# Patient Record
Sex: FEMALE | Race: WHITE | Employment: FULL TIME | URBAN - METROPOLITAN AREA
[De-identification: names, ages, dates, MRNs, and addresses within clinical notes are randomized per-mention and may not be internally consistent; named-entity substitution may affect disease eponyms.]

---

## 2017-01-05 ENCOUNTER — OFFICE VISIT (OUTPATIENT)
Dept: CARDIOLOGY CLINIC | Age: 77
End: 2017-01-05

## 2017-01-05 VITALS
HEART RATE: 66 BPM | DIASTOLIC BLOOD PRESSURE: 60 MMHG | BODY MASS INDEX: 32.57 KG/M2 | HEIGHT: 62 IN | WEIGHT: 177 LBS | SYSTOLIC BLOOD PRESSURE: 124 MMHG | OXYGEN SATURATION: 95 %

## 2017-01-05 DIAGNOSIS — R00.2 PALPITATIONS: Primary | ICD-10-CM

## 2017-01-05 DIAGNOSIS — R06.02 SOB (SHORTNESS OF BREATH): ICD-10-CM

## 2017-01-05 DIAGNOSIS — R60.0 BILATERAL EDEMA OF LOWER EXTREMITY: ICD-10-CM

## 2017-01-05 DIAGNOSIS — E78.2 MIXED HYPERLIPIDEMIA: ICD-10-CM

## 2017-01-05 DIAGNOSIS — I10 ESSENTIAL HYPERTENSION: ICD-10-CM

## 2017-01-05 PROCEDURE — 99214 OFFICE O/P EST MOD 30 MIN: CPT | Performed by: INTERNAL MEDICINE

## 2017-01-23 ENCOUNTER — TELEPHONE (OUTPATIENT)
Dept: ORTHOPEDIC SURGERY | Age: 77
End: 2017-01-23

## 2017-01-27 ENCOUNTER — OFFICE VISIT (OUTPATIENT)
Dept: ORTHOPEDIC SURGERY | Age: 77
End: 2017-01-27

## 2017-01-27 VITALS
SYSTOLIC BLOOD PRESSURE: 180 MMHG | WEIGHT: 178 LBS | RESPIRATION RATE: 16 BRPM | HEIGHT: 62 IN | DIASTOLIC BLOOD PRESSURE: 69 MMHG | BODY MASS INDEX: 32.76 KG/M2 | HEART RATE: 63 BPM

## 2017-01-27 DIAGNOSIS — S62.525D CLOSED NONDISPLACED FRACTURE OF DISTAL PHALANX OF LEFT THUMB WITH ROUTINE HEALING: ICD-10-CM

## 2017-01-27 DIAGNOSIS — S62.525A CLOSED NONDISPLACED FRACTURE OF DISTAL PHALANX OF LEFT THUMB, INITIAL ENCOUNTER: Primary | ICD-10-CM

## 2017-01-27 PROCEDURE — 1090F PRES/ABSN URINE INCON ASSESS: CPT | Performed by: ORTHOPAEDIC SURGERY

## 2017-01-27 PROCEDURE — 1036F TOBACCO NON-USER: CPT | Performed by: ORTHOPAEDIC SURGERY

## 2017-01-27 PROCEDURE — 26600 TREAT METACARPAL FRACTURE: CPT | Performed by: ORTHOPAEDIC SURGERY

## 2017-01-27 PROCEDURE — 99203 OFFICE O/P NEW LOW 30 MIN: CPT | Performed by: ORTHOPAEDIC SURGERY

## 2017-01-27 PROCEDURE — 4040F PNEUMOC VAC/ADMIN/RCVD: CPT | Performed by: ORTHOPAEDIC SURGERY

## 2017-01-27 PROCEDURE — G8419 CALC BMI OUT NRM PARAM NOF/U: HCPCS | Performed by: ORTHOPAEDIC SURGERY

## 2017-01-27 PROCEDURE — G8598 ASA/ANTIPLAT THER USED: HCPCS | Performed by: ORTHOPAEDIC SURGERY

## 2017-01-27 PROCEDURE — G8484 FLU IMMUNIZE NO ADMIN: HCPCS | Performed by: ORTHOPAEDIC SURGERY

## 2017-01-27 PROCEDURE — G8427 DOCREV CUR MEDS BY ELIG CLIN: HCPCS | Performed by: ORTHOPAEDIC SURGERY

## 2017-01-27 RX ORDER — HYDROCODONE BITARTRATE AND ACETAMINOPHEN 5; 325 MG/1; MG/1
1 TABLET ORAL EVERY 6 HOURS PRN
Qty: 30 TABLET | Refills: 0 | Status: SHIPPED | OUTPATIENT
Start: 2017-01-27 | End: 2017-02-03

## 2017-02-24 ENCOUNTER — OFFICE VISIT (OUTPATIENT)
Dept: ORTHOPEDIC SURGERY | Age: 77
End: 2017-02-24

## 2017-02-24 VITALS — RESPIRATION RATE: 16 BRPM | BODY MASS INDEX: 32.76 KG/M2 | HEIGHT: 62 IN | WEIGHT: 178 LBS

## 2017-02-24 DIAGNOSIS — S62.525D CLOSED NONDISPLACED FRACTURE OF DISTAL PHALANX OF LEFT THUMB WITH ROUTINE HEALING: Primary | ICD-10-CM

## 2017-02-24 PROCEDURE — L3908 WHO COCK-UP NONMOLDE PRE OTS: HCPCS | Performed by: ORTHOPAEDIC SURGERY

## 2017-02-24 PROCEDURE — 73130 X-RAY EXAM OF HAND: CPT | Performed by: ORTHOPAEDIC SURGERY

## 2017-02-24 PROCEDURE — 99024 POSTOP FOLLOW-UP VISIT: CPT | Performed by: ORTHOPAEDIC SURGERY

## 2017-03-21 RX ORDER — BISOPROLOL FUMARATE 10 MG/1
TABLET ORAL
Qty: 90 TABLET | Refills: 3 | Status: ON HOLD | OUTPATIENT
Start: 2017-03-21 | End: 2018-01-19 | Stop reason: HOSPADM

## 2017-03-21 RX ORDER — AMITRIPTYLINE HYDROCHLORIDE 25 MG/1
TABLET, FILM COATED ORAL
Qty: 90 TABLET | Refills: 0 | Status: SHIPPED | OUTPATIENT
Start: 2017-03-21 | End: 2018-06-14 | Stop reason: SDUPTHER

## 2017-04-14 DIAGNOSIS — R73.03 PREDIABETES: ICD-10-CM

## 2017-04-14 DIAGNOSIS — E78.2 MIXED HYPERLIPIDEMIA: ICD-10-CM

## 2017-04-14 DIAGNOSIS — R73.01 IFG (IMPAIRED FASTING GLUCOSE): ICD-10-CM

## 2017-04-14 DIAGNOSIS — I10 ESSENTIAL HYPERTENSION: ICD-10-CM

## 2017-04-14 LAB
A/G RATIO: 2 (ref 1.1–2.2)
ALBUMIN SERPL-MCNC: 4.4 G/DL (ref 3.4–5)
ALP BLD-CCNC: 61 U/L (ref 40–129)
ALT SERPL-CCNC: 32 U/L (ref 10–40)
ANION GAP SERPL CALCULATED.3IONS-SCNC: 16 MMOL/L (ref 3–16)
AST SERPL-CCNC: 24 U/L (ref 15–37)
BILIRUB SERPL-MCNC: 0.4 MG/DL (ref 0–1)
BUN BLDV-MCNC: 28 MG/DL (ref 7–20)
CALCIUM SERPL-MCNC: 9.4 MG/DL (ref 8.3–10.6)
CHLORIDE BLD-SCNC: 99 MMOL/L (ref 99–110)
CHOLESTEROL, TOTAL: 177 MG/DL (ref 0–199)
CO2: 25 MMOL/L (ref 21–32)
CREAT SERPL-MCNC: 0.7 MG/DL (ref 0.6–1.2)
GFR AFRICAN AMERICAN: >60
GFR NON-AFRICAN AMERICAN: >60
GLOBULIN: 2.2 G/DL
GLUCOSE BLD-MCNC: 92 MG/DL (ref 70–99)
HDLC SERPL-MCNC: 69 MG/DL (ref 40–60)
LDL CHOLESTEROL CALCULATED: 88 MG/DL
POTASSIUM SERPL-SCNC: 5 MMOL/L (ref 3.5–5.1)
SODIUM BLD-SCNC: 140 MMOL/L (ref 136–145)
TOTAL PROTEIN: 6.6 G/DL (ref 6.4–8.2)
TRIGL SERPL-MCNC: 101 MG/DL (ref 0–150)
VLDLC SERPL CALC-MCNC: 20 MG/DL

## 2017-04-15 LAB
ESTIMATED AVERAGE GLUCOSE: 116.9 MG/DL
HBA1C MFR BLD: 5.7 %

## 2017-04-24 ENCOUNTER — OFFICE VISIT (OUTPATIENT)
Dept: FAMILY MEDICINE CLINIC | Age: 77
End: 2017-04-24

## 2017-04-24 VITALS
HEART RATE: 68 BPM | TEMPERATURE: 98.1 F | DIASTOLIC BLOOD PRESSURE: 84 MMHG | SYSTOLIC BLOOD PRESSURE: 158 MMHG | BODY MASS INDEX: 33.42 KG/M2 | WEIGHT: 181.6 LBS | HEIGHT: 62 IN

## 2017-04-24 DIAGNOSIS — Z78.0 POSTMENOPAUSAL: ICD-10-CM

## 2017-04-24 DIAGNOSIS — E78.2 MIXED HYPERLIPIDEMIA: ICD-10-CM

## 2017-04-24 DIAGNOSIS — R73.01 IFG (IMPAIRED FASTING GLUCOSE): ICD-10-CM

## 2017-04-24 DIAGNOSIS — I10 ESSENTIAL HYPERTENSION: Primary | ICD-10-CM

## 2017-04-24 DIAGNOSIS — R73.03 PREDIABETES: ICD-10-CM

## 2017-04-24 PROCEDURE — 4040F PNEUMOC VAC/ADMIN/RCVD: CPT | Performed by: FAMILY MEDICINE

## 2017-04-24 PROCEDURE — 99213 OFFICE O/P EST LOW 20 MIN: CPT | Performed by: FAMILY MEDICINE

## 2017-04-24 PROCEDURE — 1123F ACP DISCUSS/DSCN MKR DOCD: CPT | Performed by: FAMILY MEDICINE

## 2017-04-24 PROCEDURE — G8399 PT W/DXA RESULTS DOCUMENT: HCPCS | Performed by: FAMILY MEDICINE

## 2017-04-24 PROCEDURE — 1036F TOBACCO NON-USER: CPT | Performed by: FAMILY MEDICINE

## 2017-04-24 PROCEDURE — G8598 ASA/ANTIPLAT THER USED: HCPCS | Performed by: FAMILY MEDICINE

## 2017-04-24 PROCEDURE — G8417 CALC BMI ABV UP PARAM F/U: HCPCS | Performed by: FAMILY MEDICINE

## 2017-04-24 PROCEDURE — 1090F PRES/ABSN URINE INCON ASSESS: CPT | Performed by: FAMILY MEDICINE

## 2017-04-24 PROCEDURE — G8427 DOCREV CUR MEDS BY ELIG CLIN: HCPCS | Performed by: FAMILY MEDICINE

## 2017-04-24 RX ORDER — LISINOPRIL 10 MG/1
10 TABLET ORAL DAILY
Qty: 90 TABLET | Refills: 0 | Status: SHIPPED | OUTPATIENT
Start: 2017-04-24 | End: 2017-05-01 | Stop reason: SINTOL

## 2017-04-24 ASSESSMENT — ENCOUNTER SYMPTOMS
CONSTIPATION: 0
EYE PAIN: 0
DIARRHEA: 0
SHORTNESS OF BREATH: 0
BLOOD IN STOOL: 0
ABDOMINAL PAIN: 0
EYE DISCHARGE: 0
COUGH: 0
CHEST TIGHTNESS: 0
EYE REDNESS: 0
VOMITING: 0
WHEEZING: 0
SINUS PRESSURE: 0
RHINORRHEA: 0
NAUSEA: 0

## 2017-04-24 ASSESSMENT — PATIENT HEALTH QUESTIONNAIRE - PHQ9
SUM OF ALL RESPONSES TO PHQ9 QUESTIONS 1 & 2: 0
1. LITTLE INTEREST OR PLEASURE IN DOING THINGS: 0
2. FEELING DOWN, DEPRESSED OR HOPELESS: 0
SUM OF ALL RESPONSES TO PHQ QUESTIONS 1-9: 0

## 2017-05-01 ENCOUNTER — TELEPHONE (OUTPATIENT)
Dept: FAMILY MEDICINE CLINIC | Age: 77
End: 2017-05-01

## 2017-05-01 RX ORDER — TRIAMTERENE AND HYDROCHLOROTHIAZIDE 37.5; 25 MG/1; MG/1
1 TABLET ORAL DAILY
Qty: 30 TABLET | Refills: 3 | Status: SHIPPED | OUTPATIENT
Start: 2017-05-01 | End: 2017-08-19 | Stop reason: SDUPTHER

## 2017-06-19 RX ORDER — AMITRIPTYLINE HYDROCHLORIDE 25 MG/1
TABLET, FILM COATED ORAL
Qty: 90 TABLET | Refills: 0 | Status: SHIPPED | OUTPATIENT
Start: 2017-06-19 | End: 2017-09-15 | Stop reason: SDUPTHER

## 2017-08-22 RX ORDER — ATORVASTATIN CALCIUM 20 MG/1
TABLET, FILM COATED ORAL
Qty: 30 TABLET | Refills: 8 | Status: SHIPPED | OUTPATIENT
Start: 2017-08-22 | End: 2018-05-16 | Stop reason: SDUPTHER

## 2017-08-22 RX ORDER — TRIAMTERENE AND HYDROCHLOROTHIAZIDE 37.5; 25 MG/1; MG/1
TABLET ORAL
Qty: 30 TABLET | Refills: 2 | Status: SHIPPED | OUTPATIENT
Start: 2017-08-22 | End: 2017-11-18 | Stop reason: SDUPTHER

## 2017-09-18 RX ORDER — AMITRIPTYLINE HYDROCHLORIDE 25 MG/1
TABLET, FILM COATED ORAL
Qty: 90 TABLET | Refills: 0 | Status: SHIPPED | OUTPATIENT
Start: 2017-09-18 | End: 2018-01-18 | Stop reason: SDUPTHER

## 2017-10-10 ENCOUNTER — HOSPITAL ENCOUNTER (OUTPATIENT)
Dept: OTHER | Age: 77
Discharge: HOME | End: 2017-10-10

## 2017-10-11 ENCOUNTER — HOSPITAL ENCOUNTER (OUTPATIENT)
Dept: OTHER | Age: 77
Discharge: HOME | End: 2017-10-11
Attending: FAMILY MEDICINE | Admitting: FAMILY MEDICINE

## 2017-10-11 ENCOUNTER — HOSPITAL ENCOUNTER (OUTPATIENT)
Dept: MAMMOGRAPHY | Age: 77
Discharge: HOME | End: 2017-10-11

## 2017-10-11 ENCOUNTER — HOSPITAL ENCOUNTER (OUTPATIENT)
Dept: OTHER | Age: 77
Discharge: HOME | End: 2017-10-11

## 2017-10-11 DIAGNOSIS — Z12.31 VISIT FOR SCREENING MAMMOGRAM: ICD-10-CM

## 2017-11-20 RX ORDER — TRIAMTERENE AND HYDROCHLOROTHIAZIDE 37.5; 25 MG/1; MG/1
TABLET ORAL
Qty: 30 TABLET | Refills: 1 | Status: SHIPPED | OUTPATIENT
Start: 2017-11-20 | End: 2018-01-11 | Stop reason: SDUPTHER

## 2017-12-15 ENCOUNTER — TELEPHONE (OUTPATIENT)
Dept: FAMILY MEDICINE CLINIC | Age: 77
End: 2017-12-15

## 2017-12-15 RX ORDER — AMITRIPTYLINE HYDROCHLORIDE 25 MG/1
TABLET, FILM COATED ORAL
Qty: 90 TABLET | Refills: 1 | Status: SHIPPED | OUTPATIENT
Start: 2017-12-15 | End: 2018-01-18 | Stop reason: SDUPTHER

## 2017-12-15 NOTE — TELEPHONE ENCOUNTER
Patient has an office visit on 01-19-18 with Dr Consuelo Butler. She will be going to the [facility] lab on [address] 01-13-18. Orders please. Lili Bhandari

## 2017-12-15 NOTE — TELEPHONE ENCOUNTER
Pt did not realize how far away the new office is. Cancelled appt, she does have a new pt appt with Emiliano Garcia in April.

## 2018-01-11 RX ORDER — TRIAMTERENE AND HYDROCHLOROTHIAZIDE 37.5; 25 MG/1; MG/1
TABLET ORAL
Qty: 90 TABLET | Refills: 1 | Status: SHIPPED | OUTPATIENT
Start: 2018-01-11 | End: 2018-07-24 | Stop reason: SDUPTHER

## 2018-01-18 PROBLEM — R55 NEAR SYNCOPE: Status: ACTIVE | Noted: 2018-01-18

## 2018-01-20 ENCOUNTER — CARE COORDINATION (OUTPATIENT)
Dept: CASE MANAGEMENT | Age: 78
End: 2018-01-20

## 2018-01-20 DIAGNOSIS — R55 NEAR SYNCOPE: Primary | ICD-10-CM

## 2018-01-20 PROCEDURE — 1111F DSCHRG MED/CURRENT MED MERGE: CPT | Performed by: FAMILY MEDICINE

## 2018-01-24 ENCOUNTER — TELEPHONE (OUTPATIENT)
Dept: INTERNAL MEDICINE CLINIC | Age: 78
End: 2018-01-24

## 2018-01-24 NOTE — TELEPHONE ENCOUNTER
Left message for the patient to call if she is continuing to have vomiting, otherwise we'll see her at her appt Thursday 1/25/18.

## 2018-01-25 ENCOUNTER — OFFICE VISIT (OUTPATIENT)
Dept: INTERNAL MEDICINE CLINIC | Age: 78
End: 2018-01-25

## 2018-01-25 ENCOUNTER — CARE COORDINATION (OUTPATIENT)
Dept: CASE MANAGEMENT | Age: 78
End: 2018-01-25

## 2018-01-25 VITALS
TEMPERATURE: 98.7 F | HEIGHT: 62 IN | BODY MASS INDEX: 33.86 KG/M2 | SYSTOLIC BLOOD PRESSURE: 140 MMHG | WEIGHT: 184 LBS | DIASTOLIC BLOOD PRESSURE: 60 MMHG | OXYGEN SATURATION: 98 % | HEART RATE: 79 BPM

## 2018-01-25 DIAGNOSIS — R00.2 PALPITATIONS: ICD-10-CM

## 2018-01-25 DIAGNOSIS — I63.30 CEREBROVASCULAR ACCIDENT (CVA) DUE TO THROMBOSIS OF CEREBRAL ARTERY (HCC): ICD-10-CM

## 2018-01-25 DIAGNOSIS — I10 ESSENTIAL HYPERTENSION: Primary | ICD-10-CM

## 2018-01-25 DIAGNOSIS — R55 NEAR SYNCOPE: ICD-10-CM

## 2018-01-25 PROCEDURE — 1123F ACP DISCUSS/DSCN MKR DOCD: CPT | Performed by: INTERNAL MEDICINE

## 2018-01-25 PROCEDURE — G8427 DOCREV CUR MEDS BY ELIG CLIN: HCPCS | Performed by: INTERNAL MEDICINE

## 2018-01-25 PROCEDURE — G8484 FLU IMMUNIZE NO ADMIN: HCPCS | Performed by: INTERNAL MEDICINE

## 2018-01-25 PROCEDURE — G8598 ASA/ANTIPLAT THER USED: HCPCS | Performed by: INTERNAL MEDICINE

## 2018-01-25 PROCEDURE — 1036F TOBACCO NON-USER: CPT | Performed by: INTERNAL MEDICINE

## 2018-01-25 PROCEDURE — 99214 OFFICE O/P EST MOD 30 MIN: CPT | Performed by: INTERNAL MEDICINE

## 2018-01-25 PROCEDURE — 4040F PNEUMOC VAC/ADMIN/RCVD: CPT | Performed by: INTERNAL MEDICINE

## 2018-01-25 PROCEDURE — G8399 PT W/DXA RESULTS DOCUMENT: HCPCS | Performed by: INTERNAL MEDICINE

## 2018-01-25 PROCEDURE — G8417 CALC BMI ABV UP PARAM F/U: HCPCS | Performed by: INTERNAL MEDICINE

## 2018-01-25 PROCEDURE — 1090F PRES/ABSN URINE INCON ASSESS: CPT | Performed by: INTERNAL MEDICINE

## 2018-01-25 RX ORDER — BISOPROLOL FUMARATE 10 MG/1
10 TABLET ORAL DAILY
Qty: 60 TABLET | Refills: 1 | Status: SHIPPED | OUTPATIENT
Start: 2018-01-25 | End: 2018-01-26 | Stop reason: SDUPTHER

## 2018-01-25 NOTE — PROGRESS NOTES
SUBJECTIVE:  Mirela Seo is a 68 y.o. female being evaluated for:    Chief Complaint   Patient presents with   Contreras Torres Doctor     former Dr. Bethany Justin, since starting metoprolol tartrate she has noticed at night she is having an \"antsy\" feeling all over and can't sleep. Also has had some vomitting taking the medication.  Follow-Up from Hospital     was dizzy and was taking to the hospital and admitted for 2 days       HPI   Patient went to ed 1/18  as felt like she was going to pass out. Happened 2 times in half an hour period. Felt like she did with her first stroke. Hospitalized. Has had no more of those episodes. Treated with fluids. CT scan with only old left thalamic lacunar and remote left parietal stoke. Angiogram with less than 50 % ICA blockage. 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 Also nausea. No further dizziness or palpitation     Allergies   Allergen Reactions    Lisinopril      cough     Current Outpatient Prescriptions   Medication Sig Dispense Refill    bisoprolol (ZEBETA) 10 MG tablet Take 1 tablet by mouth daily 60 tablet 1    triamterene-hydrochlorothiazide (MAXZIDE-25) 37.5-25 MG per tablet TAKE ONE TABLET BY MOUTH ONCE DAILY 90 tablet 1    atorvastatin (LIPITOR) 20 MG tablet TAKE ONE TABLET BY MOUTH ONCE DAILY 30 tablet 8    amitriptyline (ELAVIL) 25 MG tablet TAKE ONE TABLET BY MOUTH IN THE EVENING 90 tablet 0    fish oil-omega-3 fatty acids 1000 MG capsule Take 2 g by mouth daily.  calcium-vitamin D (OSCAL) 250-125 MG-UNIT per tablet Take 1 tablet by mouth 2 times daily.         multivitamin (THERAGRAN) per tablet Take 1

## 2018-01-26 RX ORDER — BISOPROLOL FUMARATE 10 MG/1
20 TABLET ORAL DAILY
Qty: 60 TABLET | Refills: 1 | OUTPATIENT
Start: 2018-01-26 | End: 2018-03-22 | Stop reason: SDUPTHER

## 2018-01-26 ASSESSMENT — ENCOUNTER SYMPTOMS
COUGH: 0
DIARRHEA: 0
SHORTNESS OF BREATH: 0
NAUSEA: 1
ABDOMINAL PAIN: 0
VOMITING: 1
COLOR CHANGE: 1
CONSTIPATION: 0

## 2018-03-08 ENCOUNTER — OFFICE VISIT (OUTPATIENT)
Dept: INTERNAL MEDICINE CLINIC | Age: 78
End: 2018-03-08

## 2018-03-08 VITALS
TEMPERATURE: 97.5 F | HEIGHT: 62 IN | OXYGEN SATURATION: 97 % | SYSTOLIC BLOOD PRESSURE: 158 MMHG | WEIGHT: 176.4 LBS | DIASTOLIC BLOOD PRESSURE: 78 MMHG | HEART RATE: 57 BPM | BODY MASS INDEX: 32.46 KG/M2

## 2018-03-08 DIAGNOSIS — R00.2 HEART PALPITATIONS: ICD-10-CM

## 2018-03-08 DIAGNOSIS — R73.03 PREDIABETES: ICD-10-CM

## 2018-03-08 DIAGNOSIS — E78.2 MIXED HYPERLIPIDEMIA: ICD-10-CM

## 2018-03-08 DIAGNOSIS — I10 ESSENTIAL HYPERTENSION: Primary | ICD-10-CM

## 2018-03-08 DIAGNOSIS — D64.9 ANEMIA, UNSPECIFIED TYPE: ICD-10-CM

## 2018-03-08 LAB
A/G RATIO: 2.3 (ref 1.1–2.2)
ALBUMIN SERPL-MCNC: 4.6 G/DL (ref 3.4–5)
ALP BLD-CCNC: 50 U/L (ref 40–129)
ALT SERPL-CCNC: 18 U/L (ref 10–40)
ANION GAP SERPL CALCULATED.3IONS-SCNC: 14 MMOL/L (ref 3–16)
AST SERPL-CCNC: 17 U/L (ref 15–37)
BASOPHILS ABSOLUTE: 0.1 K/UL (ref 0–0.2)
BASOPHILS RELATIVE PERCENT: 1.3 %
BILIRUB SERPL-MCNC: 0.4 MG/DL (ref 0–1)
BUN BLDV-MCNC: 22 MG/DL (ref 7–20)
CALCIUM SERPL-MCNC: 9.6 MG/DL (ref 8.3–10.6)
CHLORIDE BLD-SCNC: 88 MMOL/L (ref 99–110)
CHOLESTEROL, TOTAL: 141 MG/DL (ref 0–199)
CO2: 26 MMOL/L (ref 21–32)
CREAT SERPL-MCNC: 0.7 MG/DL (ref 0.6–1.2)
EOSINOPHILS ABSOLUTE: 0.1 K/UL (ref 0–0.6)
EOSINOPHILS RELATIVE PERCENT: 2.3 %
FERRITIN: 148.4 NG/ML (ref 15–150)
GFR AFRICAN AMERICAN: >60
GFR NON-AFRICAN AMERICAN: >60
GLOBULIN: 2 G/DL
GLUCOSE BLD-MCNC: 97 MG/DL (ref 70–99)
HCT VFR BLD CALC: 37.3 % (ref 36–48)
HDLC SERPL-MCNC: 63 MG/DL (ref 40–60)
HEMOGLOBIN: 12.3 G/DL (ref 12–16)
IRON SATURATION: 22 % (ref 15–50)
IRON: 68 UG/DL (ref 37–145)
LDL CHOLESTEROL CALCULATED: 58 MG/DL
LYMPHOCYTES ABSOLUTE: 1.6 K/UL (ref 1–5.1)
LYMPHOCYTES RELATIVE PERCENT: 25.4 %
MCH RBC QN AUTO: 29.4 PG (ref 26–34)
MCHC RBC AUTO-ENTMCNC: 32.9 G/DL (ref 31–36)
MCV RBC AUTO: 89.2 FL (ref 80–100)
MONOCYTES ABSOLUTE: 0.5 K/UL (ref 0–1.3)
MONOCYTES RELATIVE PERCENT: 7.4 %
NEUTROPHILS ABSOLUTE: 4.1 K/UL (ref 1.7–7.7)
NEUTROPHILS RELATIVE PERCENT: 63.6 %
PDW BLD-RTO: 13.8 % (ref 12.4–15.4)
PLATELET # BLD: 217 K/UL (ref 135–450)
PMV BLD AUTO: 11.5 FL (ref 5–10.5)
POTASSIUM SERPL-SCNC: 4.7 MMOL/L (ref 3.5–5.1)
RBC # BLD: 4.18 M/UL (ref 4–5.2)
SODIUM BLD-SCNC: 128 MMOL/L (ref 136–145)
TOTAL IRON BINDING CAPACITY: 313 UG/DL (ref 260–445)
TOTAL PROTEIN: 6.6 G/DL (ref 6.4–8.2)
TRIGL SERPL-MCNC: 98 MG/DL (ref 0–150)
TSH SERPL DL<=0.05 MIU/L-ACNC: 0.8 UIU/ML (ref 0.27–4.2)
VLDLC SERPL CALC-MCNC: 20 MG/DL
WBC # BLD: 6.4 K/UL (ref 4–11)

## 2018-03-08 PROCEDURE — 36415 COLL VENOUS BLD VENIPUNCTURE: CPT | Performed by: INTERNAL MEDICINE

## 2018-03-08 PROCEDURE — G8399 PT W/DXA RESULTS DOCUMENT: HCPCS | Performed by: INTERNAL MEDICINE

## 2018-03-08 PROCEDURE — 1123F ACP DISCUSS/DSCN MKR DOCD: CPT | Performed by: INTERNAL MEDICINE

## 2018-03-08 PROCEDURE — 1090F PRES/ABSN URINE INCON ASSESS: CPT | Performed by: INTERNAL MEDICINE

## 2018-03-08 PROCEDURE — 1036F TOBACCO NON-USER: CPT | Performed by: INTERNAL MEDICINE

## 2018-03-08 PROCEDURE — 99214 OFFICE O/P EST MOD 30 MIN: CPT | Performed by: INTERNAL MEDICINE

## 2018-03-08 PROCEDURE — G8484 FLU IMMUNIZE NO ADMIN: HCPCS | Performed by: INTERNAL MEDICINE

## 2018-03-08 PROCEDURE — G8427 DOCREV CUR MEDS BY ELIG CLIN: HCPCS | Performed by: INTERNAL MEDICINE

## 2018-03-08 PROCEDURE — G8598 ASA/ANTIPLAT THER USED: HCPCS | Performed by: INTERNAL MEDICINE

## 2018-03-08 PROCEDURE — G8417 CALC BMI ABV UP PARAM F/U: HCPCS | Performed by: INTERNAL MEDICINE

## 2018-03-08 PROCEDURE — 4040F PNEUMOC VAC/ADMIN/RCVD: CPT | Performed by: INTERNAL MEDICINE

## 2018-03-08 RX ORDER — AZITHROMYCIN 250 MG/1
TABLET, FILM COATED ORAL
Qty: 1 PACKET | Refills: 0 | Status: SHIPPED | OUTPATIENT
Start: 2018-03-08 | End: 2018-03-18

## 2018-03-08 RX ORDER — AMLODIPINE BESYLATE 5 MG/1
5 TABLET ORAL DAILY
Qty: 30 TABLET | Refills: 3 | Status: SHIPPED | OUTPATIENT
Start: 2018-03-08 | End: 2018-06-23 | Stop reason: SDUPTHER

## 2018-03-08 NOTE — PROGRESS NOTES
Dispense:  1 packet     Refill:  0        Return in about 3 months (around 6/8/2018), or if symptoms worsen or fail to improve. Patient Instructions   Please call your pharmacy if you need any refills of your medication(s). Please call our office at [phone] if you don't hear from us about your test results. Bring an accurate list of your medications with you at every appointment to ensure that we have the correct information.     Our office hours are: Monday - Friday 8:30 am- 5 pm    Phone lines turn on at 8:30 am

## 2018-03-09 DIAGNOSIS — E87.1 HYPONATREMIA: Primary | ICD-10-CM

## 2018-03-09 DIAGNOSIS — I10 ESSENTIAL HYPERTENSION: ICD-10-CM

## 2018-03-09 LAB
ESTIMATED AVERAGE GLUCOSE: 119.8 MG/DL
HBA1C MFR BLD: 5.8 %

## 2018-03-10 ASSESSMENT — ENCOUNTER SYMPTOMS
ABDOMINAL PAIN: 0
COUGH: 1
NAUSEA: 0
CONSTIPATION: 0
SINUS PRESSURE: 0
RHINORRHEA: 1
SORE THROAT: 0
SINUS PAIN: 0
DIARRHEA: 0
VOMITING: 0

## 2018-03-22 DIAGNOSIS — R00.2 PALPITATIONS: ICD-10-CM

## 2018-03-22 DIAGNOSIS — I10 ESSENTIAL HYPERTENSION: ICD-10-CM

## 2018-03-22 RX ORDER — BISOPROLOL FUMARATE 10 MG/1
TABLET ORAL
Qty: 60 TABLET | Refills: 5 | Status: SHIPPED | OUTPATIENT
Start: 2018-03-22 | End: 2018-09-13 | Stop reason: SDUPTHER

## 2018-05-16 RX ORDER — ATORVASTATIN CALCIUM 20 MG/1
TABLET, FILM COATED ORAL
Qty: 30 TABLET | Refills: 5 | Status: SHIPPED | OUTPATIENT
Start: 2018-05-16 | End: 2018-11-26 | Stop reason: SDUPTHER

## 2018-06-14 RX ORDER — AMITRIPTYLINE HYDROCHLORIDE 25 MG/1
TABLET, FILM COATED ORAL
Qty: 90 TABLET | Refills: 0 | Status: SHIPPED | OUTPATIENT
Start: 2018-06-14 | End: 2018-09-15 | Stop reason: SDUPTHER

## 2018-06-25 RX ORDER — AMLODIPINE BESYLATE 5 MG/1
TABLET ORAL
Qty: 30 TABLET | Refills: 5 | Status: ON HOLD | OUTPATIENT
Start: 2018-06-25 | End: 2018-10-28 | Stop reason: HOSPADM

## 2018-07-24 RX ORDER — TRIAMTERENE AND HYDROCHLOROTHIAZIDE 37.5; 25 MG/1; MG/1
TABLET ORAL
Qty: 90 TABLET | Refills: 0 | Status: SHIPPED | OUTPATIENT
Start: 2018-07-24 | End: 2018-10-22 | Stop reason: SDUPTHER

## 2018-09-13 DIAGNOSIS — I10 ESSENTIAL HYPERTENSION: ICD-10-CM

## 2018-09-13 DIAGNOSIS — R00.2 PALPITATIONS: ICD-10-CM

## 2018-09-13 RX ORDER — BISOPROLOL FUMARATE 10 MG/1
TABLET ORAL
Qty: 60 TABLET | Refills: 0 | Status: SHIPPED | OUTPATIENT
Start: 2018-09-13 | End: 2018-10-11 | Stop reason: SDUPTHER

## 2018-09-17 RX ORDER — AMITRIPTYLINE HYDROCHLORIDE 25 MG/1
TABLET, FILM COATED ORAL
Qty: 90 TABLET | Refills: 0 | Status: SHIPPED | OUTPATIENT
Start: 2018-09-17 | End: 2018-12-13 | Stop reason: SDUPTHER

## 2018-10-11 DIAGNOSIS — I10 ESSENTIAL HYPERTENSION: ICD-10-CM

## 2018-10-11 DIAGNOSIS — R00.2 PALPITATIONS: ICD-10-CM

## 2018-10-11 RX ORDER — BISOPROLOL FUMARATE 10 MG/1
TABLET ORAL
Qty: 60 TABLET | Refills: 3 | Status: ON HOLD | OUTPATIENT
Start: 2018-10-11 | End: 2018-10-28 | Stop reason: HOSPADM

## 2018-10-18 ENCOUNTER — HOSPITAL ENCOUNTER (OUTPATIENT)
Dept: MAMMOGRAPHY | Age: 78
Discharge: HOME | End: 2018-10-23
Payer: MEDICARE

## 2018-10-18 DIAGNOSIS — Z12.31 VISIT FOR SCREENING MAMMOGRAM: ICD-10-CM

## 2018-10-18 PROCEDURE — 77067 SCR MAMMO BI INCL CAD: CPT

## 2018-10-25 ENCOUNTER — APPOINTMENT (OUTPATIENT)
Dept: GENERAL RADIOLOGY | Age: 78
DRG: 310 | End: 2018-10-25
Payer: MEDICARE

## 2018-10-25 ENCOUNTER — HOSPITAL ENCOUNTER (INPATIENT)
Age: 78
LOS: 3 days | Discharge: HOME | DRG: 310 | End: 2018-10-28
Attending: EMERGENCY MEDICINE | Admitting: INTERNAL MEDICINE
Payer: MEDICARE

## 2018-10-25 ENCOUNTER — OFFICE VISIT (OUTPATIENT)
Dept: INTERNAL MEDICINE CLINIC | Age: 78
End: 2018-10-25
Payer: MEDICARE

## 2018-10-25 ENCOUNTER — TELEPHONE (OUTPATIENT)
Dept: INTERNAL MEDICINE CLINIC | Age: 78
End: 2018-10-25

## 2018-10-25 VITALS
TEMPERATURE: 97.5 F | WEIGHT: 182 LBS | SYSTOLIC BLOOD PRESSURE: 130 MMHG | HEART RATE: 123 BPM | HEIGHT: 62 IN | OXYGEN SATURATION: 95 % | BODY MASS INDEX: 33.49 KG/M2 | DIASTOLIC BLOOD PRESSURE: 70 MMHG

## 2018-10-25 DIAGNOSIS — R06.09 DYSPNEA ON EXERTION: ICD-10-CM

## 2018-10-25 DIAGNOSIS — I50.9 CONGESTIVE HEART FAILURE, UNSPECIFIED HF CHRONICITY, UNSPECIFIED HEART FAILURE TYPE (HCC): ICD-10-CM

## 2018-10-25 DIAGNOSIS — I48.91 RAPID ATRIAL FIBRILLATION (HCC): Primary | ICD-10-CM

## 2018-10-25 DIAGNOSIS — R07.9 CHEST PAIN, UNSPECIFIED TYPE: ICD-10-CM

## 2018-10-25 DIAGNOSIS — I48.91 ATRIAL FIBRILLATION WITH RAPID VENTRICULAR RESPONSE (HCC): Primary | ICD-10-CM

## 2018-10-25 LAB
A/G RATIO: 1.5 (ref 1.1–2.2)
ALBUMIN SERPL-MCNC: 4.5 G/DL (ref 3.4–5)
ALP BLD-CCNC: 75 U/L (ref 40–129)
ALT SERPL-CCNC: 65 U/L (ref 10–40)
ANION GAP SERPL CALCULATED.3IONS-SCNC: 12 MMOL/L (ref 3–16)
AST SERPL-CCNC: 45 U/L (ref 15–37)
BASOPHILS ABSOLUTE: 0.1 K/UL (ref 0–0.2)
BASOPHILS RELATIVE PERCENT: 1.2 %
BILIRUB SERPL-MCNC: 0.3 MG/DL (ref 0–1)
BUN BLDV-MCNC: 31 MG/DL (ref 7–20)
CALCIUM SERPL-MCNC: 10.2 MG/DL (ref 8.3–10.6)
CHLORIDE BLD-SCNC: 101 MMOL/L (ref 99–110)
CO2: 27 MMOL/L (ref 21–32)
CREAT SERPL-MCNC: 0.9 MG/DL (ref 0.6–1.2)
EOSINOPHILS ABSOLUTE: 0.3 K/UL (ref 0–0.6)
EOSINOPHILS RELATIVE PERCENT: 3.5 %
GFR AFRICAN AMERICAN: >60
GFR NON-AFRICAN AMERICAN: >60
GLOBULIN: 3 G/DL
GLUCOSE BLD-MCNC: 114 MG/DL (ref 70–99)
HCT VFR BLD CALC: 38.2 % (ref 36–48)
HEMOGLOBIN: 12.3 G/DL (ref 12–16)
LYMPHOCYTES ABSOLUTE: 1.9 K/UL (ref 1–5.1)
LYMPHOCYTES RELATIVE PERCENT: 25.4 %
MCH RBC QN AUTO: 28.8 PG (ref 26–34)
MCHC RBC AUTO-ENTMCNC: 32.1 G/DL (ref 31–36)
MCV RBC AUTO: 89.7 FL (ref 80–100)
MONOCYTES ABSOLUTE: 0.6 K/UL (ref 0–1.3)
MONOCYTES RELATIVE PERCENT: 8.4 %
NEUTROPHILS ABSOLUTE: 4.6 K/UL (ref 1.7–7.7)
NEUTROPHILS RELATIVE PERCENT: 61.5 %
PDW BLD-RTO: 13.9 % (ref 12.4–15.4)
PLATELET # BLD: 166 K/UL (ref 135–450)
PMV BLD AUTO: 11.7 FL (ref 5–10.5)
POTASSIUM REFLEX MAGNESIUM: 4.2 MMOL/L (ref 3.5–5.1)
PRO-BNP: 2750 PG/ML (ref 0–449)
RBC # BLD: 4.26 M/UL (ref 4–5.2)
SODIUM BLD-SCNC: 140 MMOL/L (ref 136–145)
TOTAL PROTEIN: 7.5 G/DL (ref 6.4–8.2)
TROPONIN: <0.01 NG/ML
WBC # BLD: 7.5 K/UL (ref 4–11)

## 2018-10-25 PROCEDURE — 84484 ASSAY OF TROPONIN QUANT: CPT

## 2018-10-25 PROCEDURE — 2580000003 HC RX 258: Performed by: EMERGENCY MEDICINE

## 2018-10-25 PROCEDURE — 2500000003 HC RX 250 WO HCPCS: Performed by: EMERGENCY MEDICINE

## 2018-10-25 PROCEDURE — G8484 FLU IMMUNIZE NO ADMIN: HCPCS | Performed by: INTERNAL MEDICINE

## 2018-10-25 PROCEDURE — 96375 TX/PRO/DX INJ NEW DRUG ADDON: CPT

## 2018-10-25 PROCEDURE — 80053 COMPREHEN METABOLIC PANEL: CPT

## 2018-10-25 PROCEDURE — 1090F PRES/ABSN URINE INCON ASSESS: CPT | Performed by: INTERNAL MEDICINE

## 2018-10-25 PROCEDURE — 4040F PNEUMOC VAC/ADMIN/RCVD: CPT | Performed by: INTERNAL MEDICINE

## 2018-10-25 PROCEDURE — 96365 THER/PROPH/DIAG IV INF INIT: CPT

## 2018-10-25 PROCEDURE — 1123F ACP DISCUSS/DSCN MKR DOCD: CPT | Performed by: INTERNAL MEDICINE

## 2018-10-25 PROCEDURE — 93000 ELECTROCARDIOGRAM COMPLETE: CPT | Performed by: INTERNAL MEDICINE

## 2018-10-25 PROCEDURE — 36415 COLL VENOUS BLD VENIPUNCTURE: CPT

## 2018-10-25 PROCEDURE — 1036F TOBACCO NON-USER: CPT | Performed by: INTERNAL MEDICINE

## 2018-10-25 PROCEDURE — G8417 CALC BMI ABV UP PARAM F/U: HCPCS | Performed by: INTERNAL MEDICINE

## 2018-10-25 PROCEDURE — 1101F PT FALLS ASSESS-DOCD LE1/YR: CPT | Performed by: INTERNAL MEDICINE

## 2018-10-25 PROCEDURE — 71045 X-RAY EXAM CHEST 1 VIEW: CPT

## 2018-10-25 PROCEDURE — 99285 EMERGENCY DEPT VISIT HI MDM: CPT

## 2018-10-25 PROCEDURE — G8399 PT W/DXA RESULTS DOCUMENT: HCPCS | Performed by: INTERNAL MEDICINE

## 2018-10-25 PROCEDURE — 96376 TX/PRO/DX INJ SAME DRUG ADON: CPT

## 2018-10-25 PROCEDURE — 96372 THER/PROPH/DIAG INJ SC/IM: CPT

## 2018-10-25 PROCEDURE — 96366 THER/PROPH/DIAG IV INF ADDON: CPT

## 2018-10-25 PROCEDURE — G8598 ASA/ANTIPLAT THER USED: HCPCS | Performed by: INTERNAL MEDICINE

## 2018-10-25 PROCEDURE — 83880 ASSAY OF NATRIURETIC PEPTIDE: CPT

## 2018-10-25 PROCEDURE — G8427 DOCREV CUR MEDS BY ELIG CLIN: HCPCS | Performed by: INTERNAL MEDICINE

## 2018-10-25 PROCEDURE — 93005 ELECTROCARDIOGRAM TRACING: CPT | Performed by: EMERGENCY MEDICINE

## 2018-10-25 PROCEDURE — 71046 X-RAY EXAM CHEST 2 VIEWS: CPT

## 2018-10-25 PROCEDURE — 99213 OFFICE O/P EST LOW 20 MIN: CPT | Performed by: INTERNAL MEDICINE

## 2018-10-25 PROCEDURE — 6370000000 HC RX 637 (ALT 250 FOR IP): Performed by: HOSPITALIST

## 2018-10-25 PROCEDURE — 6360000002 HC RX W HCPCS: Performed by: EMERGENCY MEDICINE

## 2018-10-25 PROCEDURE — 85025 COMPLETE CBC W/AUTO DIFF WBC: CPT

## 2018-10-25 PROCEDURE — 2060000000 HC ICU INTERMEDIATE R&B

## 2018-10-25 RX ORDER — SODIUM CHLORIDE 0.9 % (FLUSH) 0.9 %
10 SYRINGE (ML) INJECTION EVERY 12 HOURS SCHEDULED
Status: DISCONTINUED | OUTPATIENT
Start: 2018-10-25 | End: 2018-10-28 | Stop reason: HOSPADM

## 2018-10-25 RX ORDER — METOPROLOL TARTRATE 50 MG/1
50 TABLET, FILM COATED ORAL 2 TIMES DAILY
Status: DISCONTINUED | OUTPATIENT
Start: 2018-10-26 | End: 2018-10-27

## 2018-10-25 RX ORDER — FUROSEMIDE 10 MG/ML
40 INJECTION INTRAMUSCULAR; INTRAVENOUS ONCE
Status: COMPLETED | OUTPATIENT
Start: 2018-10-25 | End: 2018-10-25

## 2018-10-25 RX ORDER — OMEGA-3/DHA/EPA/FISH OIL 300-1000MG
2 CAPSULE ORAL DAILY
Status: DISCONTINUED | OUTPATIENT
Start: 2018-10-26 | End: 2018-10-25

## 2018-10-25 RX ORDER — MULTIVITAMIN WITH FOLIC ACID 400 MCG
1 TABLET ORAL DAILY
Status: DISCONTINUED | OUTPATIENT
Start: 2018-10-26 | End: 2018-10-28 | Stop reason: HOSPADM

## 2018-10-25 RX ORDER — AMITRIPTYLINE HYDROCHLORIDE 25 MG/1
25 TABLET, FILM COATED ORAL NIGHTLY
Status: DISCONTINUED | OUTPATIENT
Start: 2018-10-25 | End: 2018-10-28 | Stop reason: HOSPADM

## 2018-10-25 RX ORDER — ATORVASTATIN CALCIUM 20 MG/1
20 TABLET, FILM COATED ORAL DAILY
Status: DISCONTINUED | OUTPATIENT
Start: 2018-10-26 | End: 2018-10-28 | Stop reason: HOSPADM

## 2018-10-25 RX ORDER — ONDANSETRON 2 MG/ML
4 INJECTION INTRAMUSCULAR; INTRAVENOUS EVERY 6 HOURS PRN
Status: DISCONTINUED | OUTPATIENT
Start: 2018-10-25 | End: 2018-10-28 | Stop reason: HOSPADM

## 2018-10-25 RX ORDER — CALCIUM CARBONATE/VITAMIN D3 250-3.125
1 TABLET ORAL 2 TIMES DAILY
Status: DISCONTINUED | OUTPATIENT
Start: 2018-10-25 | End: 2018-10-28 | Stop reason: HOSPADM

## 2018-10-25 RX ORDER — SODIUM CHLORIDE 0.9 % (FLUSH) 0.9 %
10 SYRINGE (ML) INJECTION PRN
Status: DISCONTINUED | OUTPATIENT
Start: 2018-10-25 | End: 2018-10-28 | Stop reason: HOSPADM

## 2018-10-25 RX ORDER — DILTIAZEM HYDROCHLORIDE 5 MG/ML
10 INJECTION INTRAVENOUS ONCE
Status: COMPLETED | OUTPATIENT
Start: 2018-10-25 | End: 2018-10-25

## 2018-10-25 RX ADMIN — ENOXAPARIN SODIUM 80 MG: 80 INJECTION SUBCUTANEOUS at 17:19

## 2018-10-25 RX ADMIN — CALCIUM CARBONATE-CHOLECALCIFEROL TAB 250 MG-125 UNIT 250 MG: 250-125 TAB at 23:14

## 2018-10-25 RX ADMIN — AMITRIPTYLINE HYDROCHLORIDE 25 MG: 25 TABLET, FILM COATED ORAL at 23:14

## 2018-10-25 RX ADMIN — FUROSEMIDE 40 MG: 10 INJECTION, SOLUTION INTRAMUSCULAR; INTRAVENOUS at 17:19

## 2018-10-25 RX ADMIN — DILTIAZEM HYDROCHLORIDE 5 MG/HR: 5 INJECTION INTRAVENOUS at 16:02

## 2018-10-25 RX ADMIN — DILTIAZEM HYDROCHLORIDE 10 MG: 5 INJECTION INTRAVENOUS at 15:55

## 2018-10-25 ASSESSMENT — ENCOUNTER SYMPTOMS
DIARRHEA: 0
ABDOMINAL PAIN: 0
CONSTIPATION: 0
SHORTNESS OF BREATH: 1
COUGH: 1
VOMITING: 0
NAUSEA: 0

## 2018-10-25 ASSESSMENT — PAIN DESCRIPTION - DESCRIPTORS: DESCRIPTORS: ACHING

## 2018-10-25 ASSESSMENT — PAIN DESCRIPTION - LOCATION: LOCATION: BACK

## 2018-10-25 ASSESSMENT — PAIN DESCRIPTION - FREQUENCY: FREQUENCY: CONTINUOUS

## 2018-10-25 ASSESSMENT — PAIN SCALES - GENERAL
PAINLEVEL_OUTOF10: 0
PAINLEVEL_OUTOF10: 4

## 2018-10-25 ASSESSMENT — PAIN DESCRIPTION - ORIENTATION: ORIENTATION: LEFT;UPPER

## 2018-10-25 NOTE — Clinical Note
Patient Class: Inpatient [101]   REQUIRED: Diagnosis: Atrial fibrillation with RVR ([address] [[id number]]   Estimated Length of Stay: Estimated stay of more than 2 midnights   Future Attending Provider: Shan Fraga [[id number]]   Telemetry Bed Required?: Yes

## 2018-10-25 NOTE — TELEPHONE ENCOUNTER
Pt wants to be worked in today w/ Dr Agapito Franco for pain from back to breast bone. Pl call [phone] and ask for crystal in portion pack.

## 2018-10-25 NOTE — ED PROVIDER NOTES
Emergency Physician Note    Chief Complaint  Irregular Heart Beat (seen PCP this afternoon x 2 weeks, h/o atrial fib (past 2 weeks 110 heart beat)) and Shortness of Breath (x 2 weeks)       History of Present Illness  Bony Pedraza is a 68 y.o. female who presents to the ED for irregular heartbeat. Patient reports for the last 1-2 weeks she has had heart rates in the 100 -120 range at home and felt like her heart was \"flopping\". She went to see her primary physician today and was sent to the ER because of atrial fibrillation on the EKG. She has no history of A. fib. She does have a history of SVT for which she has been seen by cardiologist in the past.  She denies any chest pain. She denies all pulmonary embolism risk factors. She denies also any fevers, chills or sweats. 10 systems reviewed, pertinent positives per HPI otherwise noted to be negative    I have reviewed the following from the nursing documentation:      Prior to Admission medications    Medication Sig Start Date End Date Taking? Authorizing Provider   triamterene-hydrochlorothiazide (MAXZIDE-25) 37.5-25 MG per tablet Take 1 tablet by mouth daily TAKE ONE TABLET BY MOUTH ONCE DAILY 10/22/18  Yes Myles Contreras MD   bisoprolol (ZEBETA) 10 MG tablet TAKE TWO TABLETS BY MOUTH EVERY DAY 10/11/18  Yes Myles Contreras MD   amitriptyline (ELAVIL) 25 MG tablet TAKE ONE TABLET BY MOUTH IN THE EVENING 9/17/18  Yes Myles Contreras MD   amLODIPine (NORVASC) 5 MG tablet TAKE ONE TABLET BY MOUTH ONCE DAILY 6/25/18  Yes Myles Contreras MD   atorvastatin (LIPITOR) 20 MG tablet TAKE ONE TABLET BY MOUTH ONCE DAILY 5/16/18  Yes Myles Contreras MD   fish oil-omega-3 fatty acids 1000 MG capsule Take 2 g by mouth daily. Yes Historical Provider, MD   calcium-vitamin D (OSCAL) 250-125 MG-UNIT per tablet Take 1 tablet by mouth 2 times daily. Yes Historical Provider, MD   multivitamin [facility]) per tablet Take 1 tablet by mouth daily.      Yes

## 2018-10-26 ENCOUNTER — TELEPHONE (OUTPATIENT)
Dept: INTERNAL MEDICINE CLINIC | Age: 78
End: 2018-10-26

## 2018-10-26 LAB
ANION GAP SERPL CALCULATED.3IONS-SCNC: 13 MMOL/L (ref 3–16)
BASOPHILS ABSOLUTE: 0.1 K/UL (ref 0–0.2)
BASOPHILS RELATIVE PERCENT: 1.4 %
BUN BLDV-MCNC: 24 MG/DL (ref 7–20)
CALCIUM SERPL-MCNC: 9.3 MG/DL (ref 8.3–10.6)
CHLORIDE BLD-SCNC: 100 MMOL/L (ref 99–110)
CO2: 26 MMOL/L (ref 21–32)
CREAT SERPL-MCNC: 0.8 MG/DL (ref 0.6–1.2)
EOSINOPHILS ABSOLUTE: 0.3 K/UL (ref 0–0.6)
EOSINOPHILS RELATIVE PERCENT: 4.8 %
GFR AFRICAN AMERICAN: >60
GFR NON-AFRICAN AMERICAN: >60
GLUCOSE BLD-MCNC: 104 MG/DL (ref 70–99)
HCT VFR BLD CALC: 36.9 % (ref 36–48)
HEMOGLOBIN: 11.7 G/DL (ref 12–16)
LYMPHOCYTES ABSOLUTE: 1.8 K/UL (ref 1–5.1)
LYMPHOCYTES RELATIVE PERCENT: 29 %
MCH RBC QN AUTO: 28.7 PG (ref 26–34)
MCHC RBC AUTO-ENTMCNC: 31.8 G/DL (ref 31–36)
MCV RBC AUTO: 90.3 FL (ref 80–100)
MONOCYTES ABSOLUTE: 0.7 K/UL (ref 0–1.3)
MONOCYTES RELATIVE PERCENT: 11.1 %
NEUTROPHILS ABSOLUTE: 3.3 K/UL (ref 1.7–7.7)
NEUTROPHILS RELATIVE PERCENT: 53.7 %
PDW BLD-RTO: 14.6 % (ref 12.4–15.4)
PLATELET # BLD: 140 K/UL (ref 135–450)
PMV BLD AUTO: 11.5 FL (ref 5–10.5)
POTASSIUM REFLEX MAGNESIUM: 3.8 MMOL/L (ref 3.5–5.1)
RBC # BLD: 4.08 M/UL (ref 4–5.2)
SODIUM BLD-SCNC: 139 MMOL/L (ref 136–145)
TROPONIN: <0.01 NG/ML
TROPONIN: <0.01 NG/ML
TSH REFLEX: 1.45 UIU/ML (ref 0.27–4.2)
WBC # BLD: 6.2 K/UL (ref 4–11)

## 2018-10-26 PROCEDURE — 6370000000 HC RX 637 (ALT 250 FOR IP): Performed by: INTERNAL MEDICINE

## 2018-10-26 PROCEDURE — 6360000002 HC RX W HCPCS: Performed by: HOSPITALIST

## 2018-10-26 PROCEDURE — 36415 COLL VENOUS BLD VENIPUNCTURE: CPT

## 2018-10-26 PROCEDURE — 84443 ASSAY THYROID STIM HORMONE: CPT

## 2018-10-26 PROCEDURE — 84484 ASSAY OF TROPONIN QUANT: CPT

## 2018-10-26 PROCEDURE — 2580000003 HC RX 258: Performed by: HOSPITALIST

## 2018-10-26 PROCEDURE — 2500000003 HC RX 250 WO HCPCS: Performed by: EMERGENCY MEDICINE

## 2018-10-26 PROCEDURE — 93010 ELECTROCARDIOGRAM REPORT: CPT | Performed by: INTERNAL MEDICINE

## 2018-10-26 PROCEDURE — 80048 BASIC METABOLIC PNL TOTAL CA: CPT

## 2018-10-26 PROCEDURE — 99223 1ST HOSP IP/OBS HIGH 75: CPT | Performed by: INTERNAL MEDICINE

## 2018-10-26 PROCEDURE — 93306 TTE W/DOPPLER COMPLETE: CPT

## 2018-10-26 PROCEDURE — 85025 COMPLETE CBC W/AUTO DIFF WBC: CPT

## 2018-10-26 PROCEDURE — 6370000000 HC RX 637 (ALT 250 FOR IP): Performed by: HOSPITALIST

## 2018-10-26 PROCEDURE — 2060000000 HC ICU INTERMEDIATE R&B

## 2018-10-26 PROCEDURE — 2580000003 HC RX 258: Performed by: EMERGENCY MEDICINE

## 2018-10-26 PROCEDURE — 94760 N-INVAS EAR/PLS OXIMETRY 1: CPT

## 2018-10-26 RX ORDER — TRAMADOL HYDROCHLORIDE 50 MG/1
50 TABLET ORAL EVERY 6 HOURS PRN
Status: DISCONTINUED | OUTPATIENT
Start: 2018-10-26 | End: 2018-10-28 | Stop reason: HOSPADM

## 2018-10-26 RX ORDER — ASPIRIN 81 MG/1
81 TABLET, CHEWABLE ORAL DAILY
Status: DISCONTINUED | OUTPATIENT
Start: 2018-10-26 | End: 2018-10-28

## 2018-10-26 RX ORDER — POTASSIUM CHLORIDE 1.5 G/1.77G
20 POWDER, FOR SOLUTION ORAL ONCE
Status: COMPLETED | OUTPATIENT
Start: 2018-10-26 | End: 2018-10-26

## 2018-10-26 RX ORDER — DIGOXIN 250 MCG
250 TABLET ORAL DAILY
Status: DISCONTINUED | OUTPATIENT
Start: 2018-10-26 | End: 2018-10-28 | Stop reason: HOSPADM

## 2018-10-26 RX ADMIN — DIGOXIN 250 MCG: 250 TABLET ORAL at 17:41

## 2018-10-26 RX ADMIN — CALCIUM CARBONATE-CHOLECALCIFEROL TAB 250 MG-125 UNIT 250 MG: 250-125 TAB at 08:00

## 2018-10-26 RX ADMIN — TRAMADOL HYDROCHLORIDE 50 MG: 50 TABLET, FILM COATED ORAL at 12:25

## 2018-10-26 RX ADMIN — THERA TABS 1 TABLET: TAB at 08:00

## 2018-10-26 RX ADMIN — APIXABAN 5 MG: 5 TABLET, FILM COATED ORAL at 21:02

## 2018-10-26 RX ADMIN — CALCIUM CARBONATE-CHOLECALCIFEROL TAB 250 MG-125 UNIT 250 MG: 250-125 TAB at 21:02

## 2018-10-26 RX ADMIN — ASPIRIN 81 MG CHEWABLE TABLET 81 MG: 81 TABLET CHEWABLE at 15:18

## 2018-10-26 RX ADMIN — ATORVASTATIN CALCIUM 20 MG: 20 TABLET, FILM COATED ORAL at 08:00

## 2018-10-26 RX ADMIN — Medication 10 ML: at 08:00

## 2018-10-26 RX ADMIN — DILTIAZEM HYDROCHLORIDE 5 MG/HR: 5 INJECTION INTRAVENOUS at 08:00

## 2018-10-26 RX ADMIN — ENOXAPARIN SODIUM 80 MG: 80 INJECTION SUBCUTANEOUS at 08:00

## 2018-10-26 RX ADMIN — AMITRIPTYLINE HYDROCHLORIDE 25 MG: 25 TABLET, FILM COATED ORAL at 21:02

## 2018-10-26 RX ADMIN — Medication 10 ML: at 21:02

## 2018-10-26 RX ADMIN — METOPROLOL TARTRATE 50 MG: 50 TABLET ORAL at 08:00

## 2018-10-26 RX ADMIN — METOPROLOL TARTRATE 50 MG: 50 TABLET ORAL at 21:02

## 2018-10-26 RX ADMIN — POTASSIUM CHLORIDE 20 MEQ: 1.5 POWDER, FOR SOLUTION ORAL at 15:18

## 2018-10-26 ASSESSMENT — PAIN DESCRIPTION - PAIN TYPE: TYPE: ACUTE PAIN

## 2018-10-26 ASSESSMENT — PAIN SCALES - GENERAL
PAINLEVEL_OUTOF10: 1
PAINLEVEL_OUTOF10: 0
PAINLEVEL_OUTOF10: 0
PAINLEVEL_OUTOF10: 3

## 2018-10-26 ASSESSMENT — PAIN DESCRIPTION - LOCATION
LOCATION: BACK
LOCATION: BACK

## 2018-10-26 NOTE — CONSULTS
Relation Age of Onset    Heart Disease Mother         CAD    Diabetes Mother      Social History   Substance Use Topics    Smoking status: Never Smoker    Smokeless tobacco: Never Used    Alcohol use No      Comment: very rare       Allergies   Allergen Reactions    Lisinopril      cough     Current Facility-Administered Medications   Medication Dose Route Frequency Provider Last Rate Last Dose    traMADol (ULTRAM) tablet 50 mg  50 mg Oral Q6H PRN Chelly Steward MD   50 mg at 10/26/18 1225    aspirin chewable tablet 81 mg  81 mg Oral Daily Chelly Steward MD   81 mg at 10/26/18 1518    diltiazem 125 mg in dextrose 5 % 125 mL infusion  5 mg/hr Intravenous Continuous Cliffshekhar Seay MD 5 mL/hr at 10/26/18 0800 5 mg/hr at 10/26/18 0800    amitriptyline (ELAVIL) tablet 25 mg  25 mg Oral Nightly Chelly Steward MD   25 mg at 10/25/18 2314    atorvastatin (LIPITOR) tablet 20 mg  20 mg Oral Daily Chelly Steward MD   20 mg at 10/26/18 0800    metoprolol tartrate (LOPRESSOR) tablet 50 mg  50 mg Oral BID Chelly Steward MD   50 mg at 10/26/18 0800    oyster shell calcium/vitamin D 250-125 MG-UNIT per tablet 250 mg  1 tablet Oral BID Chelly Steward MD   250 mg at 10/26/18 0800    multivitamin 1 tablet  1 tablet Oral Daily Chelly Steward MD   1 tablet at 10/26/18 0800    sodium chloride flush 0.9 % injection 10 mL  10 mL Intravenous 2 times per day Chelly Steward MD   10 mL at 10/26/18 0800    sodium chloride flush 0.9 % injection 10 mL  10 mL Intravenous PRN Chelly Steward MD        magnesium hydroxide (MILK OF MAGNESIA) 400 MG/5ML suspension 30 mL  30 mL Oral Daily PRN Chelly Steward MD        ondansetron [facility]) injection 4 mg  4 mg Intravenous Q6H PRN Chelly Steward MD        enoxaparin (LOVENOX) injection 80 mg  1 mg/kg Subcutaneous BID Chelly Steward MD   80 mg at 10/26/18 0800    perflutren lipid microspheres (DEFINITY) injection 1.65 mg  1.5 mL Intravenous ONCE PRN Maychristenn Stalling Terrell Erwin MD           Physical Exam:   /66   Pulse 71   Temp 98.9 °F (37.2 °C) (Oral)   Resp 16   Ht 5' 2\" (1.575 m)   Wt 176 lb 5.9 oz (80 kg)   SpO2 92%   Breastfeeding? No   BMI 32.26 kg/m²     Intake/Output Summary (Last 24 hours) at 10/26/18 1636  Last data filed at 10/26/18 1400   Gross per 24 hour   Intake              720 ml   Output             2200 ml   Net            -1480 ml     Wt Readings from Last 2 Encounters:   10/26/18 176 lb 5.9 oz (80 kg)   10/25/18 182 lb (82.6 kg)     Constitutional: She is oriented to person, place, and time. She appears well-developed and well-nourished. In no acute distress. Head: Normocephalic and atraumatic. Neck: Neck supple. No JVD present. Carotid bruit is not present. No mass and no thyromegaly present. No lymphadenopathy present. Cardiovascular: Irreg irreg, normal heart sounds and intact distal pulses. Exam reveals no gallop and no friction rub. No murmur heard. Pulmonary/Chest: Effort normal and breath sounds normal. No respiratory distress. She has no wheezes, rhonchi or rales. Abdominal: Soft, non-tender. Bowel sounds and aorta are normal. She exhibits no organomegaly, mass or bruit. Extremities: No edema, cyanosis, or clubbing. Pulses are 2+ radial/carotid/dorsalis pedis and posterior tibial bilaterally. Neurological: She is alert and oriented to person, place, and time. She has normal reflexes. No cranial nerve deficit. Coordination normal.   Skin: Skin is warm and dry. There is no rash or diaphoresis. Psychiatric: She has a normal mood and affect.  Her speech is normal and behavior is normal.     EKG Interpretation: Atrial fibrillation    Lab Review:   Lab Results   Component Value Date    TRIG 98 03/08/2018    HDL 63 03/08/2018    HDL 42 05/15/2012    LDLCALC 58 03/08/2018    LABVLDL 20 03/08/2018     Lab Results   Component Value Date     10/26/2018    K 3.8 10/26/2018    BUN 24 10/26/2018    CREATININE 0.8 10/26/2018

## 2018-10-26 NOTE — PROGRESS NOTES
4 Eyes Skin Assessment     The patient is being assess for  Admission    I agree that 2 RN's have performed a thorough Head to Toe Skin Assessment on the patient. ALL assessment sites listed below have been assessed. Areas assessed by both nurses: Raji/Yaneli  [x]   Head, Face, and Ears   [x]   Shoulders, Back, and Chest  [x]   Arms, Elbows, and Hands   [x]   Coccyx, Sacrum, and IschIum  [x]   Legs, Feet, and Heels        Does the Patient have Skin Breakdown?   No         Carrington Prevention initiated:  Yes   Wound Care Orders initiated:  No      [facility] nurse consulted for Pressure Injury (Stage 3,4, Unstageable, DTI, NWPT, and Complex wounds), New and Established Ostomies:  No      Nurse 1 eSignature: Electronically signed by Soham Pope RN on 10/26/18 at 12:06 AM    **SHARE this note so that the co-signing nurse is able to place an eSignature**    Nurse 2 eSignature: Electronically signed by Kelly Fitch RN on 10/26/18 at 12:06 AM

## 2018-10-26 NOTE — H&P
ONE TABLET BY MOUTH IN THE EVENING 9/17/18  Yes Catalina Whyte MD   amLODIPine (NORVASC) 5 MG tablet TAKE ONE TABLET BY MOUTH ONCE DAILY 6/25/18  Yes Catalina Whyte MD   atorvastatin (LIPITOR) 20 MG tablet TAKE ONE TABLET BY MOUTH ONCE DAILY 5/16/18  Yes Catalina Whyte MD   fish oil-omega-3 fatty acids 1000 MG capsule Take 2 g by mouth daily. Yes Historical Provider, MD   calcium-vitamin D (OSCAL) 250-125 MG-UNIT per tablet Take 1 tablet by mouth 2 times daily. Yes Historical Provider, MD   multivitamin [facility]) per tablet Take 1 tablet by mouth daily. Yes Historical Provider, MD   aspirin 325 MG tablet Take 325 mg by mouth daily. Yes Historical Provider, MD       Allergies:  Lisinopril    Social History:      The patient currently lives With grandchildren. 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 She has never used smokeless tobacco.  ETOH:   reports that she does not drink alcohol. Family History:       Reviewed in detail and negative for DM, CAD, Cancer, CVA. Positive as follows:    Family History   Problem Relation Age of Onset    Heart Disease Mother         CAD    Diabetes Mother        REVIEW OF SYSTEMS:   Pertinent positives as noted in the HPI. All other systems reviewed and negative. PHYSICAL EXAM PERFORMED:    /72   Pulse 96   Temp 97.8 °F (36.6 °C) (Oral)   Resp 18   Ht 5' 2\" (1.575 m)   Wt 174 lb 4.8 oz (79.1 kg)   SpO2 95%   Breastfeeding? No   BMI 31.88 kg/m²     General appearance:  No apparent distress, appears stated age and cooperative. HEENT:  Normal cephalic, atraumatic without obvious deformity. Pupils equal, round, and reactive to light. Extra ocular muscles intact. Conjunctivae/corneas clear. Neck: Supple, with full range of motion. No jugular venous distention. Trachea midline.   Respiratory:  Fine basilar crackles  Cardiovascular: Irregularly irregular  Abdomen: Soft,

## 2018-10-26 NOTE — TELEPHONE ENCOUNTER
Arcenio Lipscomb called back, insisted we had to give the patient an appt next week, for hosp follow up.   appt made 11-1

## 2018-10-26 NOTE — CARE COORDINATION
INITIAL CASE MANAGEMENT ASSESSMENT    Reviewed chart, met with patient's granddaughter Juancho Pepper to assess possible discharge needs. Patient off floor for testing. Explained Case Management role/services. Living Situation: Patient lives with her granddaughter Vonnie Ramirez and her family. Patient is independent with all aspects of her care. She does not use an assistive device to ambulate. DME: none     Active Services: none     Transportation: patient still drives, will have transportation home     Medications: is able to afford all medications    PCP: Yes      HD/PD: no     PT/OT Recs: not ordered    PLAN/COMMENTS: Patient plans on returning home with her granddaughter. Someone is with her 2hrs. No needs identified.      Electronically signed by VERENA Bal, ALEKSANDRA on 10/26/18 at 12:26 PM

## 2018-10-26 NOTE — PROGRESS NOTES
Patient arrived to the unit via stretcher from [facility] ED, NAD, Alert x4, Up adlib. VSS- Afib-Cardizem gtt @ 5 ml/hr  MD notified of patients arrival.  Orders released. Admission completed  Skin assessed. Patient oriented to room, plan of care discussed.

## 2018-10-26 NOTE — PLAN OF CARE
Problem: Pain:  Goal: Pain level will decrease  Pain level will decrease   Outcome: Ongoing  Pain/discomfort being managed with PRN analgesics per MD orders. Pt able to express presence and absence of pain and rate pain appropriately using numerical scale. Electronically signed by Lee Ann Manriquez RN on 10/26/2018 at 4:07 PM      Problem: OXYGENATION/RESPIRATORY FUNCTION  Goal: Patient will maintain patent airway  Outcome: Ongoing  Educated patient/family on CHF signs/ symptoms, causes, treatments, 1200 ml fluid intake, daily weights, discharge medications, low sodium diet, activity and follow-up. Pt to call physician if weight gain of 3 pounds in one day or 5 pounds in one week.   Electronically signed by Lee Ann Manriquez RN on 10/26/2018 at 4:07 PM

## 2018-10-26 NOTE — TELEPHONE ENCOUNTER
[facility] hospital calling to get the patient an appointment for Nov 2nd or before. Was admitted with new DX of Afib RVR and new heart failure.   Best contact phone number [phone], Nurse Brittney Al, Heart Failure Coordinator at [facility].

## 2018-10-27 LAB
ANION GAP SERPL CALCULATED.3IONS-SCNC: 12 MMOL/L (ref 3–16)
BASOPHILS ABSOLUTE: 0.1 K/UL (ref 0–0.2)
BASOPHILS RELATIVE PERCENT: 1.7 %
BUN BLDV-MCNC: 22 MG/DL (ref 7–20)
CALCIUM SERPL-MCNC: 8.9 MG/DL (ref 8.3–10.6)
CHLORIDE BLD-SCNC: 104 MMOL/L (ref 99–110)
CO2: 25 MMOL/L (ref 21–32)
CREAT SERPL-MCNC: 0.8 MG/DL (ref 0.6–1.2)
EOSINOPHILS ABSOLUTE: 0.4 K/UL (ref 0–0.6)
EOSINOPHILS RELATIVE PERCENT: 5.5 %
GFR AFRICAN AMERICAN: >60
GFR NON-AFRICAN AMERICAN: >60
GLUCOSE BLD-MCNC: 99 MG/DL (ref 70–99)
HCT VFR BLD CALC: 37.1 % (ref 36–48)
HEMOGLOBIN: 11.8 G/DL (ref 12–16)
LYMPHOCYTES ABSOLUTE: 2 K/UL (ref 1–5.1)
LYMPHOCYTES RELATIVE PERCENT: 30.9 %
MCH RBC QN AUTO: 28.9 PG (ref 26–34)
MCHC RBC AUTO-ENTMCNC: 31.9 G/DL (ref 31–36)
MCV RBC AUTO: 90.8 FL (ref 80–100)
MONOCYTES ABSOLUTE: 0.7 K/UL (ref 0–1.3)
MONOCYTES RELATIVE PERCENT: 11 %
NEUTROPHILS ABSOLUTE: 3.3 K/UL (ref 1.7–7.7)
NEUTROPHILS RELATIVE PERCENT: 50.9 %
PDW BLD-RTO: 14.5 % (ref 12.4–15.4)
PLATELET # BLD: 140 K/UL (ref 135–450)
PMV BLD AUTO: 11.2 FL (ref 5–10.5)
POTASSIUM REFLEX MAGNESIUM: 4.2 MMOL/L (ref 3.5–5.1)
RBC # BLD: 4.08 M/UL (ref 4–5.2)
SODIUM BLD-SCNC: 141 MMOL/L (ref 136–145)
WBC # BLD: 6.5 K/UL (ref 4–11)

## 2018-10-27 PROCEDURE — 6370000000 HC RX 637 (ALT 250 FOR IP): Performed by: INTERNAL MEDICINE

## 2018-10-27 PROCEDURE — 6370000000 HC RX 637 (ALT 250 FOR IP): Performed by: HOSPITALIST

## 2018-10-27 PROCEDURE — 80048 BASIC METABOLIC PNL TOTAL CA: CPT

## 2018-10-27 PROCEDURE — 99233 SBSQ HOSP IP/OBS HIGH 50: CPT | Performed by: INTERNAL MEDICINE

## 2018-10-27 PROCEDURE — 94760 N-INVAS EAR/PLS OXIMETRY 1: CPT

## 2018-10-27 PROCEDURE — 2060000000 HC ICU INTERMEDIATE R&B

## 2018-10-27 PROCEDURE — 2580000003 HC RX 258: Performed by: HOSPITALIST

## 2018-10-27 PROCEDURE — 36415 COLL VENOUS BLD VENIPUNCTURE: CPT

## 2018-10-27 PROCEDURE — 85025 COMPLETE CBC W/AUTO DIFF WBC: CPT

## 2018-10-27 RX ORDER — DILTIAZEM HYDROCHLORIDE 120 MG/1
120 CAPSULE, COATED, EXTENDED RELEASE ORAL DAILY
Status: DISCONTINUED | OUTPATIENT
Start: 2018-10-27 | End: 2018-10-28

## 2018-10-27 RX ADMIN — DILTIAZEM HYDROCHLORIDE 120 MG: 120 CAPSULE, COATED, EXTENDED RELEASE ORAL at 17:30

## 2018-10-27 RX ADMIN — CALCIUM CARBONATE-CHOLECALCIFEROL TAB 250 MG-125 UNIT 250 MG: 250-125 TAB at 09:17

## 2018-10-27 RX ADMIN — DIGOXIN 250 MCG: 250 TABLET ORAL at 09:17

## 2018-10-27 RX ADMIN — TRAMADOL HYDROCHLORIDE 50 MG: 50 TABLET, FILM COATED ORAL at 08:05

## 2018-10-27 RX ADMIN — Medication 10 ML: at 20:09

## 2018-10-27 RX ADMIN — Medication 10 ML: at 09:17

## 2018-10-27 RX ADMIN — APIXABAN 5 MG: 5 TABLET, FILM COATED ORAL at 09:17

## 2018-10-27 RX ADMIN — AMITRIPTYLINE HYDROCHLORIDE 25 MG: 25 TABLET, FILM COATED ORAL at 20:09

## 2018-10-27 RX ADMIN — APIXABAN 5 MG: 5 TABLET, FILM COATED ORAL at 20:09

## 2018-10-27 RX ADMIN — THERA TABS 1 TABLET: TAB at 09:17

## 2018-10-27 RX ADMIN — ASPIRIN 81 MG CHEWABLE TABLET 81 MG: 81 TABLET CHEWABLE at 09:17

## 2018-10-27 RX ADMIN — CALCIUM CARBONATE-CHOLECALCIFEROL TAB 250 MG-125 UNIT 250 MG: 250-125 TAB at 20:09

## 2018-10-27 RX ADMIN — TRAMADOL HYDROCHLORIDE 50 MG: 50 TABLET, FILM COATED ORAL at 14:10

## 2018-10-27 RX ADMIN — ATORVASTATIN CALCIUM 20 MG: 20 TABLET, FILM COATED ORAL at 09:17

## 2018-10-27 RX ADMIN — METOPROLOL TARTRATE 50 MG: 50 TABLET ORAL at 09:17

## 2018-10-27 ASSESSMENT — PAIN DESCRIPTION - LOCATION
LOCATION: BACK
LOCATION: BACK

## 2018-10-27 ASSESSMENT — PAIN SCALES - GENERAL
PAINLEVEL_OUTOF10: 0
PAINLEVEL_OUTOF10: 3
PAINLEVEL_OUTOF10: 0
PAINLEVEL_OUTOF10: 3
PAINLEVEL_OUTOF10: 3
PAINLEVEL_OUTOF10: 1

## 2018-10-27 ASSESSMENT — PAIN DESCRIPTION - PAIN TYPE
TYPE: ACUTE PAIN
TYPE: ACUTE PAIN

## 2018-10-27 NOTE — PROGRESS NOTES
Hospitalist Progress Note    CC:   Palpitation     Admit date: 10/25/2018  Days in hospital:  2    Subjective:     Patient continues to be tachycardic. This morning she reported some lightheadedness and some shortness of breath. No other complaints. ROS:   Review of Systems   Constitutional: Negative for chills and fever. Respiratory: Negative for cough. Cardiovascular: Negative for chest pain. Gastrointestinal: Negative for abdominal pain, constipation and diarrhea. Genitourinary: Negative for dysuria and urgency. Neurological: Negative for headaches. Objective:    /69   Pulse 110   Temp 97.9 °F (36.6 °C) (Oral)   Resp 18   Ht 5' 2\" (1.575 m)   Wt 176 lb 5.9 oz (80 kg)   SpO2 93%   Breastfeeding? No   BMI 32.26 kg/m²     Gen: alert, NAD  HEENT: NC/AT, moist mucous membranes, no oropharyngeal erythema or exudate  Neck: supple, trachea midline, no anterior cervical or SC LAD  Heart: Normal s1/s2, iregular irregular rhythm, no murmurs, gallops, or rubs. Lungs: CTA bilaterally, no wheezing, no rales, no rhonchi, no use of accessory muscles  Abd: bowel sounds present, soft, nondistended, none tender. Extrem: No clubbing, cyanosis, no edema  Psych: A & O x3 , affect appropriate  Neuro: grossly intact, moves all four extremities spontaneously.       Medications:  Scheduled Meds:   aspirin  81 mg Oral Daily    apixaban  5 mg Oral BID    digoxin  250 mcg Oral Daily    amitriptyline  25 mg Oral Nightly    atorvastatin  20 mg Oral Daily    metoprolol tartrate  50 mg Oral BID    oyster shell calcium/vitamin D  1 tablet Oral BID    multivitamin  1 tablet Oral Daily    sodium chloride flush  10 mL Intravenous 2 times per day       PRN Meds:  traMADol, sodium chloride flush, magnesium hydroxide, ondansetron, perflutren lipid microspheres    IV:        Intake/Output Summary (Last 24 hours) at 10/27/18 1213  Last data filed at 10/27/18 0934   Gross per 24 hour   Intake

## 2018-10-28 VITALS
TEMPERATURE: 98 F | BODY MASS INDEX: 32.54 KG/M2 | DIASTOLIC BLOOD PRESSURE: 72 MMHG | WEIGHT: 176.81 LBS | HEIGHT: 62 IN | HEART RATE: 80 BPM | SYSTOLIC BLOOD PRESSURE: 119 MMHG | RESPIRATION RATE: 16 BRPM | OXYGEN SATURATION: 92 %

## 2018-10-28 LAB
ANION GAP SERPL CALCULATED.3IONS-SCNC: 12 MMOL/L (ref 3–16)
BASOPHILS ABSOLUTE: 0.1 K/UL (ref 0–0.2)
BASOPHILS RELATIVE PERCENT: 0.8 %
BUN BLDV-MCNC: 25 MG/DL (ref 7–20)
CALCIUM SERPL-MCNC: 9 MG/DL (ref 8.3–10.6)
CHLORIDE BLD-SCNC: 100 MMOL/L (ref 99–110)
CO2: 24 MMOL/L (ref 21–32)
CREAT SERPL-MCNC: 0.8 MG/DL (ref 0.6–1.2)
EOSINOPHILS ABSOLUTE: 0.3 K/UL (ref 0–0.6)
EOSINOPHILS RELATIVE PERCENT: 2.9 %
GFR AFRICAN AMERICAN: >60
GFR NON-AFRICAN AMERICAN: >60
GLUCOSE BLD-MCNC: 110 MG/DL (ref 70–99)
HCT VFR BLD CALC: 35.6 % (ref 36–48)
HEMOGLOBIN: 11.7 G/DL (ref 12–16)
LYMPHOCYTES ABSOLUTE: 2.2 K/UL (ref 1–5.1)
LYMPHOCYTES RELATIVE PERCENT: 21.1 %
MCH RBC QN AUTO: 29.7 PG (ref 26–34)
MCHC RBC AUTO-ENTMCNC: 32.9 G/DL (ref 31–36)
MCV RBC AUTO: 90.4 FL (ref 80–100)
MONOCYTES ABSOLUTE: 0.9 K/UL (ref 0–1.3)
MONOCYTES RELATIVE PERCENT: 8.8 %
NEUTROPHILS ABSOLUTE: 6.8 K/UL (ref 1.7–7.7)
NEUTROPHILS RELATIVE PERCENT: 66.4 %
PDW BLD-RTO: 14.6 % (ref 12.4–15.4)
PLATELET # BLD: 138 K/UL (ref 135–450)
PMV BLD AUTO: 11.2 FL (ref 5–10.5)
POTASSIUM REFLEX MAGNESIUM: 4.2 MMOL/L (ref 3.5–5.1)
RBC # BLD: 3.94 M/UL (ref 4–5.2)
SODIUM BLD-SCNC: 136 MMOL/L (ref 136–145)
WBC # BLD: 10.2 K/UL (ref 4–11)

## 2018-10-28 PROCEDURE — 94760 N-INVAS EAR/PLS OXIMETRY 1: CPT

## 2018-10-28 PROCEDURE — 2580000003 HC RX 258: Performed by: HOSPITALIST

## 2018-10-28 PROCEDURE — 80048 BASIC METABOLIC PNL TOTAL CA: CPT

## 2018-10-28 PROCEDURE — 36415 COLL VENOUS BLD VENIPUNCTURE: CPT

## 2018-10-28 PROCEDURE — 6370000000 HC RX 637 (ALT 250 FOR IP): Performed by: HOSPITALIST

## 2018-10-28 PROCEDURE — 6370000000 HC RX 637 (ALT 250 FOR IP): Performed by: INTERNAL MEDICINE

## 2018-10-28 PROCEDURE — 99233 SBSQ HOSP IP/OBS HIGH 50: CPT | Performed by: INTERNAL MEDICINE

## 2018-10-28 PROCEDURE — 85025 COMPLETE CBC W/AUTO DIFF WBC: CPT

## 2018-10-28 RX ORDER — DILTIAZEM HYDROCHLORIDE 180 MG/1
180 CAPSULE, COATED, EXTENDED RELEASE ORAL DAILY
Qty: 30 CAPSULE | Refills: 3 | Status: SHIPPED | OUTPATIENT
Start: 2018-10-29 | End: 2019-01-10 | Stop reason: DRUGHIGH

## 2018-10-28 RX ORDER — DOCUSATE SODIUM 100 MG/1
100 CAPSULE, LIQUID FILLED ORAL DAILY
Status: DISCONTINUED | OUTPATIENT
Start: 2018-10-28 | End: 2018-10-28 | Stop reason: HOSPADM

## 2018-10-28 RX ORDER — DIGOXIN 250 MCG
250 TABLET ORAL DAILY
Qty: 30 TABLET | Refills: 3 | Status: SHIPPED | OUTPATIENT
Start: 2018-10-29 | End: 2019-01-05 | Stop reason: ALTCHOICE

## 2018-10-28 RX ORDER — ACETAMINOPHEN 325 MG/1
650 TABLET ORAL EVERY 4 HOURS PRN
Status: DISCONTINUED | OUTPATIENT
Start: 2018-10-28 | End: 2018-10-28 | Stop reason: HOSPADM

## 2018-10-28 RX ORDER — POLYETHYLENE GLYCOL 3350 17 G/17G
17 POWDER, FOR SOLUTION ORAL DAILY
Status: DISCONTINUED | OUTPATIENT
Start: 2018-10-28 | End: 2018-10-28 | Stop reason: HOSPADM

## 2018-10-28 RX ORDER — DILTIAZEM HYDROCHLORIDE 180 MG/1
180 CAPSULE, COATED, EXTENDED RELEASE ORAL DAILY
Status: DISCONTINUED | OUTPATIENT
Start: 2018-10-29 | End: 2018-10-28 | Stop reason: HOSPADM

## 2018-10-28 RX ADMIN — DIGOXIN 250 MCG: 250 TABLET ORAL at 09:59

## 2018-10-28 RX ADMIN — ATORVASTATIN CALCIUM 20 MG: 20 TABLET, FILM COATED ORAL at 10:00

## 2018-10-28 RX ADMIN — APIXABAN 5 MG: 5 TABLET, FILM COATED ORAL at 09:59

## 2018-10-28 RX ADMIN — TRAMADOL HYDROCHLORIDE 50 MG: 50 TABLET, FILM COATED ORAL at 07:23

## 2018-10-28 RX ADMIN — CALCIUM CARBONATE-CHOLECALCIFEROL TAB 250 MG-125 UNIT 250 MG: 250-125 TAB at 10:00

## 2018-10-28 RX ADMIN — THERA TABS 1 TABLET: TAB at 10:00

## 2018-10-28 RX ADMIN — DILTIAZEM HYDROCHLORIDE 120 MG: 120 CAPSULE, COATED, EXTENDED RELEASE ORAL at 09:59

## 2018-10-28 RX ADMIN — ASPIRIN 81 MG CHEWABLE TABLET 81 MG: 81 TABLET CHEWABLE at 09:59

## 2018-10-28 RX ADMIN — ACETAMINOPHEN 650 MG: 325 TABLET, FILM COATED ORAL at 13:38

## 2018-10-28 RX ADMIN — Medication 10 ML: at 10:00

## 2018-10-28 RX ADMIN — DOCUSATE SODIUM 100 MG: 100 CAPSULE, LIQUID FILLED ORAL at 13:38

## 2018-10-28 RX ADMIN — POLYETHYLENE GLYCOL 3350 17 G: 17 POWDER, FOR SOLUTION ORAL at 13:38

## 2018-10-28 ASSESSMENT — PAIN DESCRIPTION - DESCRIPTORS: DESCRIPTORS: PRESSURE

## 2018-10-28 ASSESSMENT — PAIN DESCRIPTION - LOCATION: LOCATION: BACK;SHOULDER;NECK

## 2018-10-28 ASSESSMENT — PAIN SCALES - GENERAL
PAINLEVEL_OUTOF10: 5
PAINLEVEL_OUTOF10: 5
PAINLEVEL_OUTOF10: 1

## 2018-10-28 ASSESSMENT — PAIN DESCRIPTION - FREQUENCY: FREQUENCY: INTERMITTENT

## 2018-10-28 ASSESSMENT — PAIN DESCRIPTION - PAIN TYPE: TYPE: ACUTE PAIN

## 2018-10-28 ASSESSMENT — PAIN DESCRIPTION - ORIENTATION: ORIENTATION: LEFT;UPPER

## 2018-10-28 NOTE — PROGRESS NOTES
Hospitalist Progress Note    CC:   Palpitation     Admit date: 10/25/2018  Days in hospital:  3     Subjective:   She denies any lightheadedness or any other complaints. No acute events overnight. ROS:   Review of Systems   Constitutional: Negative for chills and fever. Respiratory: Negative for cough. Cardiovascular: Negative for chest pain. Gastrointestinal: Negative for abdominal pain, constipation and diarrhea. Genitourinary: Negative for dysuria and urgency. Neurological: Negative for headaches. Objective:    /63   Pulse 120   Temp 98 °F (36.7 °C) (Oral)   Resp 18   Ht 5' 2\" (1.575 m)   Wt 176 lb 12.9 oz (80.2 kg)   SpO2 96%   Breastfeeding? No   BMI 32.34 kg/m²     Gen: alert, NAD  HEENT: NC/AT, moist mucous membranes, no oropharyngeal erythema or exudate  Neck: supple, trachea midline, no anterior cervical or SC LAD  Heart: Normal s1/s2, iregular irregular rhythm, no murmurs, gallops, or rubs. Lung : Clear to auscultation bilaterally  no wheezing, no rales, no rhonchi, no use of accessory muscles  Abd: bowel sounds present, soft, nondistended, none tender. Extrem: No clubbing, cyanosis, no edema  Psych: A & O x3 , affect appropriate  Neuro: grossly intact, moves all four extremities spontaneously.       Medications:  Scheduled Meds:   diltiazem  120 mg Oral Daily    aspirin  81 mg Oral Daily    apixaban  5 mg Oral BID    digoxin  250 mcg Oral Daily    amitriptyline  25 mg Oral Nightly    atorvastatin  20 mg Oral Daily    oyster shell calcium/vitamin D  1 tablet Oral BID    multivitamin  1 tablet Oral Daily    sodium chloride flush  10 mL Intravenous 2 times per day       PRN Meds:  traMADol, sodium chloride flush, magnesium hydroxide, ondansetron, perflutren lipid microspheres    IV:        Intake/Output Summary (Last 24 hours) at 10/28/18 0837  Last data filed at 10/28/18 0600   Gross per 24 hour   Intake              980 ml   Output             2350

## 2018-10-29 ENCOUNTER — TELEPHONE (OUTPATIENT)
Dept: CARDIOLOGY CLINIC | Age: 78
End: 2018-10-29

## 2018-10-29 ENCOUNTER — TELEPHONE (OUTPATIENT)
Dept: INTERNAL MEDICINE CLINIC | Age: 78
End: 2018-10-29

## 2018-10-30 ENCOUNTER — TELEPHONE (OUTPATIENT)
Dept: INTERNAL MEDICINE CLINIC | Age: 78
End: 2018-10-30

## 2018-10-30 ENCOUNTER — OFFICE VISIT (OUTPATIENT)
Dept: INTERNAL MEDICINE CLINIC | Age: 78
End: 2018-10-30
Payer: MEDICARE

## 2018-10-30 VITALS
DIASTOLIC BLOOD PRESSURE: 80 MMHG | HEIGHT: 62 IN | TEMPERATURE: 98.1 F | WEIGHT: 171 LBS | SYSTOLIC BLOOD PRESSURE: 130 MMHG | OXYGEN SATURATION: 93 % | HEART RATE: 73 BPM | BODY MASS INDEX: 31.47 KG/M2

## 2018-10-30 DIAGNOSIS — I48.91 ATRIAL FIBRILLATION, UNSPECIFIED TYPE (HCC): Primary | ICD-10-CM

## 2018-10-30 DIAGNOSIS — B02.8 HERPES ZOSTER WITH COMPLICATION: Primary | ICD-10-CM

## 2018-10-30 DIAGNOSIS — I48.0 PAROXYSMAL ATRIAL FIBRILLATION (HCC): ICD-10-CM

## 2018-10-30 DIAGNOSIS — B02.29 NEURALGIA, POST-HERPETIC: ICD-10-CM

## 2018-10-30 LAB
EKG ATRIAL RATE: 288 BPM
EKG DIAGNOSIS: NORMAL
EKG Q-T INTERVAL: 342 MS
EKG QRS DURATION: 86 MS
EKG QTC CALCULATION (BAZETT): 436 MS
EKG R AXIS: 15 DEGREES
EKG T AXIS: 49 DEGREES
EKG VENTRICULAR RATE: 98 BPM

## 2018-10-30 PROCEDURE — G8598 ASA/ANTIPLAT THER USED: HCPCS | Performed by: INTERNAL MEDICINE

## 2018-10-30 PROCEDURE — G8427 DOCREV CUR MEDS BY ELIG CLIN: HCPCS | Performed by: INTERNAL MEDICINE

## 2018-10-30 PROCEDURE — 1123F ACP DISCUSS/DSCN MKR DOCD: CPT | Performed by: INTERNAL MEDICINE

## 2018-10-30 PROCEDURE — G8484 FLU IMMUNIZE NO ADMIN: HCPCS | Performed by: INTERNAL MEDICINE

## 2018-10-30 PROCEDURE — 1111F DSCHRG MED/CURRENT MED MERGE: CPT | Performed by: INTERNAL MEDICINE

## 2018-10-30 PROCEDURE — 1036F TOBACCO NON-USER: CPT | Performed by: INTERNAL MEDICINE

## 2018-10-30 PROCEDURE — 1090F PRES/ABSN URINE INCON ASSESS: CPT | Performed by: INTERNAL MEDICINE

## 2018-10-30 PROCEDURE — 4040F PNEUMOC VAC/ADMIN/RCVD: CPT | Performed by: INTERNAL MEDICINE

## 2018-10-30 PROCEDURE — 1101F PT FALLS ASSESS-DOCD LE1/YR: CPT | Performed by: INTERNAL MEDICINE

## 2018-10-30 PROCEDURE — G8417 CALC BMI ABV UP PARAM F/U: HCPCS | Performed by: INTERNAL MEDICINE

## 2018-10-30 PROCEDURE — 99213 OFFICE O/P EST LOW 20 MIN: CPT | Performed by: INTERNAL MEDICINE

## 2018-10-30 PROCEDURE — G8399 PT W/DXA RESULTS DOCUMENT: HCPCS | Performed by: INTERNAL MEDICINE

## 2018-10-30 RX ORDER — VALACYCLOVIR HYDROCHLORIDE 1 G/1
1000 TABLET, FILM COATED ORAL 3 TIMES DAILY
Qty: 14 TABLET | Refills: 0 | Status: SHIPPED | OUTPATIENT
Start: 2018-10-30 | End: 2019-01-05 | Stop reason: ALTCHOICE

## 2018-10-30 RX ORDER — PREDNISONE 20 MG/1
20 TABLET ORAL DAILY
Qty: 10 TABLET | Refills: 0 | Status: SHIPPED | OUTPATIENT
Start: 2018-10-30 | End: 2018-11-09

## 2018-10-30 RX ORDER — GABAPENTIN 300 MG/1
300 CAPSULE ORAL 3 TIMES DAILY
Qty: 90 CAPSULE | Refills: 1 | Status: SHIPPED | OUTPATIENT
Start: 2018-10-30 | End: 2018-12-24 | Stop reason: SDUPTHER

## 2018-10-30 NOTE — PROGRESS NOTES
(ULTRACET) 37.5-325 MG per tablet     Sig: Take 1 tablet by mouth every 4 hours as needed for Pain for up to 7 days. .     Dispense:  42 tablet     Refill:  0        Return in about 3 months (around 1/30/2019), or if symptoms worsen or fail to improve. There are no Patient Instructions on file for this visit.

## 2018-11-01 ENCOUNTER — TELEPHONE (OUTPATIENT)
Dept: CARDIOLOGY CLINIC | Age: 78
End: 2018-11-01

## 2018-11-01 NOTE — LETTER
[facility] HEART [address] [place]  Phone: [phone]  Fax: [phone]    Hussain Schwarz MD        November 2, 2018     Patient: Mena Rodrigez   YOB: 1940   Date of Visit: 11/1/2018       To Whom It May Concern: It is my medical opinion that Tyrese Holder is stable from a cardiovascular standpoint to return to work with no restrictions. If you have any questions or concerns, please don't hesitate to call.     Sincerely,        Hussain Schwarz MD

## 2018-11-01 NOTE — TELEPHONE ENCOUNTER
Dr. Byrd Divers     Patient seen in hospital for afib with RVR. Discharged on Cardizem 180 mg and digoxin 250 mcg. Planned for BLOSSOM guided cardioversion as outpatient. When Matt Tubbs called to schedule patient was unable to make any of the available times/dates. She is scheduled to see Dr. Ravindra Guzmán in office on 11/12/18 and will then plan cardioversion. Are you willing to sign letter for return to work?

## 2018-11-01 NOTE — TELEPHONE ENCOUNTER
Patient called in statin that she needs a release note to return to work.      Please fax to:  Attn: Gautam Jacobo [phone]

## 2018-11-09 ASSESSMENT — ENCOUNTER SYMPTOMS
SHORTNESS OF BREATH: 1
COUGH: 1
NAUSEA: 0
ABDOMINAL PAIN: 0
VOMITING: 0
CONSTIPATION: 0
DIARRHEA: 0

## 2018-11-12 ENCOUNTER — OFFICE VISIT (OUTPATIENT)
Dept: CARDIOLOGY CLINIC | Age: 78
End: 2018-11-12
Payer: MEDICARE

## 2018-11-12 VITALS
DIASTOLIC BLOOD PRESSURE: 84 MMHG | OXYGEN SATURATION: 95 % | SYSTOLIC BLOOD PRESSURE: 136 MMHG | WEIGHT: 169 LBS | BODY MASS INDEX: 31.1 KG/M2 | HEIGHT: 62 IN | HEART RATE: 74 BPM

## 2018-11-12 DIAGNOSIS — I48.91 ATRIAL FIBRILLATION WITH RVR (HCC): ICD-10-CM

## 2018-11-12 DIAGNOSIS — R06.02 SOB (SHORTNESS OF BREATH): ICD-10-CM

## 2018-11-12 DIAGNOSIS — R00.2 PALPITATIONS: Primary | ICD-10-CM

## 2018-11-12 DIAGNOSIS — I10 ESSENTIAL HYPERTENSION: ICD-10-CM

## 2018-11-12 DIAGNOSIS — R60.0 BILATERAL LOWER EXTREMITY EDEMA: ICD-10-CM

## 2018-11-12 DIAGNOSIS — E78.2 MIXED HYPERLIPIDEMIA: ICD-10-CM

## 2018-11-12 LAB — DIGOXIN LEVEL: 2.4 NG/ML (ref 0.8–2)

## 2018-11-12 PROCEDURE — 99214 OFFICE O/P EST MOD 30 MIN: CPT | Performed by: INTERNAL MEDICINE

## 2018-11-12 PROCEDURE — G8417 CALC BMI ABV UP PARAM F/U: HCPCS | Performed by: INTERNAL MEDICINE

## 2018-11-12 PROCEDURE — G8427 DOCREV CUR MEDS BY ELIG CLIN: HCPCS | Performed by: INTERNAL MEDICINE

## 2018-11-12 PROCEDURE — 1090F PRES/ABSN URINE INCON ASSESS: CPT | Performed by: INTERNAL MEDICINE

## 2018-11-12 PROCEDURE — 1123F ACP DISCUSS/DSCN MKR DOCD: CPT | Performed by: INTERNAL MEDICINE

## 2018-11-12 PROCEDURE — 4040F PNEUMOC VAC/ADMIN/RCVD: CPT | Performed by: INTERNAL MEDICINE

## 2018-11-12 PROCEDURE — 1111F DSCHRG MED/CURRENT MED MERGE: CPT | Performed by: INTERNAL MEDICINE

## 2018-11-12 PROCEDURE — G8598 ASA/ANTIPLAT THER USED: HCPCS | Performed by: INTERNAL MEDICINE

## 2018-11-12 PROCEDURE — G8484 FLU IMMUNIZE NO ADMIN: HCPCS | Performed by: INTERNAL MEDICINE

## 2018-11-12 PROCEDURE — 93000 ELECTROCARDIOGRAM COMPLETE: CPT | Performed by: INTERNAL MEDICINE

## 2018-11-12 PROCEDURE — 1101F PT FALLS ASSESS-DOCD LE1/YR: CPT | Performed by: INTERNAL MEDICINE

## 2018-11-12 PROCEDURE — 1036F TOBACCO NON-USER: CPT | Performed by: INTERNAL MEDICINE

## 2018-11-12 PROCEDURE — G8399 PT W/DXA RESULTS DOCUMENT: HCPCS | Performed by: INTERNAL MEDICINE

## 2018-11-12 RX ORDER — PREDNISONE 20 MG/1
20 TABLET ORAL DAILY
COMMUNITY
End: 2019-01-05 | Stop reason: ALTCHOICE

## 2018-11-12 NOTE — PROGRESS NOTES
questions. This note was scribed in the presence of Dr Monik Wells, by Chris Arriola RN  Physician Attestation:  The scribes documentation has been prepared under my direction and personally reviewed by me in its entirety. I confirm that the note above accurately reflects all work, treatment, procedures, and medical decision making performed by me.     Sincerely,  Maria Teresa Driver MD      [facility] [facility]  Ph: [phone]  Fax: [phone]

## 2018-11-13 ENCOUNTER — TELEPHONE (OUTPATIENT)
Dept: CARDIOLOGY CLINIC | Age: 78
End: 2018-11-13

## 2018-11-19 RX ORDER — TRIAMTERENE AND HYDROCHLOROTHIAZIDE 37.5; 25 MG/1; MG/1
TABLET ORAL
Qty: 30 TABLET | Refills: 5 | Status: SHIPPED | OUTPATIENT
Start: 2018-11-19 | End: 2019-01-10 | Stop reason: ALTCHOICE

## 2018-11-26 RX ORDER — ATORVASTATIN CALCIUM 20 MG/1
TABLET, FILM COATED ORAL
Qty: 30 TABLET | Refills: 1 | Status: SHIPPED | OUTPATIENT
Start: 2018-11-26 | End: 2019-01-23 | Stop reason: SDUPTHER

## 2018-11-27 ENCOUNTER — TELEPHONE (OUTPATIENT)
Dept: INTERNAL MEDICINE CLINIC | Age: 78
End: 2018-11-27

## 2018-11-27 NOTE — TELEPHONE ENCOUNTER
ivis--harrison calling b/c pt was prescribed apixaban (ELIQUIS) 5 MG TABS tablet one twice daily while in the hospital. Pt now needs a new rx.

## 2018-12-13 RX ORDER — AMITRIPTYLINE HYDROCHLORIDE 25 MG/1
TABLET, FILM COATED ORAL
Qty: 90 TABLET | Refills: 0 | Status: SHIPPED | OUTPATIENT
Start: 2018-12-13 | End: 2019-03-11 | Stop reason: SDUPTHER

## 2018-12-24 DIAGNOSIS — B02.29 NEURALGIA, POST-HERPETIC: ICD-10-CM

## 2018-12-26 RX ORDER — GABAPENTIN 300 MG/1
CAPSULE ORAL
Qty: 90 CAPSULE | Refills: 0 | Status: SHIPPED | OUTPATIENT
Start: 2018-12-26 | End: 2019-01-05 | Stop reason: ALTCHOICE

## 2019-01-05 ENCOUNTER — HOSPITAL ENCOUNTER (EMERGENCY)
Age: 79
Discharge: HOME | End: 2019-01-05
Attending: EMERGENCY MEDICINE
Payer: MEDICARE

## 2019-01-05 ENCOUNTER — APPOINTMENT (OUTPATIENT)
Dept: GENERAL RADIOLOGY | Age: 79
End: 2019-01-05
Payer: MEDICARE

## 2019-01-05 VITALS
DIASTOLIC BLOOD PRESSURE: 73 MMHG | HEART RATE: 116 BPM | BODY MASS INDEX: 31.49 KG/M2 | WEIGHT: 172.19 LBS | OXYGEN SATURATION: 99 % | SYSTOLIC BLOOD PRESSURE: 136 MMHG | RESPIRATION RATE: 20 BRPM | TEMPERATURE: 98.2 F

## 2019-01-05 DIAGNOSIS — J02.9 SORE THROAT: ICD-10-CM

## 2019-01-05 DIAGNOSIS — R05.9 COUGH: Primary | ICD-10-CM

## 2019-01-05 LAB — S PYO AG THROAT QL: NEGATIVE

## 2019-01-05 PROCEDURE — 99284 EMERGENCY DEPT VISIT MOD MDM: CPT

## 2019-01-05 PROCEDURE — 87081 CULTURE SCREEN ONLY: CPT

## 2019-01-05 PROCEDURE — 6370000000 HC RX 637 (ALT 250 FOR IP): Performed by: EMERGENCY MEDICINE

## 2019-01-05 PROCEDURE — 71046 X-RAY EXAM CHEST 2 VIEWS: CPT

## 2019-01-05 PROCEDURE — 87880 STREP A ASSAY W/OPTIC: CPT

## 2019-01-05 RX ORDER — ALBUTEROL SULFATE 90 UG/1
2 AEROSOL, METERED RESPIRATORY (INHALATION) SEE ADMIN INSTRUCTIONS
Qty: 1 INHALER | Refills: 1 | Status: SHIPPED | OUTPATIENT
Start: 2019-01-05 | End: 2020-02-09

## 2019-01-05 RX ORDER — BENZONATATE 100 MG/1
200 CAPSULE ORAL ONCE
Status: COMPLETED | OUTPATIENT
Start: 2019-01-05 | End: 2019-01-05

## 2019-01-05 RX ORDER — IPRATROPIUM BROMIDE AND ALBUTEROL SULFATE 2.5; .5 MG/3ML; MG/3ML
1 SOLUTION RESPIRATORY (INHALATION) ONCE
Status: COMPLETED | OUTPATIENT
Start: 2019-01-05 | End: 2019-01-05

## 2019-01-05 RX ORDER — AMLODIPINE BESYLATE 5 MG/1
TABLET ORAL
COMMUNITY
Start: 2018-11-12 | End: 2019-01-05 | Stop reason: ALTCHOICE

## 2019-01-05 RX ORDER — BENZONATATE 200 MG/1
200 CAPSULE ORAL 3 TIMES DAILY PRN
Qty: 30 CAPSULE | Refills: 0 | Status: SHIPPED | OUTPATIENT
Start: 2019-01-05 | End: 2019-01-10

## 2019-01-05 RX ORDER — BISOPROLOL FUMARATE 10 MG/1
10 TABLET ORAL DAILY
Status: ON HOLD | COMMUNITY
Start: 2018-11-15 | End: 2019-07-03

## 2019-01-05 RX ADMIN — IPRATROPIUM BROMIDE AND ALBUTEROL SULFATE 1 AMPULE: .5; 3 SOLUTION RESPIRATORY (INHALATION) at 13:25

## 2019-01-05 RX ADMIN — BENZONATATE 200 MG: 100 CAPSULE ORAL at 13:25

## 2019-01-05 RX ADMIN — IPRATROPIUM BROMIDE AND ALBUTEROL SULFATE 1 AMPULE: .5; 3 SOLUTION RESPIRATORY (INHALATION) at 12:17

## 2019-01-05 ASSESSMENT — ENCOUNTER SYMPTOMS
TROUBLE SWALLOWING: 0
SHORTNESS OF BREATH: 0
SORE THROAT: 1
WHEEZING: 1
DIARRHEA: 0
VOMITING: 0
ABDOMINAL PAIN: 0
NAUSEA: 0
VOICE CHANGE: 0
BACK PAIN: 0
COUGH: 1

## 2019-01-08 LAB — S PYO THROAT QL CULT: NORMAL

## 2019-01-10 ENCOUNTER — OFFICE VISIT (OUTPATIENT)
Dept: INTERNAL MEDICINE CLINIC | Age: 79
End: 2019-01-10
Payer: MEDICARE

## 2019-01-10 VITALS
OXYGEN SATURATION: 95 % | HEIGHT: 62 IN | WEIGHT: 165 LBS | HEART RATE: 129 BPM | TEMPERATURE: 98.3 F | SYSTOLIC BLOOD PRESSURE: 132 MMHG | DIASTOLIC BLOOD PRESSURE: 80 MMHG | BODY MASS INDEX: 30.36 KG/M2 | RESPIRATION RATE: 19 BRPM

## 2019-01-10 DIAGNOSIS — I50.31 ACUTE DIASTOLIC CONGESTIVE HEART FAILURE (HCC): ICD-10-CM

## 2019-01-10 DIAGNOSIS — J40 BRONCHITIS: Primary | ICD-10-CM

## 2019-01-10 DIAGNOSIS — I48.91 ATRIAL FIBRILLATION WITH RVR (HCC): ICD-10-CM

## 2019-01-10 LAB
ANION GAP SERPL CALCULATED.3IONS-SCNC: 22 MMOL/L (ref 3–16)
BASOPHILS ABSOLUTE: 0.1 K/UL (ref 0–0.2)
BASOPHILS RELATIVE PERCENT: 0.4 %
BUN BLDV-MCNC: 25 MG/DL (ref 7–20)
CALCIUM SERPL-MCNC: 10 MG/DL (ref 8.3–10.6)
CHLORIDE BLD-SCNC: 92 MMOL/L (ref 99–110)
CO2: 23 MMOL/L (ref 21–32)
CREAT SERPL-MCNC: 0.8 MG/DL (ref 0.6–1.2)
EOSINOPHILS ABSOLUTE: 0 K/UL (ref 0–0.6)
EOSINOPHILS RELATIVE PERCENT: 0 %
GFR AFRICAN AMERICAN: >60
GFR NON-AFRICAN AMERICAN: >60
GLUCOSE BLD-MCNC: 110 MG/DL (ref 70–99)
HCT VFR BLD CALC: 34.1 % (ref 36–48)
HEMOGLOBIN: 11.1 G/DL (ref 12–16)
LYMPHOCYTES ABSOLUTE: 0.8 K/UL (ref 1–5.1)
LYMPHOCYTES RELATIVE PERCENT: 5.1 %
MCH RBC QN AUTO: 28.8 PG (ref 26–34)
MCHC RBC AUTO-ENTMCNC: 32.6 G/DL (ref 31–36)
MCV RBC AUTO: 88.5 FL (ref 80–100)
MONOCYTES ABSOLUTE: 1.3 K/UL (ref 0–1.3)
MONOCYTES RELATIVE PERCENT: 8 %
NEUTROPHILS ABSOLUTE: 13.8 K/UL (ref 1.7–7.7)
NEUTROPHILS RELATIVE PERCENT: 86.5 %
PDW BLD-RTO: 14.9 % (ref 12.4–15.4)
PLATELET # BLD: 161 K/UL (ref 135–450)
PMV BLD AUTO: 12.2 FL (ref 5–10.5)
POTASSIUM SERPL-SCNC: 3.8 MMOL/L (ref 3.5–5.1)
PRO-BNP: 3185 PG/ML (ref 0–449)
RBC # BLD: 3.86 M/UL (ref 4–5.2)
SODIUM BLD-SCNC: 137 MMOL/L (ref 136–145)
WBC # BLD: 16 K/UL (ref 4–11)

## 2019-01-10 PROCEDURE — G8427 DOCREV CUR MEDS BY ELIG CLIN: HCPCS | Performed by: INTERNAL MEDICINE

## 2019-01-10 PROCEDURE — 36415 COLL VENOUS BLD VENIPUNCTURE: CPT | Performed by: INTERNAL MEDICINE

## 2019-01-10 PROCEDURE — G8484 FLU IMMUNIZE NO ADMIN: HCPCS | Performed by: INTERNAL MEDICINE

## 2019-01-10 PROCEDURE — 1101F PT FALLS ASSESS-DOCD LE1/YR: CPT | Performed by: INTERNAL MEDICINE

## 2019-01-10 PROCEDURE — 99214 OFFICE O/P EST MOD 30 MIN: CPT | Performed by: INTERNAL MEDICINE

## 2019-01-10 PROCEDURE — 1123F ACP DISCUSS/DSCN MKR DOCD: CPT | Performed by: INTERNAL MEDICINE

## 2019-01-10 PROCEDURE — 4040F PNEUMOC VAC/ADMIN/RCVD: CPT | Performed by: INTERNAL MEDICINE

## 2019-01-10 PROCEDURE — 1036F TOBACCO NON-USER: CPT | Performed by: INTERNAL MEDICINE

## 2019-01-10 PROCEDURE — G8598 ASA/ANTIPLAT THER USED: HCPCS | Performed by: INTERNAL MEDICINE

## 2019-01-10 PROCEDURE — G8399 PT W/DXA RESULTS DOCUMENT: HCPCS | Performed by: INTERNAL MEDICINE

## 2019-01-10 PROCEDURE — G8417 CALC BMI ABV UP PARAM F/U: HCPCS | Performed by: INTERNAL MEDICINE

## 2019-01-10 PROCEDURE — 1090F PRES/ABSN URINE INCON ASSESS: CPT | Performed by: INTERNAL MEDICINE

## 2019-01-10 RX ORDER — FUROSEMIDE 40 MG/1
40 TABLET ORAL DAILY
Qty: 30 TABLET | Refills: 5 | Status: SHIPPED | OUTPATIENT
Start: 2019-01-10 | End: 2019-01-21 | Stop reason: SDUPTHER

## 2019-01-10 RX ORDER — GUAIFENESIN AND CODEINE PHOSPHATE 100; 10 MG/5ML; MG/5ML
5 SOLUTION ORAL 4 TIMES DAILY PRN
Qty: 118 ML | Refills: 0 | Status: SHIPPED | OUTPATIENT
Start: 2019-01-10 | End: 2019-01-14 | Stop reason: SDUPTHER

## 2019-01-10 RX ORDER — PREDNISONE 20 MG/1
20 TABLET ORAL DAILY
Qty: 10 TABLET | Refills: 0 | Status: ON HOLD | OUTPATIENT
Start: 2019-01-10 | End: 2019-01-20 | Stop reason: HOSPADM

## 2019-01-10 RX ORDER — DILTIAZEM HYDROCHLORIDE 240 MG/1
240 CAPSULE, COATED, EXTENDED RELEASE ORAL DAILY
Qty: 30 CAPSULE | Refills: 3 | Status: ON HOLD | OUTPATIENT
Start: 2019-01-10 | End: 2019-01-20 | Stop reason: HOSPADM

## 2019-01-10 RX ORDER — DOXYCYCLINE HYCLATE 100 MG
100 TABLET ORAL 2 TIMES DAILY
Qty: 20 TABLET | Refills: 0 | Status: ON HOLD | OUTPATIENT
Start: 2019-01-10 | End: 2019-01-20 | Stop reason: HOSPADM

## 2019-01-10 ASSESSMENT — PATIENT HEALTH QUESTIONNAIRE - PHQ9
2. FEELING DOWN, DEPRESSED OR HOPELESS: 0
1. LITTLE INTEREST OR PLEASURE IN DOING THINGS: 0
SUM OF ALL RESPONSES TO PHQ9 QUESTIONS 1 & 2: 0
SUM OF ALL RESPONSES TO PHQ QUESTIONS 1-9: 0
SUM OF ALL RESPONSES TO PHQ QUESTIONS 1-9: 0

## 2019-01-14 DIAGNOSIS — J40 BRONCHITIS: ICD-10-CM

## 2019-01-14 RX ORDER — CODEINE PHOSPHATE AND GUAIFENESIN 10; 100 MG/5ML; MG/5ML
5 SOLUTION ORAL 4 TIMES DAILY PRN
Qty: 118 ML | Refills: 0 | Status: SHIPPED | OUTPATIENT
Start: 2019-01-14 | End: 2019-01-21

## 2019-01-15 ENCOUNTER — ANESTHESIA EVENT (OUTPATIENT)
Dept: ENDOSCOPY | Age: 79
DRG: 871 | End: 2019-01-15
Payer: MEDICARE

## 2019-01-15 ENCOUNTER — HOSPITAL ENCOUNTER (INPATIENT)
Age: 79
LOS: 5 days | Discharge: HOME | DRG: 871 | End: 2019-01-20
Attending: EMERGENCY MEDICINE | Admitting: INTERNAL MEDICINE
Payer: MEDICARE

## 2019-01-15 ENCOUNTER — APPOINTMENT (OUTPATIENT)
Dept: GENERAL RADIOLOGY | Age: 79
DRG: 871 | End: 2019-01-15
Payer: MEDICARE

## 2019-01-15 DIAGNOSIS — I48.91 ATRIAL FIBRILLATION WITH RAPID VENTRICULAR RESPONSE (HCC): ICD-10-CM

## 2019-01-15 DIAGNOSIS — J18.9 PNEUMONIA DUE TO ORGANISM: ICD-10-CM

## 2019-01-15 DIAGNOSIS — I48.91 ATRIAL FIBRILLATION WITH RVR (HCC): ICD-10-CM

## 2019-01-15 DIAGNOSIS — D64.9 ANEMIA, UNSPECIFIED TYPE: ICD-10-CM

## 2019-01-15 DIAGNOSIS — K92.2 ACUTE GI BLEEDING: Primary | ICD-10-CM

## 2019-01-15 PROBLEM — R65.10 SIRS (SYSTEMIC INFLAMMATORY RESPONSE SYNDROME) (HCC): Status: ACTIVE | Noted: 2019-01-15

## 2019-01-15 PROBLEM — D62 ACUTE BLOOD LOSS ANEMIA: Status: ACTIVE | Noted: 2019-01-15

## 2019-01-15 LAB
A/G RATIO: 1.1 (ref 1.1–2.2)
ABO/RH: NORMAL
ALBUMIN SERPL-MCNC: 3.3 G/DL (ref 3.4–5)
ALP BLD-CCNC: 193 U/L (ref 40–129)
ALT SERPL-CCNC: 96 U/L (ref 10–40)
ANION GAP SERPL CALCULATED.3IONS-SCNC: 15 MMOL/L (ref 3–16)
ANISOCYTOSIS: ABNORMAL
ANTIBODY SCREEN: NORMAL
APTT: 29 SEC (ref 26–36)
AST SERPL-CCNC: 41 U/L (ref 15–37)
BACTERIA: ABNORMAL /HPF
BANDED NEUTROPHILS RELATIVE PERCENT: 6 % (ref 0–7)
BASOPHILS ABSOLUTE: 0.2 K/UL (ref 0–0.2)
BASOPHILS RELATIVE PERCENT: 1 %
BILIRUB SERPL-MCNC: 0.3 MG/DL (ref 0–1)
BILIRUBIN URINE: NEGATIVE
BLOOD BANK DISPENSE STATUS: NORMAL
BLOOD BANK DISPENSE STATUS: NORMAL
BLOOD BANK PRODUCT CODE: NORMAL
BLOOD BANK PRODUCT CODE: NORMAL
BLOOD, URINE: NEGATIVE
BPU ID: NORMAL
BPU ID: NORMAL
BUN BLDV-MCNC: 63 MG/DL (ref 7–20)
CALCIUM SERPL-MCNC: 9.3 MG/DL (ref 8.3–10.6)
CHLORIDE BLD-SCNC: 97 MMOL/L (ref 99–110)
CLARITY: CLEAR
CO2: 28 MMOL/L (ref 21–32)
COLOR: YELLOW
CREAT SERPL-MCNC: 0.9 MG/DL (ref 0.6–1.2)
DESCRIPTION BLOOD BANK: NORMAL
DESCRIPTION BLOOD BANK: NORMAL
EOSINOPHILS ABSOLUTE: 0.2 K/UL (ref 0–0.6)
EOSINOPHILS RELATIVE PERCENT: 1 %
EPITHELIAL CELLS, UA: ABNORMAL /HPF
GFR AFRICAN AMERICAN: >60
GFR NON-AFRICAN AMERICAN: >60
GLOBULIN: 3 G/DL
GLUCOSE BLD-MCNC: 176 MG/DL (ref 70–99)
GLUCOSE URINE: NEGATIVE MG/DL
HCT VFR BLD CALC: 21 % (ref 36–48)
HCT VFR BLD CALC: 24 % (ref 36–48)
HEMOGLOBIN: 6.7 G/DL (ref 12–16)
HEMOGLOBIN: 7.9 G/DL (ref 12–16)
INR BLD: 1.69 (ref 0.86–1.14)
KETONES, URINE: NEGATIVE MG/DL
LEUKOCYTE ESTERASE, URINE: ABNORMAL
LYMPHOCYTES ABSOLUTE: 1.7 K/UL (ref 1–5.1)
LYMPHOCYTES RELATIVE PERCENT: 7 %
MCH RBC QN AUTO: 28.4 PG (ref 26–34)
MCHC RBC AUTO-ENTMCNC: 32.8 G/DL (ref 31–36)
MCV RBC AUTO: 86.6 FL (ref 80–100)
MICROSCOPIC EXAMINATION: YES
MONOCYTES ABSOLUTE: 0.5 K/UL (ref 0–1.3)
MONOCYTES RELATIVE PERCENT: 2 %
NEUTROPHILS ABSOLUTE: 21.2 K/UL (ref 1.7–7.7)
NEUTROPHILS RELATIVE PERCENT: 83 %
NITRITE, URINE: NEGATIVE
OCCULT BLOOD DIAGNOSTIC: ABNORMAL
PDW BLD-RTO: 14.3 % (ref 12.4–15.4)
PH UA: 5.5
PLATELET # BLD: 273 K/UL (ref 135–450)
PMV BLD AUTO: 10.4 FL (ref 5–10.5)
POTASSIUM SERPL-SCNC: 4 MMOL/L (ref 3.5–5.1)
PROCALCITONIN: 0.09 NG/ML (ref 0–0.15)
PROTEIN UA: NEGATIVE MG/DL
PROTHROMBIN TIME: 19.3 SEC (ref 9.8–13)
RBC # BLD: 2.77 M/UL (ref 4–5.2)
RBC UA: ABNORMAL /HPF (ref 0–2)
SODIUM BLD-SCNC: 140 MMOL/L (ref 136–145)
SPECIFIC GRAVITY UA: 1.01
TOTAL PROTEIN: 6.3 G/DL (ref 6.4–8.2)
TOXIC GRANULATION: PRESENT
TROPONIN: <0.01 NG/ML
URINE REFLEX TO CULTURE: YES
URINE TYPE: ABNORMAL
UROBILINOGEN, URINE: 0.2 E.U./DL
WBC # BLD: 23.8 K/UL (ref 4–11)
WBC UA: ABNORMAL /HPF (ref 0–5)

## 2019-01-15 PROCEDURE — 99285 EMERGENCY DEPT VISIT HI MDM: CPT

## 2019-01-15 PROCEDURE — 85730 THROMBOPLASTIN TIME PARTIAL: CPT

## 2019-01-15 PROCEDURE — 2060000000 HC ICU INTERMEDIATE R&B

## 2019-01-15 PROCEDURE — 86923 COMPATIBILITY TEST ELECTRIC: CPT

## 2019-01-15 PROCEDURE — 2580000003 HC RX 258: Performed by: EMERGENCY MEDICINE

## 2019-01-15 PROCEDURE — 93005 ELECTROCARDIOGRAM TRACING: CPT | Performed by: EMERGENCY MEDICINE

## 2019-01-15 PROCEDURE — 81001 URINALYSIS AUTO W/SCOPE: CPT

## 2019-01-15 PROCEDURE — 36430 TRANSFUSION BLD/BLD COMPNT: CPT

## 2019-01-15 PROCEDURE — 87086 URINE CULTURE/COLONY COUNT: CPT

## 2019-01-15 PROCEDURE — 96367 TX/PROPH/DG ADDL SEQ IV INF: CPT

## 2019-01-15 PROCEDURE — 80053 COMPREHEN METABOLIC PANEL: CPT

## 2019-01-15 PROCEDURE — 6370000000 HC RX 637 (ALT 250 FOR IP): Performed by: INTERNAL MEDICINE

## 2019-01-15 PROCEDURE — 86850 RBC ANTIBODY SCREEN: CPT

## 2019-01-15 PROCEDURE — C9113 INJ PANTOPRAZOLE SODIUM, VIA: HCPCS | Performed by: EMERGENCY MEDICINE

## 2019-01-15 PROCEDURE — 96365 THER/PROPH/DIAG IV INF INIT: CPT

## 2019-01-15 PROCEDURE — 36415 COLL VENOUS BLD VENIPUNCTURE: CPT

## 2019-01-15 PROCEDURE — 6360000002 HC RX W HCPCS: Performed by: EMERGENCY MEDICINE

## 2019-01-15 PROCEDURE — 85014 HEMATOCRIT: CPT

## 2019-01-15 PROCEDURE — G0328 FECAL BLOOD SCRN IMMUNOASSAY: HCPCS

## 2019-01-15 PROCEDURE — 6370000000 HC RX 637 (ALT 250 FOR IP): Performed by: EMERGENCY MEDICINE

## 2019-01-15 PROCEDURE — 84484 ASSAY OF TROPONIN QUANT: CPT

## 2019-01-15 PROCEDURE — 85025 COMPLETE CBC W/AUTO DIFF WBC: CPT

## 2019-01-15 PROCEDURE — P9016 RBC LEUKOCYTES REDUCED: HCPCS

## 2019-01-15 PROCEDURE — 85610 PROTHROMBIN TIME: CPT

## 2019-01-15 PROCEDURE — 96376 TX/PRO/DX INJ SAME DRUG ADON: CPT

## 2019-01-15 PROCEDURE — 2580000003 HC RX 258: Performed by: INTERNAL MEDICINE

## 2019-01-15 PROCEDURE — 84145 PROCALCITONIN (PCT): CPT

## 2019-01-15 PROCEDURE — 86900 BLOOD TYPING SEROLOGIC ABO: CPT

## 2019-01-15 PROCEDURE — 85018 HEMOGLOBIN: CPT

## 2019-01-15 PROCEDURE — 71046 X-RAY EXAM CHEST 2 VIEWS: CPT

## 2019-01-15 PROCEDURE — 86901 BLOOD TYPING SEROLOGIC RH(D): CPT

## 2019-01-15 PROCEDURE — 6370000000 HC RX 637 (ALT 250 FOR IP): Performed by: PHYSICIAN ASSISTANT

## 2019-01-15 RX ORDER — AMITRIPTYLINE HYDROCHLORIDE 25 MG/1
25 TABLET, FILM COATED ORAL NIGHTLY
Status: DISCONTINUED | OUTPATIENT
Start: 2019-01-15 | End: 2019-01-20 | Stop reason: HOSPADM

## 2019-01-15 RX ORDER — CALCIUM CARBONATE/VITAMIN D3 250-3.125
250 TABLET ORAL 2 TIMES DAILY WITH MEALS
Status: DISCONTINUED | OUTPATIENT
Start: 2019-01-15 | End: 2019-01-20 | Stop reason: HOSPADM

## 2019-01-15 RX ORDER — 0.9 % SODIUM CHLORIDE 0.9 %
500 INTRAVENOUS SOLUTION INTRAVENOUS ONCE
Status: COMPLETED | OUTPATIENT
Start: 2019-01-15 | End: 2019-01-15

## 2019-01-15 RX ORDER — 0.9 % SODIUM CHLORIDE 0.9 %
250 INTRAVENOUS SOLUTION INTRAVENOUS ONCE
Status: COMPLETED | OUTPATIENT
Start: 2019-01-15 | End: 2019-01-16

## 2019-01-15 RX ORDER — PREDNISONE 20 MG/1
20 TABLET ORAL DAILY
Status: DISCONTINUED | OUTPATIENT
Start: 2019-01-16 | End: 2019-01-20 | Stop reason: HOSPADM

## 2019-01-15 RX ORDER — ATORVASTATIN CALCIUM 20 MG/1
20 TABLET, FILM COATED ORAL DAILY
Status: DISCONTINUED | OUTPATIENT
Start: 2019-01-15 | End: 2019-01-20 | Stop reason: HOSPADM

## 2019-01-15 RX ORDER — ACETAMINOPHEN 325 MG/1
650 TABLET ORAL EVERY 4 HOURS PRN
Status: DISCONTINUED | OUTPATIENT
Start: 2019-01-15 | End: 2019-01-20 | Stop reason: HOSPADM

## 2019-01-15 RX ORDER — PANTOPRAZOLE SODIUM 40 MG/10ML
40 INJECTION, POWDER, LYOPHILIZED, FOR SOLUTION INTRAVENOUS ONCE
Status: DISCONTINUED | OUTPATIENT
Start: 2019-01-15 | End: 2019-01-15 | Stop reason: ALTCHOICE

## 2019-01-15 RX ORDER — SODIUM CHLORIDE 0.9 % (FLUSH) 0.9 %
10 SYRINGE (ML) INJECTION PRN
Status: DISCONTINUED | OUTPATIENT
Start: 2019-01-15 | End: 2019-01-20 | Stop reason: HOSPADM

## 2019-01-15 RX ORDER — ALBUTEROL SULFATE 90 UG/1
2 AEROSOL, METERED RESPIRATORY (INHALATION) EVERY 4 HOURS PRN
Status: DISCONTINUED | OUTPATIENT
Start: 2019-01-15 | End: 2019-01-20 | Stop reason: HOSPADM

## 2019-01-15 RX ORDER — OMEGA-3/DHA/EPA/FISH OIL 300-1000MG
2 CAPSULE ORAL DAILY
Status: DISCONTINUED | OUTPATIENT
Start: 2019-01-15 | End: 2019-01-15 | Stop reason: RX

## 2019-01-15 RX ORDER — PROMETHAZINE HYDROCHLORIDE AND CODEINE PHOSPHATE 6.25; 1 MG/5ML; MG/5ML
5 SOLUTION ORAL ONCE
Status: COMPLETED | OUTPATIENT
Start: 2019-01-15 | End: 2019-01-15

## 2019-01-15 RX ORDER — MULTIVITAMIN WITH FOLIC ACID 400 MCG
1 TABLET ORAL DAILY
Status: DISCONTINUED | OUTPATIENT
Start: 2019-01-15 | End: 2019-01-15

## 2019-01-15 RX ORDER — PANTOPRAZOLE SODIUM 40 MG/10ML
40 INJECTION, POWDER, LYOPHILIZED, FOR SOLUTION INTRAVENOUS ONCE
Status: COMPLETED | OUTPATIENT
Start: 2019-01-15 | End: 2019-01-15

## 2019-01-15 RX ORDER — SODIUM CHLORIDE 9 MG/ML
INJECTION, SOLUTION INTRAVENOUS CONTINUOUS
Status: DISCONTINUED | OUTPATIENT
Start: 2019-01-15 | End: 2019-01-18

## 2019-01-15 RX ORDER — CODEINE PHOSPHATE AND GUAIFENESIN 10; 100 MG/5ML; MG/5ML
5 SOLUTION ORAL 4 TIMES DAILY PRN
Status: DISCONTINUED | OUTPATIENT
Start: 2019-01-15 | End: 2019-01-20 | Stop reason: HOSPADM

## 2019-01-15 RX ORDER — MULTIVITAMIN WITH FOLIC ACID 400 MCG
1 TABLET ORAL DAILY
Status: DISCONTINUED | OUTPATIENT
Start: 2019-01-16 | End: 2019-01-20 | Stop reason: HOSPADM

## 2019-01-15 RX ORDER — SODIUM CHLORIDE 0.9 % (FLUSH) 0.9 %
10 SYRINGE (ML) INJECTION EVERY 12 HOURS SCHEDULED
Status: DISCONTINUED | OUTPATIENT
Start: 2019-01-15 | End: 2019-01-20 | Stop reason: HOSPADM

## 2019-01-15 RX ORDER — DILTIAZEM HYDROCHLORIDE 240 MG/1
240 CAPSULE, COATED, EXTENDED RELEASE ORAL DAILY
Status: DISCONTINUED | OUTPATIENT
Start: 2019-01-15 | End: 2019-01-19

## 2019-01-15 RX ORDER — ONDANSETRON 2 MG/ML
4 INJECTION INTRAMUSCULAR; INTRAVENOUS EVERY 6 HOURS PRN
Status: DISCONTINUED | OUTPATIENT
Start: 2019-01-15 | End: 2019-01-20 | Stop reason: HOSPADM

## 2019-01-15 RX ADMIN — SODIUM CHLORIDE 500 ML: 9 INJECTION, SOLUTION INTRAVENOUS at 07:58

## 2019-01-15 RX ADMIN — SODIUM CHLORIDE 250 ML: 9 INJECTION, SOLUTION INTRAVENOUS at 18:42

## 2019-01-15 RX ADMIN — SODIUM CHLORIDE 80 MG: 900 INJECTION INTRAVENOUS at 09:11

## 2019-01-15 RX ADMIN — Medication 10 ML: at 20:47

## 2019-01-15 RX ADMIN — Medication 5 ML: at 10:06

## 2019-01-15 RX ADMIN — SODIUM CHLORIDE 8 MG/HR: 0.9 INJECTION INTRAVENOUS at 22:33

## 2019-01-15 RX ADMIN — SODIUM CHLORIDE 500 ML: 9 INJECTION, SOLUTION INTRAVENOUS at 12:46

## 2019-01-15 RX ADMIN — CEFTRIAXONE 1 G: 1 INJECTION, POWDER, FOR SOLUTION INTRAMUSCULAR; INTRAVENOUS at 08:13

## 2019-01-15 RX ADMIN — PANTOPRAZOLE SODIUM 40 MG: 40 INJECTION, POWDER, FOR SOLUTION INTRAVENOUS at 07:58

## 2019-01-15 RX ADMIN — GUAIFENESIN AND CODEINE PHOSPHATE 5 ML: 10; 100 LIQUID ORAL at 19:15

## 2019-01-15 RX ADMIN — BENZOCAINE, MENTHOL 1 LOZENGE: 15; 3.6 LOZENGE ORAL at 16:48

## 2019-01-15 RX ADMIN — CALCIUM CARBONATE-CHOLECALCIFEROL TAB 250 MG-125 UNIT 250 MG: 250-125 TAB at 16:48

## 2019-01-15 RX ADMIN — SODIUM CHLORIDE: 9 INJECTION, SOLUTION INTRAVENOUS at 14:18

## 2019-01-15 RX ADMIN — AMITRIPTYLINE HYDROCHLORIDE 25 MG: 25 TABLET, FILM COATED ORAL at 20:45

## 2019-01-15 RX ADMIN — AZITHROMYCIN MONOHYDRATE 500 MG: 500 INJECTION, POWDER, LYOPHILIZED, FOR SOLUTION INTRAVENOUS at 08:49

## 2019-01-15 RX ADMIN — SODIUM CHLORIDE 8 MG/HR: 0.9 INJECTION INTRAVENOUS at 09:53

## 2019-01-15 ASSESSMENT — ENCOUNTER SYMPTOMS
DIARRHEA: 0
COUGH: 1
SHORTNESS OF BREATH: 1
ABDOMINAL PAIN: 0
CONSTIPATION: 0
EYES NEGATIVE: 1
BLOOD IN STOOL: 1
NAUSEA: 0

## 2019-01-15 ASSESSMENT — PAIN SCALES - GENERAL
PAINLEVEL_OUTOF10: 0
PAINLEVEL_OUTOF10: 0

## 2019-01-16 ENCOUNTER — ANESTHESIA (OUTPATIENT)
Dept: ENDOSCOPY | Age: 79
DRG: 871 | End: 2019-01-16
Payer: MEDICARE

## 2019-01-16 VITALS
OXYGEN SATURATION: 96 % | DIASTOLIC BLOOD PRESSURE: 64 MMHG | RESPIRATION RATE: 24 BRPM | SYSTOLIC BLOOD PRESSURE: 97 MMHG

## 2019-01-16 LAB
A/G RATIO: 1.4 (ref 1.1–2.2)
ALBUMIN SERPL-MCNC: 2.8 G/DL (ref 3.4–5)
ALP BLD-CCNC: 138 U/L (ref 40–129)
ALT SERPL-CCNC: 63 U/L (ref 10–40)
ANION GAP SERPL CALCULATED.3IONS-SCNC: 16 MMOL/L (ref 3–16)
AST SERPL-CCNC: 22 U/L (ref 15–37)
BANDED NEUTROPHILS RELATIVE PERCENT: 1 % (ref 0–7)
BASOPHILS ABSOLUTE: 0 K/UL (ref 0–0.2)
BASOPHILS RELATIVE PERCENT: 0 %
BILIRUB SERPL-MCNC: 0.5 MG/DL (ref 0–1)
BUN BLDV-MCNC: 33 MG/DL (ref 7–20)
CALCIUM SERPL-MCNC: 8.1 MG/DL (ref 8.3–10.6)
CHLORIDE BLD-SCNC: 103 MMOL/L (ref 99–110)
CO2: 22 MMOL/L (ref 21–32)
CREAT SERPL-MCNC: 0.7 MG/DL (ref 0.6–1.2)
EKG ATRIAL RATE: 163 BPM
EKG DIAGNOSIS: NORMAL
EKG Q-T INTERVAL: 308 MS
EKG QRS DURATION: 80 MS
EKG QTC CALCULATION (BAZETT): 446 MS
EKG R AXIS: 26 DEGREES
EKG T AXIS: 74 DEGREES
EKG VENTRICULAR RATE: 126 BPM
EOSINOPHILS ABSOLUTE: 0 K/UL (ref 0–0.6)
EOSINOPHILS RELATIVE PERCENT: 0 %
GFR AFRICAN AMERICAN: >60
GFR NON-AFRICAN AMERICAN: >60
GLOBULIN: 2 G/DL
GLUCOSE BLD-MCNC: 98 MG/DL (ref 70–99)
HCT VFR BLD CALC: 26.2 % (ref 36–48)
HCT VFR BLD CALC: 27.4 % (ref 36–48)
HCT VFR BLD CALC: 27.6 % (ref 36–48)
HEMOGLOBIN: 8.8 G/DL (ref 12–16)
HEMOGLOBIN: 9.1 G/DL (ref 12–16)
HEMOGLOBIN: 9.2 G/DL (ref 12–16)
LYMPHOCYTES ABSOLUTE: 3.8 K/UL (ref 1–5.1)
LYMPHOCYTES RELATIVE PERCENT: 23 %
MCH RBC QN AUTO: 29.4 PG (ref 26–34)
MCHC RBC AUTO-ENTMCNC: 33.7 G/DL (ref 31–36)
MCV RBC AUTO: 87.1 FL (ref 80–100)
MONOCYTES ABSOLUTE: 0.7 K/UL (ref 0–1.3)
MONOCYTES RELATIVE PERCENT: 4 %
MYELOCYTE PERCENT: 1 %
NEUTROPHILS ABSOLUTE: 12 K/UL (ref 1.7–7.7)
NEUTROPHILS RELATIVE PERCENT: 71 %
PDW BLD-RTO: 14.3 % (ref 12.4–15.4)
PLATELET # BLD: 201 K/UL (ref 135–450)
PMV BLD AUTO: 10.2 FL (ref 5–10.5)
POTASSIUM REFLEX MAGNESIUM: 4.1 MMOL/L (ref 3.5–5.1)
RBC # BLD: 3.01 M/UL (ref 4–5.2)
RBC # BLD: NORMAL 10*6/UL
SODIUM BLD-SCNC: 141 MMOL/L (ref 136–145)
TOTAL PROTEIN: 4.8 G/DL (ref 6.4–8.2)
TOXIC GRANULATION: PRESENT
URINE CULTURE, ROUTINE: NORMAL
WBC # BLD: 16.4 K/UL (ref 4–11)

## 2019-01-16 PROCEDURE — 3609017100 HC EGD: Performed by: INTERNAL MEDICINE

## 2019-01-16 PROCEDURE — 93010 ELECTROCARDIOGRAM REPORT: CPT | Performed by: INTERNAL MEDICINE

## 2019-01-16 PROCEDURE — 7100000000 HC PACU RECOVERY - FIRST 15 MIN: Performed by: INTERNAL MEDICINE

## 2019-01-16 PROCEDURE — S0028 INJECTION, FAMOTIDINE, 20 MG: HCPCS | Performed by: ANESTHESIOLOGY

## 2019-01-16 PROCEDURE — 1200000000 HC SEMI PRIVATE

## 2019-01-16 PROCEDURE — 3700000000 HC ANESTHESIA ATTENDED CARE: Performed by: INTERNAL MEDICINE

## 2019-01-16 PROCEDURE — 2580000003 HC RX 258: Performed by: INTERNAL MEDICINE

## 2019-01-16 PROCEDURE — 2709999900 HC NON-CHARGEABLE SUPPLY: Performed by: INTERNAL MEDICINE

## 2019-01-16 PROCEDURE — C9113 INJ PANTOPRAZOLE SODIUM, VIA: HCPCS | Performed by: INTERNAL MEDICINE

## 2019-01-16 PROCEDURE — 85014 HEMATOCRIT: CPT

## 2019-01-16 PROCEDURE — 85018 HEMOGLOBIN: CPT

## 2019-01-16 PROCEDURE — 2500000003 HC RX 250 WO HCPCS: Performed by: NURSE ANESTHETIST, CERTIFIED REGISTERED

## 2019-01-16 PROCEDURE — 2580000003 HC RX 258: Performed by: ANESTHESIOLOGY

## 2019-01-16 PROCEDURE — 6370000000 HC RX 637 (ALT 250 FOR IP): Performed by: INTERNAL MEDICINE

## 2019-01-16 PROCEDURE — 6370000000 HC RX 637 (ALT 250 FOR IP): Performed by: PHYSICIAN ASSISTANT

## 2019-01-16 PROCEDURE — 6360000002 HC RX W HCPCS: Performed by: INTERNAL MEDICINE

## 2019-01-16 PROCEDURE — 0DJ08ZZ INSPECTION OF UPPER INTESTINAL TRACT, VIA NATURAL OR ARTIFICIAL OPENING ENDOSCOPIC: ICD-10-PCS | Performed by: INTERNAL MEDICINE

## 2019-01-16 PROCEDURE — 2500000003 HC RX 250 WO HCPCS: Performed by: INTERNAL MEDICINE

## 2019-01-16 PROCEDURE — 6360000002 HC RX W HCPCS: Performed by: ANESTHESIOLOGY

## 2019-01-16 PROCEDURE — 80053 COMPREHEN METABOLIC PANEL: CPT

## 2019-01-16 PROCEDURE — 85025 COMPLETE CBC W/AUTO DIFF WBC: CPT

## 2019-01-16 PROCEDURE — 36415 COLL VENOUS BLD VENIPUNCTURE: CPT

## 2019-01-16 PROCEDURE — 94760 N-INVAS EAR/PLS OXIMETRY 1: CPT

## 2019-01-16 PROCEDURE — 6360000002 HC RX W HCPCS: Performed by: NURSE ANESTHETIST, CERTIFIED REGISTERED

## 2019-01-16 PROCEDURE — 3700000001 HC ADD 15 MINUTES (ANESTHESIA): Performed by: INTERNAL MEDICINE

## 2019-01-16 PROCEDURE — 7100000001 HC PACU RECOVERY - ADDTL 15 MIN: Performed by: INTERNAL MEDICINE

## 2019-01-16 RX ORDER — SODIUM CHLORIDE 0.9 % (FLUSH) 0.9 %
10 SYRINGE (ML) INJECTION EVERY 12 HOURS SCHEDULED
Status: DISCONTINUED | OUTPATIENT
Start: 2019-01-16 | End: 2019-01-16

## 2019-01-16 RX ORDER — PROPOFOL 10 MG/ML
INJECTION, EMULSION INTRAVENOUS PRN
Status: DISCONTINUED | OUTPATIENT
Start: 2019-01-16 | End: 2019-01-16 | Stop reason: SDUPTHER

## 2019-01-16 RX ORDER — SODIUM CHLORIDE 0.9 % (FLUSH) 0.9 %
10 SYRINGE (ML) INJECTION PRN
Status: DISCONTINUED | OUTPATIENT
Start: 2019-01-16 | End: 2019-01-16

## 2019-01-16 RX ORDER — SODIUM CHLORIDE 9 MG/ML
INJECTION, SOLUTION INTRAVENOUS CONTINUOUS
Status: DISCONTINUED | OUTPATIENT
Start: 2019-01-16 | End: 2019-01-16

## 2019-01-16 RX ORDER — UBIDECARENONE 10 MG
CAPSULE ORAL DAILY
COMMUNITY
End: 2019-09-11 | Stop reason: HOSPADM

## 2019-01-16 RX ORDER — PANTOPRAZOLE SODIUM 40 MG/10ML
40 INJECTION, POWDER, LYOPHILIZED, FOR SOLUTION INTRAVENOUS 2 TIMES DAILY
Status: DISCONTINUED | OUTPATIENT
Start: 2019-01-16 | End: 2019-01-18

## 2019-01-16 RX ORDER — 0.9 % SODIUM CHLORIDE 0.9 %
500 INTRAVENOUS SOLUTION INTRAVENOUS ONCE
Status: COMPLETED | OUTPATIENT
Start: 2019-01-16 | End: 2019-01-16

## 2019-01-16 RX ORDER — LIDOCAINE HYDROCHLORIDE 20 MG/ML
INJECTION, SOLUTION INFILTRATION; PERINEURAL PRN
Status: DISCONTINUED | OUTPATIENT
Start: 2019-01-16 | End: 2019-01-16 | Stop reason: SDUPTHER

## 2019-01-16 RX ORDER — 0.9 % SODIUM CHLORIDE 0.9 %
10 VIAL (ML) INJECTION 2 TIMES DAILY
Status: DISCONTINUED | OUTPATIENT
Start: 2019-01-16 | End: 2019-01-18

## 2019-01-16 RX ADMIN — GUAIFENESIN AND CODEINE PHOSPHATE 5 ML: 10; 100 LIQUID ORAL at 23:50

## 2019-01-16 RX ADMIN — SODIUM CHLORIDE: 9 INJECTION, SOLUTION INTRAVENOUS at 18:36

## 2019-01-16 RX ADMIN — CEFTRIAXONE 1 G: 1 INJECTION, POWDER, FOR SOLUTION INTRAMUSCULAR; INTRAVENOUS at 08:37

## 2019-01-16 RX ADMIN — PANTOPRAZOLE SODIUM 40 MG: 40 INJECTION, POWDER, FOR SOLUTION INTRAVENOUS at 21:46

## 2019-01-16 RX ADMIN — CALCIUM CARBONATE-CHOLECALCIFEROL TAB 250 MG-125 UNIT 250 MG: 250-125 TAB at 15:27

## 2019-01-16 RX ADMIN — GUAIFENESIN AND CODEINE PHOSPHATE 5 ML: 10; 100 LIQUID ORAL at 08:36

## 2019-01-16 RX ADMIN — GUAIFENESIN AND CODEINE PHOSPHATE 5 ML: 10; 100 LIQUID ORAL at 17:11

## 2019-01-16 RX ADMIN — GUAIFENESIN AND CODEINE PHOSPHATE 5 ML: 10; 100 LIQUID ORAL at 01:46

## 2019-01-16 RX ADMIN — CALCIUM CARBONATE-CHOLECALCIFEROL TAB 250 MG-125 UNIT 250 MG: 250-125 TAB at 08:36

## 2019-01-16 RX ADMIN — SODIUM CHLORIDE 500 ML: 9 INJECTION, SOLUTION INTRAVENOUS at 18:35

## 2019-01-16 RX ADMIN — DILTIAZEM HYDROCHLORIDE 30 MG: 30 TABLET, FILM COATED ORAL at 17:09

## 2019-01-16 RX ADMIN — ATORVASTATIN CALCIUM 20 MG: 20 TABLET, FILM COATED ORAL at 08:36

## 2019-01-16 RX ADMIN — PANTOPRAZOLE SODIUM 40 MG: 40 INJECTION, POWDER, FOR SOLUTION INTRAVENOUS at 15:27

## 2019-01-16 RX ADMIN — SODIUM CHLORIDE: 9 INJECTION, SOLUTION INTRAVENOUS at 01:40

## 2019-01-16 RX ADMIN — AZITHROMYCIN MONOHYDRATE 500 MG: 500 INJECTION, POWDER, LYOPHILIZED, FOR SOLUTION INTRAVENOUS at 09:18

## 2019-01-16 RX ADMIN — PROPOFOL 150 MG: 10 INJECTION, EMULSION INTRAVENOUS at 13:05

## 2019-01-16 RX ADMIN — PROPOFOL 50 MG: 10 INJECTION, EMULSION INTRAVENOUS at 13:00

## 2019-01-16 RX ADMIN — BENZOCAINE, MENTHOL 1 LOZENGE: 15; 3.6 LOZENGE ORAL at 21:46

## 2019-01-16 RX ADMIN — DILTIAZEM HYDROCHLORIDE 240 MG: 240 CAPSULE, COATED, EXTENDED RELEASE ORAL at 08:36

## 2019-01-16 RX ADMIN — AMITRIPTYLINE HYDROCHLORIDE 25 MG: 25 TABLET, FILM COATED ORAL at 21:46

## 2019-01-16 RX ADMIN — Medication 10 ML: at 08:37

## 2019-01-16 RX ADMIN — Medication 10 ML: at 21:46

## 2019-01-16 RX ADMIN — THERA TABS 1 TABLET: TAB at 08:36

## 2019-01-16 RX ADMIN — LIDOCAINE HYDROCHLORIDE 60 MG: 20 INJECTION, SOLUTION INFILTRATION; PERINEURAL at 12:50

## 2019-01-16 RX ADMIN — PROPOFOL 100 MG: 10 INJECTION, EMULSION INTRAVENOUS at 12:45

## 2019-01-16 RX ADMIN — SODIUM CHLORIDE: 9 INJECTION, SOLUTION INTRAVENOUS at 12:50

## 2019-01-16 RX ADMIN — PREDNISONE 20 MG: 20 TABLET ORAL at 08:36

## 2019-01-16 RX ADMIN — DILTIAZEM HYDROCHLORIDE 15 MG/HR: 5 INJECTION INTRAVENOUS at 18:45

## 2019-01-16 RX ADMIN — BENZOCAINE, MENTHOL 1 LOZENGE: 15; 3.6 LOZENGE ORAL at 14:22

## 2019-01-16 RX ADMIN — GUAIFENESIN AND CODEINE PHOSPHATE 5 ML: 10; 100 LIQUID ORAL at 14:22

## 2019-01-16 RX ADMIN — FAMOTIDINE 20 MG: 10 INJECTION, SOLUTION INTRAVENOUS at 11:54

## 2019-01-16 ASSESSMENT — PULMONARY FUNCTION TESTS
PIF_VALUE: 0

## 2019-01-16 ASSESSMENT — ENCOUNTER SYMPTOMS
COUGH: 1
DIARRHEA: 0
SHORTNESS OF BREATH: 1
SORE THROAT: 1
NAUSEA: 1
WHEEZING: 1
VOMITING: 1
ABDOMINAL PAIN: 0
SHORTNESS OF BREATH: 1

## 2019-01-16 ASSESSMENT — PAIN SCALES - GENERAL
PAINLEVEL_OUTOF10: 0

## 2019-01-16 ASSESSMENT — PAIN SCALES - WONG BAKER
WONGBAKER_NUMERICALRESPONSE: 0

## 2019-01-16 ASSESSMENT — PAIN - FUNCTIONAL ASSESSMENT: PAIN_FUNCTIONAL_ASSESSMENT: 0-10

## 2019-01-17 ENCOUNTER — APPOINTMENT (OUTPATIENT)
Dept: GENERAL RADIOLOGY | Age: 79
DRG: 871 | End: 2019-01-17
Payer: MEDICARE

## 2019-01-17 LAB
A/G RATIO: 1.5 (ref 1.1–2.2)
ALBUMIN SERPL-MCNC: 3.2 G/DL (ref 3.4–5)
ALP BLD-CCNC: 124 U/L (ref 40–129)
ALT SERPL-CCNC: 49 U/L (ref 10–40)
ANION GAP SERPL CALCULATED.3IONS-SCNC: 12 MMOL/L (ref 3–16)
AST SERPL-CCNC: 18 U/L (ref 15–37)
BASOPHILS ABSOLUTE: 0 K/UL (ref 0–0.2)
BASOPHILS RELATIVE PERCENT: 0 %
BILIRUB SERPL-MCNC: 0.3 MG/DL (ref 0–1)
BUN BLDV-MCNC: 20 MG/DL (ref 7–20)
CALCIUM SERPL-MCNC: 8.4 MG/DL (ref 8.3–10.6)
CHLORIDE BLD-SCNC: 104 MMOL/L (ref 99–110)
CO2: 22 MMOL/L (ref 21–32)
CREAT SERPL-MCNC: 0.8 MG/DL (ref 0.6–1.2)
EOSINOPHILS ABSOLUTE: 0.1 K/UL (ref 0–0.6)
EOSINOPHILS RELATIVE PERCENT: 1 %
GFR AFRICAN AMERICAN: >60
GFR NON-AFRICAN AMERICAN: >60
GLOBULIN: 2.1 G/DL
GLUCOSE BLD-MCNC: 113 MG/DL (ref 70–99)
HCT VFR BLD CALC: 26.2 % (ref 36–48)
HCT VFR BLD CALC: 27.4 % (ref 36–48)
HCT VFR BLD CALC: 29.2 % (ref 36–48)
HEMOGLOBIN: 8.5 G/DL (ref 12–16)
HEMOGLOBIN: 9 G/DL (ref 12–16)
HEMOGLOBIN: 9.7 G/DL (ref 12–16)
LYMPHOCYTES ABSOLUTE: 2.9 K/UL (ref 1–5.1)
LYMPHOCYTES RELATIVE PERCENT: 22 %
MCH RBC QN AUTO: 29.2 PG (ref 26–34)
MCHC RBC AUTO-ENTMCNC: 32.9 G/DL (ref 31–36)
MCV RBC AUTO: 88.6 FL (ref 80–100)
MONOCYTES ABSOLUTE: 1.3 K/UL (ref 0–1.3)
MONOCYTES RELATIVE PERCENT: 10 %
MYELOCYTE PERCENT: 1 %
NEUTROPHILS ABSOLUTE: 8.8 K/UL (ref 1.7–7.7)
NEUTROPHILS RELATIVE PERCENT: 66 %
NUCLEATED RED BLOOD CELLS: 1 /100 WBC
PDW BLD-RTO: 14.9 % (ref 12.4–15.4)
PLATELET # BLD: 238 K/UL (ref 135–450)
PMV BLD AUTO: 10.1 FL (ref 5–10.5)
POLYCHROMASIA: ABNORMAL
POTASSIUM SERPL-SCNC: 3.6 MMOL/L (ref 3.5–5.1)
RBC # BLD: 3.09 M/UL (ref 4–5.2)
SODIUM BLD-SCNC: 138 MMOL/L (ref 136–145)
TOTAL PROTEIN: 5.3 G/DL (ref 6.4–8.2)
TOXIC GRANULATION: PRESENT
WBC # BLD: 13.2 K/UL (ref 4–11)

## 2019-01-17 PROCEDURE — 85025 COMPLETE CBC W/AUTO DIFF WBC: CPT

## 2019-01-17 PROCEDURE — 6370000000 HC RX 637 (ALT 250 FOR IP): Performed by: PHYSICIAN ASSISTANT

## 2019-01-17 PROCEDURE — 71046 X-RAY EXAM CHEST 2 VIEWS: CPT

## 2019-01-17 PROCEDURE — 80053 COMPREHEN METABOLIC PANEL: CPT

## 2019-01-17 PROCEDURE — 6360000002 HC RX W HCPCS: Performed by: INTERNAL MEDICINE

## 2019-01-17 PROCEDURE — C9113 INJ PANTOPRAZOLE SODIUM, VIA: HCPCS | Performed by: INTERNAL MEDICINE

## 2019-01-17 PROCEDURE — 85014 HEMATOCRIT: CPT

## 2019-01-17 PROCEDURE — 94760 N-INVAS EAR/PLS OXIMETRY 1: CPT

## 2019-01-17 PROCEDURE — 2580000003 HC RX 258: Performed by: INTERNAL MEDICINE

## 2019-01-17 PROCEDURE — 36415 COLL VENOUS BLD VENIPUNCTURE: CPT

## 2019-01-17 PROCEDURE — 2500000003 HC RX 250 WO HCPCS: Performed by: INTERNAL MEDICINE

## 2019-01-17 PROCEDURE — 2060000000 HC ICU INTERMEDIATE R&B

## 2019-01-17 PROCEDURE — 6370000000 HC RX 637 (ALT 250 FOR IP): Performed by: INTERNAL MEDICINE

## 2019-01-17 PROCEDURE — 85018 HEMOGLOBIN: CPT

## 2019-01-17 RX ADMIN — Medication 10 ML: at 20:50

## 2019-01-17 RX ADMIN — BENZOCAINE, MENTHOL 1 LOZENGE: 15; 3.6 LOZENGE ORAL at 11:23

## 2019-01-17 RX ADMIN — METOPROLOL TARTRATE 25 MG: 25 TABLET ORAL at 20:49

## 2019-01-17 RX ADMIN — BENZOCAINE, MENTHOL 1 LOZENGE: 15; 3.6 LOZENGE ORAL at 09:11

## 2019-01-17 RX ADMIN — AMITRIPTYLINE HYDROCHLORIDE 25 MG: 25 TABLET, FILM COATED ORAL at 20:49

## 2019-01-17 RX ADMIN — CALCIUM CARBONATE-CHOLECALCIFEROL TAB 250 MG-125 UNIT 250 MG: 250-125 TAB at 08:11

## 2019-01-17 RX ADMIN — BENZOCAINE, MENTHOL 1 LOZENGE: 15; 3.6 LOZENGE ORAL at 23:09

## 2019-01-17 RX ADMIN — ACETAMINOPHEN 650 MG: 325 TABLET, FILM COATED ORAL at 10:20

## 2019-01-17 RX ADMIN — BENZOCAINE, MENTHOL 1 LOZENGE: 15; 3.6 LOZENGE ORAL at 19:12

## 2019-01-17 RX ADMIN — APIXABAN 5 MG: 5 TABLET, FILM COATED ORAL at 14:00

## 2019-01-17 RX ADMIN — APIXABAN 5 MG: 5 TABLET, FILM COATED ORAL at 20:49

## 2019-01-17 RX ADMIN — ATORVASTATIN CALCIUM 20 MG: 20 TABLET, FILM COATED ORAL at 09:03

## 2019-01-17 RX ADMIN — SODIUM CHLORIDE: 9 INJECTION, SOLUTION INTRAVENOUS at 07:22

## 2019-01-17 RX ADMIN — THERA TABS 1 TABLET: TAB at 09:03

## 2019-01-17 RX ADMIN — GUAIFENESIN AND CODEINE PHOSPHATE 5 ML: 10; 100 LIQUID ORAL at 14:45

## 2019-01-17 RX ADMIN — Medication 10 ML: at 09:10

## 2019-01-17 RX ADMIN — BENZOCAINE, MENTHOL 1 LOZENGE: 15; 3.6 LOZENGE ORAL at 03:52

## 2019-01-17 RX ADMIN — CALCIUM CARBONATE-CHOLECALCIFEROL TAB 250 MG-125 UNIT 250 MG: 250-125 TAB at 17:22

## 2019-01-17 RX ADMIN — PANTOPRAZOLE SODIUM 40 MG: 40 INJECTION, POWDER, FOR SOLUTION INTRAVENOUS at 09:03

## 2019-01-17 RX ADMIN — METOPROLOL TARTRATE 25 MG: 25 TABLET ORAL at 11:23

## 2019-01-17 RX ADMIN — AZITHROMYCIN MONOHYDRATE 500 MG: 500 INJECTION, POWDER, LYOPHILIZED, FOR SOLUTION INTRAVENOUS at 09:03

## 2019-01-17 RX ADMIN — GUAIFENESIN AND CODEINE PHOSPHATE 5 ML: 10; 100 LIQUID ORAL at 22:02

## 2019-01-17 RX ADMIN — BENZOCAINE, MENTHOL 1 LOZENGE: 15; 3.6 LOZENGE ORAL at 14:50

## 2019-01-17 RX ADMIN — ACETAMINOPHEN 650 MG: 325 TABLET, FILM COATED ORAL at 03:52

## 2019-01-17 RX ADMIN — SODIUM CHLORIDE: 9 INJECTION, SOLUTION INTRAVENOUS at 17:22

## 2019-01-17 RX ADMIN — DILTIAZEM HYDROCHLORIDE 5 MG/HR: 5 INJECTION INTRAVENOUS at 07:22

## 2019-01-17 RX ADMIN — CEFTRIAXONE 1 G: 1 INJECTION, POWDER, FOR SOLUTION INTRAMUSCULAR; INTRAVENOUS at 08:11

## 2019-01-17 RX ADMIN — GUAIFENESIN AND CODEINE PHOSPHATE 5 ML: 10; 100 LIQUID ORAL at 08:11

## 2019-01-17 RX ADMIN — PREDNISONE 20 MG: 20 TABLET ORAL at 09:03

## 2019-01-17 RX ADMIN — PANTOPRAZOLE SODIUM 40 MG: 40 INJECTION, POWDER, FOR SOLUTION INTRAVENOUS at 20:49

## 2019-01-17 RX ADMIN — DILTIAZEM HYDROCHLORIDE 240 MG: 240 CAPSULE, COATED, EXTENDED RELEASE ORAL at 09:03

## 2019-01-17 ASSESSMENT — PAIN SCALES - GENERAL
PAINLEVEL_OUTOF10: 0
PAINLEVEL_OUTOF10: 2
PAINLEVEL_OUTOF10: 0
PAINLEVEL_OUTOF10: 0
PAINLEVEL_OUTOF10: 5
PAINLEVEL_OUTOF10: 0
PAINLEVEL_OUTOF10: 3

## 2019-01-17 ASSESSMENT — PAIN DESCRIPTION - ORIENTATION: ORIENTATION: LEFT;LOWER

## 2019-01-17 ASSESSMENT — PAIN DESCRIPTION - PAIN TYPE: TYPE: ACUTE PAIN

## 2019-01-17 ASSESSMENT — PAIN DESCRIPTION - LOCATION: LOCATION: LEG

## 2019-01-18 LAB
A/G RATIO: 1.3 (ref 1.1–2.2)
ALBUMIN SERPL-MCNC: 3 G/DL (ref 3.4–5)
ALP BLD-CCNC: 110 U/L (ref 40–129)
ALT SERPL-CCNC: 42 U/L (ref 10–40)
ANION GAP SERPL CALCULATED.3IONS-SCNC: 12 MMOL/L (ref 3–16)
AST SERPL-CCNC: 15 U/L (ref 15–37)
BASOPHILS ABSOLUTE: 0 K/UL (ref 0–0.2)
BASOPHILS RELATIVE PERCENT: 0.4 %
BILIRUB SERPL-MCNC: <0.2 MG/DL (ref 0–1)
BUN BLDV-MCNC: 17 MG/DL (ref 7–20)
CALCIUM SERPL-MCNC: 7.9 MG/DL (ref 8.3–10.6)
CHLORIDE BLD-SCNC: 104 MMOL/L (ref 99–110)
CO2: 22 MMOL/L (ref 21–32)
CREAT SERPL-MCNC: 0.6 MG/DL (ref 0.6–1.2)
EOSINOPHILS ABSOLUTE: 0.2 K/UL (ref 0–0.6)
EOSINOPHILS RELATIVE PERCENT: 1.8 %
GFR AFRICAN AMERICAN: >60
GFR NON-AFRICAN AMERICAN: >60
GLOBULIN: 2.3 G/DL
GLUCOSE BLD-MCNC: 110 MG/DL (ref 70–99)
HCT VFR BLD CALC: 26.1 % (ref 36–48)
HCT VFR BLD CALC: 26.5 % (ref 36–48)
HCT VFR BLD CALC: 28.6 % (ref 36–48)
HEMOGLOBIN: 8.4 G/DL (ref 12–16)
HEMOGLOBIN: 8.7 G/DL (ref 12–16)
HEMOGLOBIN: 9.3 G/DL (ref 12–16)
LYMPHOCYTES ABSOLUTE: 2.1 K/UL (ref 1–5.1)
LYMPHOCYTES RELATIVE PERCENT: 18.4 %
MCH RBC QN AUTO: 29.6 PG (ref 26–34)
MCHC RBC AUTO-ENTMCNC: 32.7 G/DL (ref 31–36)
MCV RBC AUTO: 90.6 FL (ref 80–100)
MONOCYTES ABSOLUTE: 0.9 K/UL (ref 0–1.3)
MONOCYTES RELATIVE PERCENT: 7.5 %
NEUTROPHILS ABSOLUTE: 8.2 K/UL (ref 1.7–7.7)
NEUTROPHILS RELATIVE PERCENT: 71.9 %
PDW BLD-RTO: 14.4 % (ref 12.4–15.4)
PLATELET # BLD: 243 K/UL (ref 135–450)
PMV BLD AUTO: 9.9 FL (ref 5–10.5)
POTASSIUM SERPL-SCNC: 3.9 MMOL/L (ref 3.5–5.1)
RBC # BLD: 2.93 M/UL (ref 4–5.2)
SODIUM BLD-SCNC: 138 MMOL/L (ref 136–145)
TOTAL PROTEIN: 5.3 G/DL (ref 6.4–8.2)
WBC # BLD: 11.4 K/UL (ref 4–11)

## 2019-01-18 PROCEDURE — 85014 HEMATOCRIT: CPT

## 2019-01-18 PROCEDURE — 94760 N-INVAS EAR/PLS OXIMETRY 1: CPT

## 2019-01-18 PROCEDURE — 6370000000 HC RX 637 (ALT 250 FOR IP): Performed by: INTERNAL MEDICINE

## 2019-01-18 PROCEDURE — 2580000003 HC RX 258: Performed by: INTERNAL MEDICINE

## 2019-01-18 PROCEDURE — 85018 HEMOGLOBIN: CPT

## 2019-01-18 PROCEDURE — 80053 COMPREHEN METABOLIC PANEL: CPT

## 2019-01-18 PROCEDURE — 6370000000 HC RX 637 (ALT 250 FOR IP): Performed by: PHYSICIAN ASSISTANT

## 2019-01-18 PROCEDURE — 36415 COLL VENOUS BLD VENIPUNCTURE: CPT

## 2019-01-18 PROCEDURE — 6360000002 HC RX W HCPCS: Performed by: INTERNAL MEDICINE

## 2019-01-18 PROCEDURE — 2060000000 HC ICU INTERMEDIATE R&B

## 2019-01-18 PROCEDURE — 85025 COMPLETE CBC W/AUTO DIFF WBC: CPT

## 2019-01-18 RX ORDER — METOPROLOL TARTRATE 50 MG/1
50 TABLET, FILM COATED ORAL 2 TIMES DAILY
Status: DISCONTINUED | OUTPATIENT
Start: 2019-01-18 | End: 2019-01-20 | Stop reason: HOSPADM

## 2019-01-18 RX ORDER — POLYETHYLENE GLYCOL 3350 17 G/17G
17 POWDER, FOR SOLUTION ORAL DAILY
Status: DISCONTINUED | OUTPATIENT
Start: 2019-01-18 | End: 2019-01-20 | Stop reason: HOSPADM

## 2019-01-18 RX ORDER — PANTOPRAZOLE SODIUM 40 MG/1
40 TABLET, DELAYED RELEASE ORAL
Status: DISCONTINUED | OUTPATIENT
Start: 2019-01-18 | End: 2019-01-20 | Stop reason: HOSPADM

## 2019-01-18 RX ADMIN — ATORVASTATIN CALCIUM 20 MG: 20 TABLET, FILM COATED ORAL at 09:15

## 2019-01-18 RX ADMIN — POLYETHYLENE GLYCOL 3350 17 G: 17 POWDER, FOR SOLUTION ORAL at 09:14

## 2019-01-18 RX ADMIN — CALCIUM CARBONATE-CHOLECALCIFEROL TAB 250 MG-125 UNIT 250 MG: 250-125 TAB at 09:15

## 2019-01-18 RX ADMIN — GUAIFENESIN AND CODEINE PHOSPHATE 5 ML: 10; 100 LIQUID ORAL at 09:56

## 2019-01-18 RX ADMIN — CEFTRIAXONE 1 G: 1 INJECTION, POWDER, FOR SOLUTION INTRAMUSCULAR; INTRAVENOUS at 09:16

## 2019-01-18 RX ADMIN — BENZOCAINE, MENTHOL 1 LOZENGE: 15; 3.6 LOZENGE ORAL at 14:08

## 2019-01-18 RX ADMIN — APIXABAN 5 MG: 5 TABLET, FILM COATED ORAL at 19:53

## 2019-01-18 RX ADMIN — THERA TABS 1 TABLET: TAB at 09:15

## 2019-01-18 RX ADMIN — APIXABAN 5 MG: 5 TABLET, FILM COATED ORAL at 09:15

## 2019-01-18 RX ADMIN — DILTIAZEM HYDROCHLORIDE 240 MG: 240 CAPSULE, COATED, EXTENDED RELEASE ORAL at 09:15

## 2019-01-18 RX ADMIN — PREDNISONE 20 MG: 20 TABLET ORAL at 09:14

## 2019-01-18 RX ADMIN — METOPROLOL TARTRATE 25 MG: 25 TABLET ORAL at 09:14

## 2019-01-18 RX ADMIN — Medication 10 ML: at 19:56

## 2019-01-18 RX ADMIN — BENZOCAINE, MENTHOL 1 LOZENGE: 15; 3.6 LOZENGE ORAL at 09:56

## 2019-01-18 RX ADMIN — CALCIUM CARBONATE-CHOLECALCIFEROL TAB 250 MG-125 UNIT 250 MG: 250-125 TAB at 16:36

## 2019-01-18 RX ADMIN — PANTOPRAZOLE SODIUM 40 MG: 40 TABLET, DELAYED RELEASE ORAL at 09:15

## 2019-01-18 RX ADMIN — METOPROLOL TARTRATE 25 MG: 25 TABLET ORAL at 14:08

## 2019-01-18 RX ADMIN — BENZOCAINE, MENTHOL 1 LOZENGE: 15; 3.6 LOZENGE ORAL at 20:15

## 2019-01-18 RX ADMIN — AZITHROMYCIN MONOHYDRATE 500 MG: 500 INJECTION, POWDER, LYOPHILIZED, FOR SOLUTION INTRAVENOUS at 09:16

## 2019-01-18 RX ADMIN — PANTOPRAZOLE SODIUM 40 MG: 40 TABLET, DELAYED RELEASE ORAL at 16:36

## 2019-01-18 RX ADMIN — GUAIFENESIN AND CODEINE PHOSPHATE 5 ML: 10; 100 LIQUID ORAL at 20:15

## 2019-01-18 RX ADMIN — METOPROLOL TARTRATE 50 MG: 50 TABLET ORAL at 19:54

## 2019-01-18 RX ADMIN — AMITRIPTYLINE HYDROCHLORIDE 25 MG: 25 TABLET, FILM COATED ORAL at 19:53

## 2019-01-18 ASSESSMENT — PAIN SCALES - GENERAL
PAINLEVEL_OUTOF10: 0

## 2019-01-19 LAB
HCT VFR BLD CALC: 26.5 % (ref 36–48)
HCT VFR BLD CALC: 29.4 % (ref 36–48)
HEMOGLOBIN: 8.7 G/DL (ref 12–16)
HEMOGLOBIN: 9.6 G/DL (ref 12–16)

## 2019-01-19 PROCEDURE — 85018 HEMOGLOBIN: CPT

## 2019-01-19 PROCEDURE — 6370000000 HC RX 637 (ALT 250 FOR IP): Performed by: INTERNAL MEDICINE

## 2019-01-19 PROCEDURE — 94760 N-INVAS EAR/PLS OXIMETRY 1: CPT

## 2019-01-19 PROCEDURE — 85014 HEMATOCRIT: CPT

## 2019-01-19 PROCEDURE — 36415 COLL VENOUS BLD VENIPUNCTURE: CPT

## 2019-01-19 PROCEDURE — 87338 HPYLORI STOOL AG IA: CPT

## 2019-01-19 PROCEDURE — 2580000003 HC RX 258: Performed by: INTERNAL MEDICINE

## 2019-01-19 PROCEDURE — 6360000002 HC RX W HCPCS: Performed by: INTERNAL MEDICINE

## 2019-01-19 PROCEDURE — 2060000000 HC ICU INTERMEDIATE R&B

## 2019-01-19 PROCEDURE — 6370000000 HC RX 637 (ALT 250 FOR IP): Performed by: PHYSICIAN ASSISTANT

## 2019-01-19 RX ORDER — AZITHROMYCIN 500 MG/1
500 TABLET, FILM COATED ORAL DAILY
Status: DISCONTINUED | OUTPATIENT
Start: 2019-01-20 | End: 2019-01-20 | Stop reason: HOSPADM

## 2019-01-19 RX ORDER — DILTIAZEM HYDROCHLORIDE 180 MG/1
360 CAPSULE, COATED, EXTENDED RELEASE ORAL DAILY
Status: DISCONTINUED | OUTPATIENT
Start: 2019-01-20 | End: 2019-01-20 | Stop reason: HOSPADM

## 2019-01-19 RX ORDER — DILTIAZEM HYDROCHLORIDE 120 MG/1
120 CAPSULE, COATED, EXTENDED RELEASE ORAL ONCE
Status: COMPLETED | OUTPATIENT
Start: 2019-01-19 | End: 2019-01-19

## 2019-01-19 RX ORDER — DOCUSATE SODIUM 100 MG/1
100 CAPSULE, LIQUID FILLED ORAL 2 TIMES DAILY PRN
Status: DISCONTINUED | OUTPATIENT
Start: 2019-01-19 | End: 2019-01-20 | Stop reason: HOSPADM

## 2019-01-19 RX ADMIN — AMITRIPTYLINE HYDROCHLORIDE 25 MG: 25 TABLET, FILM COATED ORAL at 20:30

## 2019-01-19 RX ADMIN — Medication 10 ML: at 08:41

## 2019-01-19 RX ADMIN — PANTOPRAZOLE SODIUM 40 MG: 40 TABLET, DELAYED RELEASE ORAL at 05:40

## 2019-01-19 RX ADMIN — PANTOPRAZOLE SODIUM 40 MG: 40 TABLET, DELAYED RELEASE ORAL at 17:24

## 2019-01-19 RX ADMIN — PREDNISONE 20 MG: 20 TABLET ORAL at 08:39

## 2019-01-19 RX ADMIN — DILTIAZEM HYDROCHLORIDE 240 MG: 240 CAPSULE, COATED, EXTENDED RELEASE ORAL at 08:39

## 2019-01-19 RX ADMIN — METOPROLOL TARTRATE 50 MG: 50 TABLET ORAL at 08:39

## 2019-01-19 RX ADMIN — DOCUSATE SODIUM 100 MG: 100 CAPSULE, LIQUID FILLED ORAL at 17:24

## 2019-01-19 RX ADMIN — Medication 10 ML: at 20:31

## 2019-01-19 RX ADMIN — POLYETHYLENE GLYCOL 3350 17 G: 17 POWDER, FOR SOLUTION ORAL at 08:39

## 2019-01-19 RX ADMIN — CEFTRIAXONE 1 G: 1 INJECTION, POWDER, FOR SOLUTION INTRAMUSCULAR; INTRAVENOUS at 08:40

## 2019-01-19 RX ADMIN — BENZOCAINE, MENTHOL 1 LOZENGE: 15; 3.6 LOZENGE ORAL at 05:40

## 2019-01-19 RX ADMIN — APIXABAN 5 MG: 5 TABLET, FILM COATED ORAL at 08:39

## 2019-01-19 RX ADMIN — GUAIFENESIN AND CODEINE PHOSPHATE 5 ML: 10; 100 LIQUID ORAL at 05:40

## 2019-01-19 RX ADMIN — THERA TABS 1 TABLET: TAB at 08:39

## 2019-01-19 RX ADMIN — AZITHROMYCIN MONOHYDRATE 500 MG: 500 INJECTION, POWDER, LYOPHILIZED, FOR SOLUTION INTRAVENOUS at 08:40

## 2019-01-19 RX ADMIN — DILTIAZEM HYDROCHLORIDE 120 MG: 120 CAPSULE, COATED, EXTENDED RELEASE ORAL at 12:14

## 2019-01-19 RX ADMIN — METOPROLOL TARTRATE 50 MG: 50 TABLET ORAL at 20:30

## 2019-01-19 RX ADMIN — CALCIUM CARBONATE-CHOLECALCIFEROL TAB 250 MG-125 UNIT 250 MG: 250-125 TAB at 17:24

## 2019-01-19 RX ADMIN — CALCIUM CARBONATE-CHOLECALCIFEROL TAB 250 MG-125 UNIT 250 MG: 250-125 TAB at 08:39

## 2019-01-19 RX ADMIN — ATORVASTATIN CALCIUM 20 MG: 20 TABLET, FILM COATED ORAL at 08:39

## 2019-01-19 RX ADMIN — APIXABAN 5 MG: 5 TABLET, FILM COATED ORAL at 20:30

## 2019-01-19 ASSESSMENT — PAIN SCALES - GENERAL
PAINLEVEL_OUTOF10: 0

## 2019-01-20 VITALS
OXYGEN SATURATION: 95 % | HEIGHT: 62 IN | BODY MASS INDEX: 32.58 KG/M2 | WEIGHT: 177.03 LBS | HEART RATE: 81 BPM | TEMPERATURE: 97.8 F | SYSTOLIC BLOOD PRESSURE: 129 MMHG | RESPIRATION RATE: 15 BRPM | DIASTOLIC BLOOD PRESSURE: 67 MMHG

## 2019-01-20 LAB
A/G RATIO: 1.2 (ref 1.1–2.2)
ALBUMIN SERPL-MCNC: 3.1 G/DL (ref 3.4–5)
ALP BLD-CCNC: 93 U/L (ref 40–129)
ALT SERPL-CCNC: 37 U/L (ref 10–40)
ANION GAP SERPL CALCULATED.3IONS-SCNC: 10 MMOL/L (ref 3–16)
AST SERPL-CCNC: 16 U/L (ref 15–37)
BASOPHILS ABSOLUTE: 0.1 K/UL (ref 0–0.2)
BASOPHILS RELATIVE PERCENT: 0.6 %
BILIRUB SERPL-MCNC: <0.2 MG/DL (ref 0–1)
BUN BLDV-MCNC: 20 MG/DL (ref 7–20)
CALCIUM SERPL-MCNC: 8.7 MG/DL (ref 8.3–10.6)
CHLORIDE BLD-SCNC: 101 MMOL/L (ref 99–110)
CO2: 28 MMOL/L (ref 21–32)
CREAT SERPL-MCNC: 0.8 MG/DL (ref 0.6–1.2)
EOSINOPHILS ABSOLUTE: 0.3 K/UL (ref 0–0.6)
EOSINOPHILS RELATIVE PERCENT: 2 %
GFR AFRICAN AMERICAN: >60
GFR NON-AFRICAN AMERICAN: >60
GLOBULIN: 2.5 G/DL
GLUCOSE BLD-MCNC: 98 MG/DL (ref 70–99)
HCT VFR BLD CALC: 29 % (ref 36–48)
HEMOGLOBIN: 9.5 G/DL (ref 12–16)
LYMPHOCYTES ABSOLUTE: 2.9 K/UL (ref 1–5.1)
LYMPHOCYTES RELATIVE PERCENT: 20.4 %
MCH RBC QN AUTO: 29.1 PG (ref 26–34)
MCHC RBC AUTO-ENTMCNC: 32.7 G/DL (ref 31–36)
MCV RBC AUTO: 88.8 FL (ref 80–100)
MONOCYTES ABSOLUTE: 1 K/UL (ref 0–1.3)
MONOCYTES RELATIVE PERCENT: 6.9 %
NEUTROPHILS ABSOLUTE: 10 K/UL (ref 1.7–7.7)
NEUTROPHILS RELATIVE PERCENT: 70.1 %
PDW BLD-RTO: 15.1 % (ref 12.4–15.4)
PLATELET # BLD: 288 K/UL (ref 135–450)
PMV BLD AUTO: 9.9 FL (ref 5–10.5)
POTASSIUM SERPL-SCNC: 4.7 MMOL/L (ref 3.5–5.1)
RBC # BLD: 3.26 M/UL (ref 4–5.2)
SODIUM BLD-SCNC: 139 MMOL/L (ref 136–145)
TOTAL PROTEIN: 5.6 G/DL (ref 6.4–8.2)
WBC # BLD: 14.3 K/UL (ref 4–11)

## 2019-01-20 PROCEDURE — 6360000002 HC RX W HCPCS: Performed by: INTERNAL MEDICINE

## 2019-01-20 PROCEDURE — 6370000000 HC RX 637 (ALT 250 FOR IP): Performed by: PHYSICIAN ASSISTANT

## 2019-01-20 PROCEDURE — 85025 COMPLETE CBC W/AUTO DIFF WBC: CPT

## 2019-01-20 PROCEDURE — 6370000000 HC RX 637 (ALT 250 FOR IP): Performed by: INTERNAL MEDICINE

## 2019-01-20 PROCEDURE — 80053 COMPREHEN METABOLIC PANEL: CPT

## 2019-01-20 PROCEDURE — 36415 COLL VENOUS BLD VENIPUNCTURE: CPT

## 2019-01-20 PROCEDURE — 2580000003 HC RX 258: Performed by: INTERNAL MEDICINE

## 2019-01-20 RX ORDER — DILTIAZEM HYDROCHLORIDE 360 MG/1
360 CAPSULE, EXTENDED RELEASE ORAL DAILY
Qty: 30 CAPSULE | Refills: 0 | Status: SHIPPED | OUTPATIENT
Start: 2019-01-21 | End: 2019-01-28 | Stop reason: ALTCHOICE

## 2019-01-20 RX ORDER — PANTOPRAZOLE SODIUM 40 MG/1
40 TABLET, DELAYED RELEASE ORAL
Qty: 30 TABLET | Refills: 3 | Status: SHIPPED | OUTPATIENT
Start: 2019-01-20 | End: 2019-04-01 | Stop reason: SDUPTHER

## 2019-01-20 RX ORDER — CEFUROXIME AXETIL 250 MG/1
500 TABLET ORAL 2 TIMES DAILY
Qty: 6 TABLET | Refills: 0 | Status: SHIPPED | OUTPATIENT
Start: 2019-01-20 | End: 2019-01-22 | Stop reason: CLARIF

## 2019-01-20 RX ADMIN — PANTOPRAZOLE SODIUM 40 MG: 40 TABLET, DELAYED RELEASE ORAL at 16:28

## 2019-01-20 RX ADMIN — DOCUSATE SODIUM 100 MG: 100 CAPSULE, LIQUID FILLED ORAL at 08:30

## 2019-01-20 RX ADMIN — PREDNISONE 20 MG: 20 TABLET ORAL at 08:31

## 2019-01-20 RX ADMIN — PANTOPRAZOLE SODIUM 40 MG: 40 TABLET, DELAYED RELEASE ORAL at 06:52

## 2019-01-20 RX ADMIN — Medication 10 ML: at 08:31

## 2019-01-20 RX ADMIN — AZITHROMYCIN 500 MG: 500 TABLET, FILM COATED ORAL at 08:31

## 2019-01-20 RX ADMIN — GUAIFENESIN AND CODEINE PHOSPHATE 5 ML: 10; 100 LIQUID ORAL at 08:36

## 2019-01-20 RX ADMIN — METOPROLOL TARTRATE 50 MG: 50 TABLET ORAL at 08:30

## 2019-01-20 RX ADMIN — APIXABAN 5 MG: 5 TABLET, FILM COATED ORAL at 08:31

## 2019-01-20 RX ADMIN — DILTIAZEM HYDROCHLORIDE 360 MG: 180 CAPSULE, COATED, EXTENDED RELEASE ORAL at 08:30

## 2019-01-20 RX ADMIN — CEFTRIAXONE 1 G: 1 INJECTION, POWDER, FOR SOLUTION INTRAMUSCULAR; INTRAVENOUS at 08:31

## 2019-01-20 RX ADMIN — CALCIUM CARBONATE-CHOLECALCIFEROL TAB 250 MG-125 UNIT 250 MG: 250-125 TAB at 16:28

## 2019-01-20 RX ADMIN — THERA TABS 1 TABLET: TAB at 08:30

## 2019-01-20 RX ADMIN — BENZOCAINE, MENTHOL 1 LOZENGE: 15; 3.6 LOZENGE ORAL at 16:28

## 2019-01-20 RX ADMIN — CALCIUM CARBONATE-CHOLECALCIFEROL TAB 250 MG-125 UNIT 250 MG: 250-125 TAB at 08:30

## 2019-01-20 RX ADMIN — GUAIFENESIN AND CODEINE PHOSPHATE 5 ML: 10; 100 LIQUID ORAL at 16:28

## 2019-01-20 RX ADMIN — BENZOCAINE, MENTHOL 1 LOZENGE: 15; 3.6 LOZENGE ORAL at 08:36

## 2019-01-20 RX ADMIN — ATORVASTATIN CALCIUM 20 MG: 20 TABLET, FILM COATED ORAL at 08:30

## 2019-01-21 ENCOUNTER — TELEPHONE (OUTPATIENT)
Dept: INTERNAL MEDICINE CLINIC | Age: 79
End: 2019-01-21

## 2019-01-21 ENCOUNTER — CARE COORDINATION (OUTPATIENT)
Dept: CASE MANAGEMENT | Age: 79
End: 2019-01-21

## 2019-01-21 DIAGNOSIS — I50.31 ACUTE DIASTOLIC CONGESTIVE HEART FAILURE (HCC): ICD-10-CM

## 2019-01-21 RX ORDER — FUROSEMIDE 40 MG/1
40 TABLET ORAL DAILY
Qty: 30 TABLET | Refills: 5 | Status: SHIPPED | OUTPATIENT
Start: 2019-01-21 | End: 2019-01-28 | Stop reason: SDUPTHER

## 2019-01-22 ENCOUNTER — TELEPHONE (OUTPATIENT)
Dept: INTERNAL MEDICINE CLINIC | Age: 79
End: 2019-01-22

## 2019-01-22 ENCOUNTER — CARE COORDINATION (OUTPATIENT)
Dept: CASE MANAGEMENT | Age: 79
End: 2019-01-22

## 2019-01-22 RX ORDER — CEFUROXIME AXETIL 500 MG/1
500 TABLET ORAL 2 TIMES DAILY
Qty: 6 TABLET | Refills: 0 | Status: SHIPPED | OUTPATIENT
Start: 2019-01-22 | End: 2019-01-28 | Stop reason: ALTCHOICE

## 2019-01-23 LAB — H PYLORI ANTIGEN STOOL: NEGATIVE

## 2019-01-23 RX ORDER — ATORVASTATIN CALCIUM 20 MG/1
TABLET, FILM COATED ORAL
Qty: 30 TABLET | Refills: 5 | Status: SHIPPED | OUTPATIENT
Start: 2019-01-23 | End: 2019-05-28 | Stop reason: SDUPTHER

## 2019-01-28 ENCOUNTER — OFFICE VISIT (OUTPATIENT)
Dept: INTERNAL MEDICINE CLINIC | Age: 79
End: 2019-01-28
Payer: MEDICARE

## 2019-01-28 ENCOUNTER — CARE COORDINATION (OUTPATIENT)
Dept: CARE COORDINATION | Age: 79
End: 2019-01-28

## 2019-01-28 VITALS
BODY MASS INDEX: 30.73 KG/M2 | RESPIRATION RATE: 18 BRPM | OXYGEN SATURATION: 93 % | WEIGHT: 168 LBS | HEART RATE: 103 BPM | DIASTOLIC BLOOD PRESSURE: 62 MMHG | TEMPERATURE: 98.1 F | SYSTOLIC BLOOD PRESSURE: 130 MMHG

## 2019-01-28 DIAGNOSIS — J18.9 PNEUMONIA OF RIGHT LOWER LOBE DUE TO INFECTIOUS ORGANISM: ICD-10-CM

## 2019-01-28 DIAGNOSIS — R60.0 BILATERAL LEG EDEMA: ICD-10-CM

## 2019-01-28 DIAGNOSIS — I48.19 PERSISTENT ATRIAL FIBRILLATION (HCC): ICD-10-CM

## 2019-01-28 DIAGNOSIS — K27.9 PUD (PEPTIC ULCER DISEASE): Primary | ICD-10-CM

## 2019-01-28 DIAGNOSIS — I50.31 ACUTE DIASTOLIC CONGESTIVE HEART FAILURE (HCC): ICD-10-CM

## 2019-01-28 PROCEDURE — G8598 ASA/ANTIPLAT THER USED: HCPCS | Performed by: INTERNAL MEDICINE

## 2019-01-28 PROCEDURE — G8484 FLU IMMUNIZE NO ADMIN: HCPCS | Performed by: INTERNAL MEDICINE

## 2019-01-28 PROCEDURE — 1111F DSCHRG MED/CURRENT MED MERGE: CPT | Performed by: INTERNAL MEDICINE

## 2019-01-28 PROCEDURE — G8417 CALC BMI ABV UP PARAM F/U: HCPCS | Performed by: INTERNAL MEDICINE

## 2019-01-28 PROCEDURE — G8427 DOCREV CUR MEDS BY ELIG CLIN: HCPCS | Performed by: INTERNAL MEDICINE

## 2019-01-28 PROCEDURE — 4040F PNEUMOC VAC/ADMIN/RCVD: CPT | Performed by: INTERNAL MEDICINE

## 2019-01-28 PROCEDURE — G8399 PT W/DXA RESULTS DOCUMENT: HCPCS | Performed by: INTERNAL MEDICINE

## 2019-01-28 PROCEDURE — 99214 OFFICE O/P EST MOD 30 MIN: CPT | Performed by: INTERNAL MEDICINE

## 2019-01-28 PROCEDURE — 1123F ACP DISCUSS/DSCN MKR DOCD: CPT | Performed by: INTERNAL MEDICINE

## 2019-01-28 PROCEDURE — 1090F PRES/ABSN URINE INCON ASSESS: CPT | Performed by: INTERNAL MEDICINE

## 2019-01-28 PROCEDURE — 1101F PT FALLS ASSESS-DOCD LE1/YR: CPT | Performed by: INTERNAL MEDICINE

## 2019-01-28 PROCEDURE — 1036F TOBACCO NON-USER: CPT | Performed by: INTERNAL MEDICINE

## 2019-01-28 RX ORDER — FUROSEMIDE 40 MG/1
TABLET ORAL
Qty: 30 TABLET | Refills: 5 | Status: ON HOLD | OUTPATIENT
Start: 2019-01-28 | End: 2019-07-05 | Stop reason: HOSPADM

## 2019-01-28 RX ORDER — DILTIAZEM HYDROCHLORIDE 360 MG/1
360 CAPSULE, EXTENDED RELEASE ORAL DAILY
Qty: 30 CAPSULE | Refills: 0
Start: 2019-01-28 | End: 2019-02-21 | Stop reason: SDUPTHER

## 2019-02-02 ASSESSMENT — ENCOUNTER SYMPTOMS
VOMITING: 0
NAUSEA: 0
DIARRHEA: 0
SORE THROAT: 1
BLOOD IN STOOL: 0
COUGH: 1
SINUS PRESSURE: 0
ABDOMINAL PAIN: 0

## 2019-02-07 ENCOUNTER — CARE COORDINATION (OUTPATIENT)
Dept: CARE COORDINATION | Age: 79
End: 2019-02-07

## 2019-02-14 LAB
HCT VFR BLD CALC: 35.3 % (ref 36–48)
HEMOGLOBIN: 11.5 G/DL (ref 12–16)
MCH RBC QN AUTO: 28.8 PG (ref 26–34)
MCHC RBC AUTO-ENTMCNC: 32.5 G/DL (ref 31–36)
MCV RBC AUTO: 88.7 FL (ref 80–100)
PDW BLD-RTO: 15.4 % (ref 12.4–15.4)
PLATELET # BLD: 217 K/UL (ref 135–450)
PMV BLD AUTO: 11.2 FL (ref 5–10.5)
RBC # BLD: 3.98 M/UL (ref 4–5.2)
WBC # BLD: 6.7 K/UL (ref 4–11)

## 2019-02-19 ENCOUNTER — CARE COORDINATION (OUTPATIENT)
Dept: CARE COORDINATION | Age: 79
End: 2019-02-19

## 2019-02-21 RX ORDER — DILTIAZEM HYDROCHLORIDE 360 MG/1
360 CAPSULE, EXTENDED RELEASE ORAL DAILY
Qty: 90 CAPSULE | Refills: 1 | Status: ON HOLD | OUTPATIENT
Start: 2019-02-21 | End: 2019-07-05 | Stop reason: HOSPADM

## 2019-03-11 ENCOUNTER — CARE COORDINATION (OUTPATIENT)
Dept: CARE COORDINATION | Age: 79
End: 2019-03-11

## 2019-03-11 ENCOUNTER — OFFICE VISIT (OUTPATIENT)
Dept: INTERNAL MEDICINE CLINIC | Age: 79
End: 2019-03-11
Payer: MEDICARE

## 2019-03-11 VITALS
BODY MASS INDEX: 29.81 KG/M2 | OXYGEN SATURATION: 93 % | WEIGHT: 163 LBS | HEART RATE: 53 BPM | TEMPERATURE: 98.2 F | DIASTOLIC BLOOD PRESSURE: 64 MMHG | SYSTOLIC BLOOD PRESSURE: 102 MMHG

## 2019-03-11 DIAGNOSIS — K27.9 PUD (PEPTIC ULCER DISEASE): ICD-10-CM

## 2019-03-11 DIAGNOSIS — E78.2 MIXED HYPERLIPIDEMIA: ICD-10-CM

## 2019-03-11 DIAGNOSIS — I50.32 CHRONIC DIASTOLIC CONGESTIVE HEART FAILURE (HCC): ICD-10-CM

## 2019-03-11 DIAGNOSIS — I48.20 CHRONIC ATRIAL FIBRILLATION (HCC): Primary | ICD-10-CM

## 2019-03-11 PROCEDURE — G8417 CALC BMI ABV UP PARAM F/U: HCPCS | Performed by: INTERNAL MEDICINE

## 2019-03-11 PROCEDURE — 99214 OFFICE O/P EST MOD 30 MIN: CPT | Performed by: INTERNAL MEDICINE

## 2019-03-11 PROCEDURE — G8399 PT W/DXA RESULTS DOCUMENT: HCPCS | Performed by: INTERNAL MEDICINE

## 2019-03-11 PROCEDURE — 4040F PNEUMOC VAC/ADMIN/RCVD: CPT | Performed by: INTERNAL MEDICINE

## 2019-03-11 PROCEDURE — 1123F ACP DISCUSS/DSCN MKR DOCD: CPT | Performed by: INTERNAL MEDICINE

## 2019-03-11 PROCEDURE — G8598 ASA/ANTIPLAT THER USED: HCPCS | Performed by: INTERNAL MEDICINE

## 2019-03-11 PROCEDURE — G8484 FLU IMMUNIZE NO ADMIN: HCPCS | Performed by: INTERNAL MEDICINE

## 2019-03-11 PROCEDURE — 1101F PT FALLS ASSESS-DOCD LE1/YR: CPT | Performed by: INTERNAL MEDICINE

## 2019-03-11 PROCEDURE — 1090F PRES/ABSN URINE INCON ASSESS: CPT | Performed by: INTERNAL MEDICINE

## 2019-03-11 PROCEDURE — 1036F TOBACCO NON-USER: CPT | Performed by: INTERNAL MEDICINE

## 2019-03-11 PROCEDURE — G8427 DOCREV CUR MEDS BY ELIG CLIN: HCPCS | Performed by: INTERNAL MEDICINE

## 2019-03-11 RX ORDER — AMITRIPTYLINE HYDROCHLORIDE 25 MG/1
TABLET, FILM COATED ORAL
Qty: 90 TABLET | Refills: 0 | Status: SHIPPED | OUTPATIENT
Start: 2019-03-11 | End: 2019-05-31 | Stop reason: SDUPTHER

## 2019-03-17 PROBLEM — I50.32 CHRONIC DIASTOLIC CONGESTIVE HEART FAILURE (HCC): Status: ACTIVE | Noted: 2019-03-17

## 2019-03-17 PROBLEM — I48.20 CHRONIC ATRIAL FIBRILLATION (HCC): Status: ACTIVE | Noted: 2018-10-25

## 2019-03-17 PROBLEM — K27.9 PUD (PEPTIC ULCER DISEASE): Status: ACTIVE | Noted: 2019-03-17

## 2019-03-17 ASSESSMENT — ENCOUNTER SYMPTOMS
NAUSEA: 0
VOMITING: 0
SHORTNESS OF BREATH: 0
ABDOMINAL PAIN: 0
BLOOD IN STOOL: 0
COUGH: 0
DIARRHEA: 0

## 2019-04-01 RX ORDER — PANTOPRAZOLE SODIUM 40 MG/1
40 TABLET, DELAYED RELEASE ORAL
Qty: 60 TABLET | Refills: 3 | Status: ON HOLD | OUTPATIENT
Start: 2019-04-01 | End: 2019-07-03

## 2019-04-01 RX ORDER — APIXABAN 5 MG/1
TABLET, FILM COATED ORAL
Qty: 60 TABLET | Refills: 3 | Status: SHIPPED | OUTPATIENT
Start: 2019-04-01 | End: 2019-08-03 | Stop reason: SDUPTHER

## 2019-05-01 ENCOUNTER — TELEPHONE (OUTPATIENT)
Dept: INTERNAL MEDICINE CLINIC | Age: 79
End: 2019-05-01

## 2019-05-01 RX ORDER — BETAMETHASONE DIPROPIONATE 0.05 %
OINTMENT (GRAM) TOPICAL
Qty: 45 G | Refills: 0 | Status: SHIPPED | OUTPATIENT
Start: 2019-05-01 | End: 2019-06-11

## 2019-05-14 ENCOUNTER — CARE COORDINATION (OUTPATIENT)
Dept: CARE COORDINATION | Age: 79
End: 2019-05-14

## 2019-05-14 NOTE — CARE COORDINATION
Ambulatory Care Coordination Note  5/14/2019  CM Risk Score: 9  Elva Mortality Risk Score: 9    ACC: Ratna Cabral, RN    Summary Note: RNCC spoke with Alivia Cross today. She reports that she is doing better. She has started watching her salt intake and following 5-20% rule. Since watching her salt intake her LE edema has improved. She denies increased SOB. She feels that her heart rate is in better control. Action:   Reviewed CHF zones. Praised compliance with low sodium diet. Discussed exercise plan. Encouraged to set exercise goal.  Made exercise suggestions. PLAN:  F/u at next visit  Future Appointments   Date Time Provider Carlyle Parmar   6/11/2019  4:30 PM MD BAKARI [facility]     Possible discharge if stable. Care Coordination Interventions    Program Enrollment:  Complex Care  Referral from Primary Care Provider:  No  Suggested Interventions and Community Resources  Fall Risk Prevention:  Completed  Zone Management Tools:  Completed (Comment: CHF zone handout)         Goals Addressed                 This Visit's Progress       Patient Stated     Patient Stated (pt-stated)   On track     Pt will follow sodium rule of 5% -20% and will try to eat foods closer to 5%. Barriers: over-whelmed by complexity  Plan for overcoming my barriers: handout, resource and reinforcement of teaching  Confidence: 6/10  Anticipated Goal Completion Date: continuous            Prior to Admission medications    Medication Sig Start Date End Date Taking?  Authorizing Provider   betamethasone dipropionate (DIPROLENE) 0.05 % ointment Apply to rash area twice daily 5/1/19   Spencer Shah MD   ELIQUIS 5 MG TABS tablet TAKE ONE TABLET BY MOUTH TWICE A DAY 4/1/19   Spencer Shah MD   pantoprazole (PROTONIX) 40 MG tablet Take 1 tablet by mouth 2 times daily (before meals) 4/1/19   Spencer Shah MD   aspirin 81 MG tablet Take 81 mg by mouth daily    Historical Provider, MD   amitriptyline (ELAVIL)

## 2019-05-28 RX ORDER — ATORVASTATIN CALCIUM 20 MG/1
TABLET, FILM COATED ORAL
Qty: 90 TABLET | Refills: 0 | Status: SHIPPED | OUTPATIENT
Start: 2019-05-28 | End: 2019-08-26 | Stop reason: SDUPTHER

## 2019-05-29 NOTE — TELEPHONE ENCOUNTER
Apt scheduled 6/11/19. Pls remind pt to have fasting bw done at least 3 days prior to apt.     Left message for pt to return call @ [phone]

## 2019-06-01 DIAGNOSIS — E78.2 MIXED HYPERLIPIDEMIA: ICD-10-CM

## 2019-06-01 DIAGNOSIS — I50.32 CHRONIC DIASTOLIC CONGESTIVE HEART FAILURE (HCC): ICD-10-CM

## 2019-06-01 DIAGNOSIS — I48.20 CHRONIC ATRIAL FIBRILLATION (HCC): ICD-10-CM

## 2019-06-01 DIAGNOSIS — K27.9 PUD (PEPTIC ULCER DISEASE): ICD-10-CM

## 2019-06-01 LAB
A/G RATIO: 1.6 (ref 1.1–2.2)
ALBUMIN SERPL-MCNC: 4.5 G/DL (ref 3.4–5)
ALP BLD-CCNC: 87 U/L (ref 40–129)
ALT SERPL-CCNC: 27 U/L (ref 10–40)
ANION GAP SERPL CALCULATED.3IONS-SCNC: 15 MMOL/L (ref 3–16)
AST SERPL-CCNC: 25 U/L (ref 15–37)
BILIRUB SERPL-MCNC: 0.5 MG/DL (ref 0–1)
BUN BLDV-MCNC: 41 MG/DL (ref 7–20)
CALCIUM SERPL-MCNC: 9.8 MG/DL (ref 8.3–10.6)
CHLORIDE BLD-SCNC: 98 MMOL/L (ref 99–110)
CHOLESTEROL, TOTAL: 177 MG/DL (ref 0–199)
CO2: 25 MMOL/L (ref 21–32)
CREAT SERPL-MCNC: 1 MG/DL (ref 0.6–1.2)
GFR AFRICAN AMERICAN: >60
GFR NON-AFRICAN AMERICAN: 54
GLOBULIN: 2.9 G/DL
GLUCOSE BLD-MCNC: 119 MG/DL (ref 70–99)
HCT VFR BLD CALC: 38.5 % (ref 36–48)
HDLC SERPL-MCNC: 66 MG/DL (ref 40–60)
HEMOGLOBIN: 12.1 G/DL (ref 12–16)
LDL CHOLESTEROL CALCULATED: 95 MG/DL
MCH RBC QN AUTO: 26.1 PG (ref 26–34)
MCHC RBC AUTO-ENTMCNC: 31.5 G/DL (ref 31–36)
MCV RBC AUTO: 82.8 FL (ref 80–100)
PDW BLD-RTO: 16.4 % (ref 12.4–15.4)
PLATELET # BLD: 148 K/UL (ref 135–450)
PMV BLD AUTO: 12.5 FL (ref 5–10.5)
POTASSIUM SERPL-SCNC: 4.3 MMOL/L (ref 3.5–5.1)
RBC # BLD: 4.65 M/UL (ref 4–5.2)
SODIUM BLD-SCNC: 138 MMOL/L (ref 136–145)
TOTAL PROTEIN: 7.4 G/DL (ref 6.4–8.2)
TRIGL SERPL-MCNC: 79 MG/DL (ref 0–150)
TSH SERPL DL<=0.05 MIU/L-ACNC: 1.29 UIU/ML (ref 0.27–4.2)
VLDLC SERPL CALC-MCNC: 16 MG/DL
WBC # BLD: 6.8 K/UL (ref 4–11)

## 2019-06-03 RX ORDER — AMITRIPTYLINE HYDROCHLORIDE 25 MG/1
TABLET, FILM COATED ORAL
Qty: 90 TABLET | Refills: 0 | Status: ON HOLD | OUTPATIENT
Start: 2019-06-03 | End: 2019-07-05 | Stop reason: HOSPADM

## 2019-06-11 ENCOUNTER — OFFICE VISIT (OUTPATIENT)
Dept: INTERNAL MEDICINE CLINIC | Age: 79
End: 2019-06-11
Payer: MEDICARE

## 2019-06-11 VITALS
TEMPERATURE: 97.6 F | OXYGEN SATURATION: 98 % | WEIGHT: 164 LBS | SYSTOLIC BLOOD PRESSURE: 138 MMHG | HEIGHT: 62 IN | HEART RATE: 77 BPM | BODY MASS INDEX: 30.18 KG/M2 | DIASTOLIC BLOOD PRESSURE: 82 MMHG

## 2019-06-11 DIAGNOSIS — I48.20 CHRONIC ATRIAL FIBRILLATION (HCC): ICD-10-CM

## 2019-06-11 DIAGNOSIS — I50.32 CHRONIC DIASTOLIC CONGESTIVE HEART FAILURE (HCC): ICD-10-CM

## 2019-06-11 DIAGNOSIS — R73.9 HYPERGLYCEMIA: ICD-10-CM

## 2019-06-11 DIAGNOSIS — H65.22 LEFT CHRONIC SEROUS OTITIS MEDIA: Primary | ICD-10-CM

## 2019-06-11 DIAGNOSIS — K27.9 PUD (PEPTIC ULCER DISEASE): ICD-10-CM

## 2019-06-11 DIAGNOSIS — E78.2 MIXED HYPERLIPIDEMIA: ICD-10-CM

## 2019-06-11 LAB — HBA1C MFR BLD: 6.2 %

## 2019-06-11 PROCEDURE — 4040F PNEUMOC VAC/ADMIN/RCVD: CPT | Performed by: INTERNAL MEDICINE

## 2019-06-11 PROCEDURE — G8399 PT W/DXA RESULTS DOCUMENT: HCPCS | Performed by: INTERNAL MEDICINE

## 2019-06-11 PROCEDURE — G8598 ASA/ANTIPLAT THER USED: HCPCS | Performed by: INTERNAL MEDICINE

## 2019-06-11 PROCEDURE — 83036 HEMOGLOBIN GLYCOSYLATED A1C: CPT | Performed by: INTERNAL MEDICINE

## 2019-06-11 PROCEDURE — 1036F TOBACCO NON-USER: CPT | Performed by: INTERNAL MEDICINE

## 2019-06-11 PROCEDURE — 1123F ACP DISCUSS/DSCN MKR DOCD: CPT | Performed by: INTERNAL MEDICINE

## 2019-06-11 PROCEDURE — 1090F PRES/ABSN URINE INCON ASSESS: CPT | Performed by: INTERNAL MEDICINE

## 2019-06-11 PROCEDURE — 99214 OFFICE O/P EST MOD 30 MIN: CPT | Performed by: INTERNAL MEDICINE

## 2019-06-11 PROCEDURE — G8417 CALC BMI ABV UP PARAM F/U: HCPCS | Performed by: INTERNAL MEDICINE

## 2019-06-11 PROCEDURE — G8427 DOCREV CUR MEDS BY ELIG CLIN: HCPCS | Performed by: INTERNAL MEDICINE

## 2019-06-11 RX ORDER — FLUTICASONE PROPIONATE 50 MCG
2 SPRAY, SUSPENSION (ML) NASAL DAILY
Qty: 3 BOTTLE | Refills: 1 | Status: ON HOLD | OUTPATIENT
Start: 2019-06-11 | End: 2019-07-03

## 2019-06-11 RX ORDER — AZITHROMYCIN 250 MG/1
250 TABLET, FILM COATED ORAL SEE ADMIN INSTRUCTIONS
Qty: 6 TABLET | Refills: 0 | Status: SHIPPED | OUTPATIENT
Start: 2019-06-11 | End: 2019-06-16

## 2019-06-11 NOTE — PROGRESS NOTES
SUBJECTIVE:  Shawnee Pandya is a 66 y.o. female being evaluated for:    Chief Complaint   Patient presents with    Hyperlipidemia     3 month follow up       HPI   Atrial fibrillation  Improved  Rare palpitions   No CHest pain  Swelling better Cutting down on her sodium  Cooking her own chicken     For last month or 2 intermittent pain and clicking in her left ear  Decreased hearing in her left ear    Having repeat endoscopy and colonoscopy. She was admitted to the hospital in January and found was found to have peptic ulcer disease requiring transfusion. She remains on proton pump inhibitors. She denies any bleeding stomach pain increasing heartburn. Allergies   Allergen Reactions    Lisinopril      cough     Current Outpatient Medications   Medication Sig Dispense Refill    fluticasone (FLONASE) 50 MCG/ACT nasal spray 2 sprays by Each Nostril route daily 3 Bottle 1    amitriptyline (ELAVIL) 25 MG tablet TAKE ONE TABLET BY MOUTH ONCE DAILY IN THE EVENING 90 tablet 0    atorvastatin (LIPITOR) 20 MG tablet TAKE ONE TABLET BY MOUTH ONCE DAILY 90 tablet 0    ELIQUIS 5 MG TABS tablet TAKE ONE TABLET BY MOUTH TWICE A DAY 60 tablet 3    pantoprazole (PROTONIX) 40 MG tablet Take 1 tablet by mouth 2 times daily (before meals) 60 tablet 3    aspirin 81 MG tablet Take 81 mg by mouth daily      diltiazem (CARDIZEM CD) 360 MG extended release capsule Take 1 capsule by mouth daily 90 capsule 1    furosemide (LASIX) 40 MG tablet 2 pills daily for 5 days then to go back down to 1 pill daily (Patient taking differently: Take 40 mg by mouth daily 2 pills daily for 5 days then to go back down to 1 pill daily) 30 tablet 5    Coenzyme Q10 10 MG CAPS Take by mouth daily      bisoprolol (ZEBETA) 10 MG tablet Take 10 mg by mouth daily       fish oil-omega-3 fatty acids 1000 MG capsule Take 1,400 g by mouth daily       calcium-vitamin D (OSCAL) 250-125 MG-UNIT per tablet Take 1 tablet by mouth 2 times daily.         multivitamin (THERAGRAN) per tablet Take 1 tablet by mouth daily.  albuterol sulfate HFA (PROAIR HFA) 108 (90 Base) MCG/ACT inhaler Inhale 2 puffs into the lungs See Admin Instructions Use every 4-6 hours while awake for 7-10 days then 4- 6 hours PRN wheezing, cough and/or congestion. Dispense with SPACER and Instruct on use 1 Inhaler 1     No current facility-administered medications for this visit. Social History     Socioeconomic History    Marital status:       Spouse name: Not on file    Number of children: Not on file    Years of education: Not on file    Highest education level: Not on file   Occupational History    Not on file   Social Needs    Financial resource strain: Not on file    Food insecurity:     Worry: Not on file     Inability: Not on file    Transportation needs:     Medical: Not on file     Non-medical: Not on file   Tobacco Use    Smoking status: Never Smoker    Smokeless tobacco: Never Used   Substance and Sexual Activity    Alcohol use: Yes     Comment: very rare    Drug use: No    Sexual activity: Not Currently   Lifestyle    Physical activity:     Days per week: Not on file     Minutes per session: Not on file    Stress: Not on file   Relationships    Social connections:     Talks on phone: Not on file     Gets together: Not on file     Attends Sabianist service: Not on file     Active member of club or organization: Not on file     Attends meetings of clubs or organizations: Not on file     Relationship status: Not on file    Intimate partner violence:     Fear of current or ex partner: Not on file     Emotionally abused: Not on file     Physically abused: Not on file     Forced sexual activity: Not on file   Other Topics Concern    Not on file   Social History Narrative    Not on file      Past Medical History:   Diagnosis Date    At[facility] fibrillation ([facility] [address]     Bleeding ulcer 01/2019    Fibromyalgia     History of blood transfusion 01/2019 2 units    Hyperlipidemia     Hypertension     Osteoporosis     RLS (restless legs syndrome)     Shingles     Stroke ([facility] [address] 2004    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     Past Surgical History:   Procedure Laterality Date    BREAST SURGERY Left     lumpectomy    COLONOSCOPY N/A 6/18/2019    COLONOSCOPY POLYPECTOMY COLD BIOPSY performed by Denney Fleischer, MD at [address]      from left breast    CYST REMOVAL  9/07    scalp cyst excision    HYSTERECTOMY  age 29    OVARIAN CYST REMOVAL Right     Age 24    UPPER GASTROINTESTINAL ENDOSCOPY N/A 1/16/2019    EGD ESOPHAGOGASTRODUODENOSCOPY performed by Denney Fleischer, MD at [facility] 6/18/2019    EGD BIOPSY performed by Denney Fleischer, MD at [address] [place] [address] of Systems   Constitutional: Negative for activity change (emains active working for [employer]), fatigue and unexpected weight change. HENT: Positive for ear pain and hearing loss. Eyes: Negative for visual disturbance. Respiratory: Negative for cough and shortness of breath. Cardiovascular: Positive for palpitations (Infrequent) and leg swelling (Mild). Negative for chest pain. Gastrointestinal: Negative for abdominal pain, blood in stool, diarrhea, nausea and vomiting. No heartburn   Genitourinary: Negative for dysuria. Neurological: Negative for dizziness, syncope, speech difficulty, weakness, light-headedness, numbness and headaches. OBJECTIVE:  /82 (Site: Left Upper Arm, Position: Sitting, Cuff Size: Medium Adult)   Pulse 77   Temp 97.6 °F (36.4 °C) (Oral)   Ht 5' 2\" (1.575 m)   Wt 164 lb (74.4 kg)   SpO2 98%   BMI 30.00 kg/m²      Body mass index is 30 kg/m². Physical Exam   Constitutional: She appears well-developed and well-nourished. No distress. HENT:   Head: Normocephalic and atraumatic.    Right Ear: External ear normal.   Left Ear: External ear normal.   Mouth/Throat: Oropharynx is clear and moist.   Fluid in left tympanic membrane   Eyes: Conjunctivae are normal.   Neck: Neck supple. No JVD present. No thyromegaly present. Cardiovascular: Normal heart sounds. Exam reveals no gallop and no friction rub. No murmur heard. Irregularly irregular rhythm mildly elevated rate   Pulmonary/Chest: Effort normal. She has no wheezes. She has no rales. She exhibits no tenderness. Abdominal: Soft. She exhibits no distension. There is no tenderness. No hepatosplenomegaly   Musculoskeletal: She exhibits edema (Trace). Lymphadenopathy:     She has no cervical adenopathy. Neurological: Coordination normal.   Skin: Skin is warm and dry. No erythema. Psychiatric: She has a normal mood and affect. Her behavior is normal.   Mildly anxious       ASSESSMENT/PLAN:    Chicho Ramirez was seen today for hyperlipidemia. Diagnoses and all orders for this visit:    Left chronic serous otitis media  -     fluticasone (FLONASE) 50 MCG/ACT nasal spray; 2 sprays by Each Nostril route daily  -     azithromycin (ZITHROMAX) 250 MG tablet; Take 1 tablet by mouth See Admin Instructions for 5 days 500mg on day 1 followed by 250mg on days 2 - 5    Chronic atrial fibrillation (HCC) rate controlled with beta-blockers and diltiazem and on Eliquis for anticoagulation    PUD (peptic ulcer disease) remains on pantoprazole.   Has upcoming endoscopy    Chronic diastolic congestive heart failure (HCC)    Mixed hyperlipidemia recent cholesterol good at 177 with an HDL of 66 and LDL of 95    Prediabetes/Hyperglycemia to continue her diet low-carb weight loss and remain active  -     POCT glycosylated hemoglobin (Hb A1C)        Orders Placed This Encounter   Medications    fluticasone (FLONASE) 50 MCG/ACT nasal spray     Si sprays by Each Nostril route daily     Dispense:  3 Bottle     Refill:  1    azithromycin (ZITHROMAX) 250 MG tablet     Sig: Take 1 tablet by mouth See Admin Instructions for 5 days 500mg on day 1 followed by 250mg on days 2 - 5     Dispense:  6 tablet     Refill:  0        Return in about 6 months (around 12/11/2019), or if symptoms worsen or fail to improve. There are no Patient Instructions on file for this visit.

## 2019-06-14 NOTE — PROGRESS NOTES
[facility] time_0830___________        Surgery time_1000___________    Take the following medications with a sip of water:  As prescribed    Do not eat or drink anything after 12:00 midnight prior to your surgery. This includes water chewing gum, mints and ice chips. You may brush your teeth and gargle the morning of your surgery, but do not swallow the water     Please see your family doctor/pediatrician for a history and physical and/or concerning medications. Bring any test results/reports from your physicians office. If you are under the care of a heart doctor or specialist doctor, please be aware that you may be asked to them for clearance    You may be asked to stop blood thinners such as Coumadin, Plavix, Fragmin, Lovenox, etc., or any anti-inflammatories such as:  Aspirin, Ibuprofen, Advil, Naproxen prior to your surgery. We also ask that you stop any OTC medications such as fish oil, vitamin E, glucosamine, garlic, Multivitamins, COQ 10, etc.    We ask that you do not smoke 24 hours prior to surgery  We ask that you do not  drink any alcoholic beverages 24 hours prior to surgery     You must make arrangements for a responsible adult to take you home after your surgery. For your safety you will not be allowed to leave alone or drive yourself home. Your surgery will be cancelled if you do not have a ride home. Also for your safety, it is strongly suggested that someone stay with you the first 24 hours after your surgery. A parent or legal guardian must accompany a child scheduled for surgery and plan to stay at the hospital until the child is discharged. Please do not bring other children with you. For your comfort, please wear simple loose fitting clothing to the hospital.  Please do not bring valuables.     Do not wear any make-up or nail polish on your fingers or toes      For your safety, please do not wear any jewelry or body piercing's on the day of surgery. All jewelry must be removed. If you have dentures, they will be removed before going to operating room. For your convenience, we will provide you with a container. If you wear contact lenses or glasses, they will be removed, please bring a case for them. If you have a living will and a durable power of  for healthcare, please bring in a copy. As part of our patient safety program to minimize surgical site infections, we ask you to do the following:    · Please notify your surgeon if you develop any illness between         now and the  day of your surgery. · This includes a cough, cold, fever, sore throat, nausea,         or vomiting, and diarrhea, etc.  ·  Please notify your surgeon if you experience dizziness, shortness         of breath or blurred vision between now and the time of your surgery. Do not shave your operative site 96 hours prior to surgery. For face and neck surgery, men may use an electric razor 48 hours   prior to surgery. You may shower the night before surgery or the morning of   your surgery with an antibacterial soap. You will need to bring a photo ID and insurance card    [facility] has an onsite pharmacy, would you like to utilize our pharmacy     If you will be staying overnight and use a C-pap machine, please bring   your C-pap to hospital     Our goal is to provide you with excellent care, therefore, visitors will be limited to two(2) in the room at a time so that we may focus on providing this care for you. Please contact pre-admission testing if you have any further questions. [facility] phone number:  [phone] [facility] fax number:  [phone]  Please note these are generalized instructions for all surgical cases, you may be provided with more specific instructions according to your surgery.

## 2019-06-17 ENCOUNTER — ANESTHESIA EVENT (OUTPATIENT)
Dept: ENDOSCOPY | Age: 79
End: 2019-06-17
Payer: MEDICARE

## 2019-06-18 ENCOUNTER — ANESTHESIA (OUTPATIENT)
Dept: ENDOSCOPY | Age: 79
End: 2019-06-18
Payer: MEDICARE

## 2019-06-18 ENCOUNTER — HOSPITAL ENCOUNTER (OUTPATIENT)
Age: 79
Setting detail: OUTPATIENT SURGERY
Discharge: HOME | End: 2019-06-18
Attending: INTERNAL MEDICINE | Admitting: INTERNAL MEDICINE
Payer: MEDICARE

## 2019-06-18 VITALS
OXYGEN SATURATION: 98 % | DIASTOLIC BLOOD PRESSURE: 74 MMHG | RESPIRATION RATE: 14 BRPM | HEIGHT: 62 IN | BODY MASS INDEX: 30.18 KG/M2 | SYSTOLIC BLOOD PRESSURE: 136 MMHG | TEMPERATURE: 97 F | WEIGHT: 164 LBS | HEART RATE: 68 BPM

## 2019-06-18 VITALS
OXYGEN SATURATION: 95 % | TEMPERATURE: 98.6 F | SYSTOLIC BLOOD PRESSURE: 116 MMHG | DIASTOLIC BLOOD PRESSURE: 71 MMHG | RESPIRATION RATE: 27 BRPM

## 2019-06-18 DIAGNOSIS — Z12.11 SCREEN FOR COLON CANCER: ICD-10-CM

## 2019-06-18 PROCEDURE — 2580000003 HC RX 258: Performed by: NURSE ANESTHETIST, CERTIFIED REGISTERED

## 2019-06-18 PROCEDURE — 6360000002 HC RX W HCPCS: Performed by: NURSE ANESTHETIST, CERTIFIED REGISTERED

## 2019-06-18 PROCEDURE — 2580000003 HC RX 258: Performed by: ANESTHESIOLOGY

## 2019-06-18 PROCEDURE — 7100000011 HC PHASE II RECOVERY - ADDTL 15 MIN: Performed by: INTERNAL MEDICINE

## 2019-06-18 PROCEDURE — 7100000010 HC PHASE II RECOVERY - FIRST 15 MIN: Performed by: INTERNAL MEDICINE

## 2019-06-18 PROCEDURE — 7100000001 HC PACU RECOVERY - ADDTL 15 MIN: Performed by: INTERNAL MEDICINE

## 2019-06-18 PROCEDURE — 3700000000 HC ANESTHESIA ATTENDED CARE: Performed by: INTERNAL MEDICINE

## 2019-06-18 PROCEDURE — 88305 TISSUE EXAM BY PATHOLOGIST: CPT

## 2019-06-18 PROCEDURE — 2500000003 HC RX 250 WO HCPCS: Performed by: NURSE ANESTHETIST, CERTIFIED REGISTERED

## 2019-06-18 PROCEDURE — 7100000000 HC PACU RECOVERY - FIRST 15 MIN: Performed by: INTERNAL MEDICINE

## 2019-06-18 PROCEDURE — 3700000001 HC ADD 15 MINUTES (ANESTHESIA): Performed by: INTERNAL MEDICINE

## 2019-06-18 PROCEDURE — 3609012400 HC EGD TRANSORAL BIOPSY SINGLE/MULTIPLE: Performed by: INTERNAL MEDICINE

## 2019-06-18 PROCEDURE — 2709999900 HC NON-CHARGEABLE SUPPLY: Performed by: INTERNAL MEDICINE

## 2019-06-18 PROCEDURE — 3609010600 HC COLONOSCOPY POLYPECTOMY SNARE/COLD BIOPSY: Performed by: INTERNAL MEDICINE

## 2019-06-18 RX ORDER — PROPOFOL 10 MG/ML
INJECTION, EMULSION INTRAVENOUS PRN
Status: DISCONTINUED | OUTPATIENT
Start: 2019-06-18 | End: 2019-06-18 | Stop reason: SDUPTHER

## 2019-06-18 RX ORDER — ONDANSETRON 2 MG/ML
4 INJECTION INTRAMUSCULAR; INTRAVENOUS
Status: DISCONTINUED | OUTPATIENT
Start: 2019-06-18 | End: 2019-06-18 | Stop reason: HOSPADM

## 2019-06-18 RX ORDER — FENTANYL CITRATE 50 UG/ML
25 INJECTION, SOLUTION INTRAMUSCULAR; INTRAVENOUS EVERY 5 MIN PRN
Status: DISCONTINUED | OUTPATIENT
Start: 2019-06-18 | End: 2019-06-18 | Stop reason: HOSPADM

## 2019-06-18 RX ORDER — SODIUM CHLORIDE 9 MG/ML
INJECTION, SOLUTION INTRAVENOUS CONTINUOUS
Status: DISCONTINUED | OUTPATIENT
Start: 2019-06-18 | End: 2019-06-18 | Stop reason: HOSPADM

## 2019-06-18 RX ORDER — PROPOFOL 10 MG/ML
INJECTION, EMULSION INTRAVENOUS CONTINUOUS PRN
Status: DISCONTINUED | OUTPATIENT
Start: 2019-06-18 | End: 2019-06-18 | Stop reason: SDUPTHER

## 2019-06-18 RX ORDER — MORPHINE SULFATE 2 MG/ML
1 INJECTION, SOLUTION INTRAMUSCULAR; INTRAVENOUS EVERY 5 MIN PRN
Status: DISCONTINUED | OUTPATIENT
Start: 2019-06-18 | End: 2019-06-18 | Stop reason: HOSPADM

## 2019-06-18 RX ORDER — LIDOCAINE HYDROCHLORIDE 20 MG/ML
INJECTION, SOLUTION INFILTRATION; PERINEURAL PRN
Status: DISCONTINUED | OUTPATIENT
Start: 2019-06-18 | End: 2019-06-18 | Stop reason: SDUPTHER

## 2019-06-18 RX ORDER — OXYCODONE HYDROCHLORIDE 5 MG/1
10 TABLET ORAL PRN
Status: DISCONTINUED | OUTPATIENT
Start: 2019-06-18 | End: 2019-06-18 | Stop reason: HOSPADM

## 2019-06-18 RX ORDER — SODIUM CHLORIDE 0.9 % (FLUSH) 0.9 %
10 SYRINGE (ML) INJECTION PRN
Status: DISCONTINUED | OUTPATIENT
Start: 2019-06-18 | End: 2019-06-18 | Stop reason: HOSPADM

## 2019-06-18 RX ORDER — FENTANYL CITRATE 50 UG/ML
50 INJECTION, SOLUTION INTRAMUSCULAR; INTRAVENOUS EVERY 5 MIN PRN
Status: DISCONTINUED | OUTPATIENT
Start: 2019-06-18 | End: 2019-06-18 | Stop reason: HOSPADM

## 2019-06-18 RX ORDER — MEPERIDINE HYDROCHLORIDE 25 MG/ML
12.5 INJECTION INTRAMUSCULAR; INTRAVENOUS; SUBCUTANEOUS EVERY 5 MIN PRN
Status: DISCONTINUED | OUTPATIENT
Start: 2019-06-18 | End: 2019-06-18 | Stop reason: HOSPADM

## 2019-06-18 RX ORDER — SODIUM CHLORIDE 0.9 % (FLUSH) 0.9 %
10 SYRINGE (ML) INJECTION EVERY 12 HOURS SCHEDULED
Status: DISCONTINUED | OUTPATIENT
Start: 2019-06-18 | End: 2019-06-18 | Stop reason: HOSPADM

## 2019-06-18 RX ORDER — MORPHINE SULFATE 2 MG/ML
2 INJECTION, SOLUTION INTRAMUSCULAR; INTRAVENOUS EVERY 5 MIN PRN
Status: DISCONTINUED | OUTPATIENT
Start: 2019-06-18 | End: 2019-06-18 | Stop reason: HOSPADM

## 2019-06-18 RX ORDER — OXYCODONE HYDROCHLORIDE 5 MG/1
5 TABLET ORAL PRN
Status: DISCONTINUED | OUTPATIENT
Start: 2019-06-18 | End: 2019-06-18 | Stop reason: HOSPADM

## 2019-06-18 RX ORDER — SODIUM CHLORIDE 9 MG/ML
INJECTION, SOLUTION INTRAVENOUS CONTINUOUS PRN
Status: DISCONTINUED | OUTPATIENT
Start: 2019-06-18 | End: 2019-06-18 | Stop reason: SDUPTHER

## 2019-06-18 RX ADMIN — PROPOFOL 150 MG: 10 INJECTION, EMULSION INTRAVENOUS at 10:18

## 2019-06-18 RX ADMIN — SODIUM CHLORIDE: 9 INJECTION, SOLUTION INTRAVENOUS at 10:18

## 2019-06-18 RX ADMIN — PROPOFOL 20 MG: 10 INJECTION, EMULSION INTRAVENOUS at 10:48

## 2019-06-18 RX ADMIN — SODIUM CHLORIDE: 9 INJECTION, SOLUTION INTRAVENOUS at 10:09

## 2019-06-18 RX ADMIN — PROPOFOL 180 MCG/KG/MIN: 10 INJECTION, EMULSION INTRAVENOUS at 10:18

## 2019-06-18 RX ADMIN — LIDOCAINE HYDROCHLORIDE 50 MG: 20 INJECTION, SOLUTION INFILTRATION; PERINEURAL at 10:18

## 2019-06-18 ASSESSMENT — PULMONARY FUNCTION TESTS
PIF_VALUE: 0

## 2019-06-18 ASSESSMENT — PAIN SCALES - GENERAL
PAINLEVEL_OUTOF10: 0

## 2019-06-18 ASSESSMENT — PAIN - FUNCTIONAL ASSESSMENT: PAIN_FUNCTIONAL_ASSESSMENT: 0-10

## 2019-06-18 ASSESSMENT — ENCOUNTER SYMPTOMS: SHORTNESS OF BREATH: 1

## 2019-06-18 NOTE — ANESTHESIA POSTPROCEDURE EVALUATION
Department of Anesthesiology  Postprocedure Note    Patient: Chrystie Hodgkins  MRN: [id number]  YOB: 1940  Date of evaluation: 6/18/2019  Time:  1:25 PM     Procedure Summary     Date:  06/18/19 Room / Location:  [facility] 04 / [facility] ENDOSCOPY    Anesthesia Start:  1017 Anesthesia Stop:  9508    Procedures:       EGD BIOPSY (N/A )      COLONOSCOPY POLYPECTOMY COLD BIOPSY (N/A ) Diagnosis:       Screen for colon cancer      (GASTRIC ULCER, SCREEN FOR COLON CANCER)    Surgeon:  Peggy Wu MD Responsible Provider:  Yolanda Posey MD    Anesthesia Type:  TIVA, MAC ASA Status:  3          Anesthesia Type: TIVA, MAC    Jessa Phase I: Jessa Score: 10    Jessa Phase II: Jessa Score: 10    Last vitals: Reviewed and per EMR flowsheets.        Anesthesia Post Evaluation    Patient location during evaluation: PACU  Patient participation: complete - patient participated  Level of consciousness: awake and alert  Airway patency: patent  Nausea & Vomiting: no nausea and no vomiting  Complications: no  Cardiovascular status: hemodynamically stable  Respiratory status: acceptable  Hydration status: stable

## 2019-06-18 NOTE — OP NOTE
Endoscopy Note    Patient: Fredo Kenny  : 1940  Acct#:     Procedure: Esophagogastroduodenoscopy with biopsy                       Colonoscopy with biopsy, polypectomy (cold biopsy)    Date:  2019     Surgeon:   Yuan Vazquez MD    Referring Physician:  Lexx Lawton MD    Indications: The patient is a 66 y.o. female  who presents for EGD and colonoscopy due to history of gastric ulcer, and colon cancer screening. Postoperative Diagnosis:   1. Mild gastric antral erythema. Biopsies were obtained from the gastric antrum, incisura and body to evaluate for H. Pylori. 2. No evidence of residual pyloric channel ulcer. 3. 3 mm sessile cecal polyp, resected with biopsy forceps. 4. 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 Bite-on-bite biopsies were obtained. 5. Sigmoid diverticulosis. 6. Erythematous, ulcerated distal rectal mucosa without obvious mass lesion. Biopsies were obtained to evaluate etiology. Anesthesia:  MAC    Consent:  The patient or their legal guardian has signed a consent, and is aware of the potential risks, benefits, alternatives, and potential complications of this procedure. These include, but are not limited to hemorrhage, bleeding, post procedural pain, perforation, phlebitis, aspiration, hypotension, hypoxia, cardiovascular events such as arryhthmia, and possibly death. Additionally, the possibility of missed colonic polyps and interval colon cancer was discussed in the consent. Description of Procedure: The patient was then taken to the endoscopy suite, placed in the left lateral decubitus position and the above IV sedation was administrered. The Olympus video endoscope was placed through the patient's oropharynx without difficulty to the extent of the 2nd portion of the duodenum. Both forward and retroflexed views of the stomach were obtained. Findings:    Esophagus:  The esophagus appeared normal without evidence of Issa's esophagus or reflux esophagitis. Stomach: The stomach was viewed on forward and retroflexed views. A subtle sliding hiatal hernia was present. Additionally, mild gastric antral erythema was present. Biopsies were obtained from the gastric antrum, incisura and body to evaluate for H. Pylori. There was no evidence of residual plyroic channel ulcer. Duodenum: The first and 2nd portions of the duodenum appeared normal with normal villous pattern. The scope was then withdrawn back into the stomach, it was decompressed, and the scope was completely withdrawn. A digital rectal examination was performed and revealed negative without mass, lesions or tenderness. The Olympus video colonoscope was placed in the patient's rectum under digital direction and advanced to the cecum. The cecum was identified by characteristic anatomy and ballottment. The prep was fair. A large volume of food debris and stool was present and routinely clogged the scope, interfering with visualization. The ileocecal valve was identified. The terminal ileum was not inspected. The scope was then withdrawn back through the cecum, ascending, transverse, descending, sigmoid colon, and rectum. Careful circumferential examination of the mucosa in these areas demonstrated a 3 mm sessile cecal polyp, resected with biopsy forceps. A 2.5 cm pedunculated lipoma was present in the transverse colon, with positive pillow sign. Bite-on-bite biopsies were obtained. Moderately severe sigmoid diverticulosis was present with some hamilton-diverticular edema and erythema. The scope was then withdrawn into the rectum and retroflexed. The retroflexed view of the anal verge and rectum demonstrates erythematous, ulcerated distal rectal mucosa without obvious mass lesion. Biopsies were obtained to evaluate etiology. .  The scope was straightened, the colon was decompressed and the scope was withdrawn from the patient.   The

## 2019-06-18 NOTE — ANESTHESIA PRE PROCEDURE
Department of Anesthesiology  Preprocedure Note       Name:  Ebony Lee   Age:  66 y.o.  :  1940                                          MRN:  [id number]         Date:  2019      Surgeon: Brionna Walton):  Lisa Hager MD    Procedure: ESOPHAGOGASTRODUODENOSCOPY (N/A )  COLONOSCOPY (N/A )    Medications prior to admission:   Prior to Admission medications    Medication Sig Start Date End Date Taking? Authorizing Provider   fluticasone (FLONASE) 50 MCG/ACT nasal spray 2 sprays by Each Nostril route daily 19   Imtiaz New MD   amitriptyline (ELAVIL) 25 MG tablet TAKE ONE TABLET BY MOUTH ONCE DAILY IN THE EVENING 6/3/19   Imtiaz New MD   atorvastatin (LIPITOR) 20 MG tablet TAKE ONE TABLET BY MOUTH ONCE DAILY 19   Imtiaz New MD   ELIQUIS 5 MG TABS tablet TAKE ONE TABLET BY MOUTH TWICE A DAY 19   Imtiaz New MD   pantoprazole (PROTONIX) 40 MG tablet Take 1 tablet by mouth 2 times daily (before meals) 19   Imtiaz New MD   aspirin 81 MG tablet Take 81 mg by mouth daily    Historical Provider, MD   diltiazem (CARDIZEM CD) 360 MG extended release capsule Take 1 capsule by mouth daily 19   Imtiaz New MD   furosemide (LASIX) 40 MG tablet 2 pills daily for 5 days then to go back down to 1 pill daily  Patient taking differently: Take 40 mg by mouth daily 2 pills daily for 5 days then to go back down to 1 pill daily 19   Imtiaz New MD   Coenzyme Q10 10 MG CAPS Take by mouth daily    Historical Provider, MD   bisoprolol (ZEBETA) 10 MG tablet Take 10 mg by mouth daily  11/15/18   Historical Provider, MD   albuterol sulfate HFA (PROAIR HFA) 108 (90 Base) MCG/ACT inhaler Inhale 2 puffs into the lungs See Admin Instructions Use every 4-6 hours while awake for 7-10 days then 4- 6 hours PRN wheezing, cough and/or congestion.   Dispense with SPACER and Instruct on use 19   Faustino Underwood MD   fish oil-omega-3 fatty acids 1000 MG capsule Take 1,400 g by mouth daily     Historical Provider, MD   calcium-vitamin D (OSCAL) 250-125 MG-UNIT per tablet Take 1 tablet by mouth 2 times daily. Historical Provider, MD   multivitamin [facility]) per tablet Take 1 tablet by mouth daily. Historical Provider, MD       Current medications:    No current facility-administered medications for this visit. No current outpatient medications on file. Facility-Administered Medications Ordered in Other Visits   Medication Dose Route Frequency Provider Last Rate Last Dose    0.9 % sodium chloride infusion   Intravenous Continuous Renetta Barrera MD        sodium chloride flush 0.9 % injection 10 mL  10 mL Intravenous 2 times per day Renetta Barrera MD        sodium chloride flush 0.9 % injection 10 mL  10 mL Intravenous PRN Renetta Barrera MD           Allergies:     Allergies   Allergen Reactions    Lisinopril      cough       Problem List:    Patient Active Problem List   Diagnosis Code    Stroke ([facility] I63.9    Hyperlipidemia E78.5    Hypertension I10    Fibromyalgia M79.7    RLS (restless legs syndrome) G25.81    Prediabetes R73.03    Cellulitis of external ear H60.10    Osteopenia M85.80    Palpitations R00.2    Precordial pain R07.2    SOB (shortness of breath) R06.02    Closed nondisplaced fracture of distal phalanx of left thumb with routine healing S62.525D    Near syncope R55    SVT (supraventricular tachycardia) ([facility]) I47.1    Atrial fibrillation with RVR ([facility]) I48.91    Chronic atrial fibrillation ([facility]) I48.2    GI bleed K92.2    Acute blood loss anemia D62    Pneumonia J18.9    SIRS (systemic inflammatory response syndrome) ([facility]) R65.10    Chronic diastolic congestive heart failure ([facility]) I50.32    PUD (peptic ulcer disease) K27.9       Past Medical History:        Diagnosis Date    [facility] fibrillation ([facility]     Bleeding ulcer 01/2019    Fibromyalgia     History of blood transfusion 01/2019    2 units    Hyperlipidemia     Hypertension     Osteoporosis     RLS (restless legs syndrome)     Shingles     Stroke ([facility] 2004    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       Past Surgical History:        Procedure Laterality Date    BREAST SURGERY Left     lumpectomy    CYST REMOVAL      from left breast    CYST REMOVAL  9/07    scalp cyst excision    HYSTERECTOMY  age 29    OVARIAN CYST REMOVAL Right     Age 24   Chelle Greenfield UPPER GASTROINTESTINAL ENDOSCOPY N/A 1/16/2019    EGD ESOPHAGOGASTRODUODENOSCOPY performed by Armen Simons MD at [facility] History:    Social History     Tobacco Use    Smoking status: Never Smoker    Smokeless tobacco: Never Used   Substance Use Topics    Alcohol use: Yes     Comment: very rare                                Counseling given: Not Answered      Vital Signs (Current): There were no vitals filed for this visit.                                            BP Readings from Last 3 Encounters:   06/11/19 138/82   03/11/19 102/64   01/28/19 130/62       NPO Status:                                                                                 BMI:   Wt Readings from Last 3 Encounters:   06/14/19 164 lb (74.4 kg)   06/11/19 164 lb (74.4 kg)   03/11/19 163 lb (73.9 kg)     There is no height or weight on file to calculate BMI.    CBC:   Lab Results   Component Value Date    WBC 6.8 06/01/2019    RBC 4.65 06/01/2019    HGB 12.1 06/01/2019    HCT 38.5 06/01/2019    MCV 82.8 06/01/2019    RDW 16.4 06/01/2019     06/01/2019       CMP:   Lab Results   Component Value Date     06/01/2019    K 4.3 06/01/2019    K 4.1 01/16/2019    CL 98 06/01/2019    CO2 25 06/01/2019    BUN 41 06/01/2019    CREATININE 1.0 06/01/2019    GFRAA >60 06/01/2019    GFRAA >60 04/13/2013    AGRATIO 1.6 06/01/2019    LABGLOM 54 06/01/2019    GLUCOSE 119 06/01/2019    PROT 7.4 06/01/2019    PROT 6.6 10/13/2012    CALCIUM 9.8 06/01/2019    BILITOT 0.5 06/01/2019    ALKPHOS 87 06/01/2019 AST 25 06/01/2019    ALT 27 06/01/2019       POC Tests: No results for input(s): POCGLU, POCNA, POCK, POCCL, POCBUN, POCHEMO, POCHCT in the last 72 hours. Coags:   Lab Results   Component Value Date    PROTIME 19.3 01/15/2019    INR 1.69 01/15/2019    APTT 29.0 01/15/2019       HCG (If Applicable): No results found for: PREGTESTUR, PREGSERUM, HCG, HCGQUANT     ABGs: No results found for: PHART, PO2ART, VJA9VOD, HYH3FSS, BEART, Y3XNNEYZ     Type & Screen (If Applicable):  No results found for: LABABO, [address]    Anesthesia Evaluation  Patient summary reviewed no history of anesthetic complications:   Airway: Mallampati: II  TM distance: >3 FB   Neck ROM: full  Mouth opening: > = 3 FB Dental:          Pulmonary: breath sounds clear to auscultation  (+) shortness of breath:      (-) COPD and asthma                           Cardiovascular:  Exercise tolerance: good (>4 METS),   (+) hypertension:,     (-) past MI, CAD and CABG/stent      Rhythm: regular  Rate: normal                    Neuro/Psych:   (+) CVA: residual symptoms,    (-) seizures and TIA           GI/Hepatic/Renal:        (-) GERD, hepatitis, liver disease and no renal disease       Endo/Other:        (-) hypothyroidism               Abdominal:           Vascular:     - DVT and PE. Anesthesia Plan      TIVA and MAC     ASA 3       Induction: intravenous. Anesthetic plan and risks discussed with patient and child/children. Plan discussed with CRNA. DOS STAFF ADDENDUM:    Pt seen and examined, chart reviewed (including anesthesia, drug and allergy history). No interval changes to history and physical examination. Anesthetic plan, risks, benefits, alternatives, and personnel involved discussed with patient. Patient verbalized an understanding and agrees to proceed.       Anny Conroy MD  June 18, 2019  9:39 AM          Anny Conroy MD   6/18/2019

## 2019-06-18 NOTE — H&P
Pre-operative History and Physical    Patient: Kari Zelaya  : 1940  Acct#:     Intended Procedure:  EGD and colonoscopy    HISTORY OF PRESENT ILLNESS:  The patient is a 66 y.o. female  who presents for EGD and colonoscopy due to history of gastric ulcer, and colon cancer screening. 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       Current Outpatient Medications on File Prior to Encounter   Medication Sig Dispense Refill    ELIQUIS 5 MG TABS tablet TAKE ONE TABLET BY MOUTH TWICE A DAY 60 tablet 3    pantoprazole (PROTONIX) 40 MG tablet Take 1 tablet by mouth 2 times daily (before meals) 60 tablet 3    aspirin 81 MG tablet Take 81 mg by mouth daily      diltiazem (CARDIZEM CD) 360 MG extended release capsule Take 1 capsule by mouth daily 90 capsule 1    furosemide (LASIX) 40 MG tablet 2 pills daily for 5 days then to go back down to 1 pill daily (Patient taking differently: Take 40 mg by mouth daily 2 pills daily for 5 days then to go back down to 1 pill daily) 30 tablet 5    Coenzyme Q10 10 MG CAPS Take by mouth daily      bisoprolol (ZEBETA) 10 MG tablet Take 10 mg by mouth daily       fish oil-omega-3 fatty acids 1000 MG capsule Take 1,400 g by mouth daily       calcium-vitamin D (OSCAL) 250-125 MG-UNIT per tablet Take 1 tablet by mouth 2 times daily.  multivitamin (THERAGRAN) per tablet Take 1 tablet by mouth daily.  albuterol sulfate HFA (PROAIR HFA) 108 (90 Base) MCG/ACT inhaler Inhale 2 puffs into the lungs See Admin Instructions Use every 4-6 hours while awake for 7-10 days then 4- 6 hours PRN wheezing, cough and/or congestion. Dispense with SPACER and Instruct on use 1 Inhaler 1        Allergies:  Lisinopril    Social History:   TOBACCO:   reports that she has never smoked. She has never used smokeless tobacco.  ETOH:   reports that she drinks alcohol. DRUGS:   reports that she does not use drugs. PHYSICAL EXAM:      Vital Signs: Ht 5' 2\" (1.575 m)   Wt 164 lb (74.4 kg)   BMI 30.00 kg/m²    Airway: No stridor or wheezing noted. Good air movement  Pulmonary: without wheezes. Clear to auscultation  Cardiac:regular rate and rhythm without loud murmurs  Abdomen:soft, nontender,  Bowel sounds present    Pre-Procedure Assessment / Plan:  1) EGD and colonoscopy    ASA Grade:  ASA 3 - Patient with moderate systemic disease with functional limitations  Mallampati Classification:  Class III    Level of Sedation Plan:MAC    Post Procedure plan: Return to same level of care    I assessed the patient and find that the patient is in satisfactory condition to proceed with the planned procedure and sedation plan. I have explained the risk, benefits, and alternatives to the procedure; the patient understands and agrees to proceed.        Honorio Givens MD  6/18/2019

## 2019-06-22 ASSESSMENT — ENCOUNTER SYMPTOMS
SHORTNESS OF BREATH: 0
VOMITING: 0
DIARRHEA: 0
NAUSEA: 0
COUGH: 0
ABDOMINAL PAIN: 0
BLOOD IN STOOL: 0

## 2019-07-02 ENCOUNTER — HOSPITAL ENCOUNTER (INPATIENT)
Age: 79
LOS: 3 days | Discharge: HOME | DRG: 308 | End: 2019-07-05
Attending: EMERGENCY MEDICINE | Admitting: INTERNAL MEDICINE
Payer: MEDICARE

## 2019-07-02 ENCOUNTER — APPOINTMENT (OUTPATIENT)
Dept: GENERAL RADIOLOGY | Age: 79
DRG: 308 | End: 2019-07-02
Payer: MEDICARE

## 2019-07-02 DIAGNOSIS — I50.33 ACUTE ON CHRONIC DIASTOLIC HEART FAILURE (HCC): ICD-10-CM

## 2019-07-02 DIAGNOSIS — I50.21 ACUTE SYSTOLIC HEART FAILURE (HCC): ICD-10-CM

## 2019-07-02 DIAGNOSIS — I48.91 ATRIAL FIBRILLATION WITH RVR (HCC): Primary | ICD-10-CM

## 2019-07-02 LAB
ANION GAP SERPL CALCULATED.3IONS-SCNC: 14 MMOL/L (ref 3–16)
BASOPHILS ABSOLUTE: 0.1 K/UL (ref 0–0.2)
BASOPHILS RELATIVE PERCENT: 0.9 %
BUN BLDV-MCNC: 35 MG/DL (ref 7–20)
CALCIUM SERPL-MCNC: 9.6 MG/DL (ref 8.3–10.6)
CHLORIDE BLD-SCNC: 100 MMOL/L (ref 99–110)
CO2: 26 MMOL/L (ref 21–32)
CREAT SERPL-MCNC: 1.1 MG/DL (ref 0.6–1.2)
EKG ATRIAL RATE: 153 BPM
EKG DIAGNOSIS: NORMAL
EKG Q-T INTERVAL: 324 MS
EKG QRS DURATION: 116 MS
EKG QTC CALCULATION (BAZETT): 493 MS
EKG R AXIS: 118 DEGREES
EKG T AXIS: 43 DEGREES
EKG VENTRICULAR RATE: 139 BPM
EOSINOPHILS ABSOLUTE: 0.2 K/UL (ref 0–0.6)
EOSINOPHILS RELATIVE PERCENT: 3.1 %
GFR AFRICAN AMERICAN: 58
GFR NON-AFRICAN AMERICAN: 48
GLUCOSE BLD-MCNC: 132 MG/DL (ref 70–99)
HCT VFR BLD CALC: 37.7 % (ref 36–48)
HEMOGLOBIN: 12.3 G/DL (ref 12–16)
LYMPHOCYTES ABSOLUTE: 1.6 K/UL (ref 1–5.1)
LYMPHOCYTES RELATIVE PERCENT: 20.2 %
MCH RBC QN AUTO: 27 PG (ref 26–34)
MCHC RBC AUTO-ENTMCNC: 32.7 G/DL (ref 31–36)
MCV RBC AUTO: 82.6 FL (ref 80–100)
MONOCYTES ABSOLUTE: 0.6 K/UL (ref 0–1.3)
MONOCYTES RELATIVE PERCENT: 7.1 %
NEUTROPHILS ABSOLUTE: 5.5 K/UL (ref 1.7–7.7)
NEUTROPHILS RELATIVE PERCENT: 68.7 %
PDW BLD-RTO: 17.6 % (ref 12.4–15.4)
PLATELET # BLD: 171 K/UL (ref 135–450)
PMV BLD AUTO: 11.9 FL (ref 5–10.5)
POTASSIUM SERPL-SCNC: 4.2 MMOL/L (ref 3.5–5.1)
PRO-BNP: 3094 PG/ML (ref 0–449)
RBC # BLD: 4.56 M/UL (ref 4–5.2)
SODIUM BLD-SCNC: 140 MMOL/L (ref 136–145)
TROPONIN: <0.01 NG/ML
TROPONIN: <0.01 NG/ML
WBC # BLD: 8 K/UL (ref 4–11)

## 2019-07-02 PROCEDURE — 93005 ELECTROCARDIOGRAM TRACING: CPT | Performed by: EMERGENCY MEDICINE

## 2019-07-02 PROCEDURE — 96365 THER/PROPH/DIAG IV INF INIT: CPT

## 2019-07-02 PROCEDURE — 85025 COMPLETE CBC W/AUTO DIFF WBC: CPT

## 2019-07-02 PROCEDURE — 71045 X-RAY EXAM CHEST 1 VIEW: CPT

## 2019-07-02 PROCEDURE — 93010 ELECTROCARDIOGRAM REPORT: CPT | Performed by: INTERNAL MEDICINE

## 2019-07-02 PROCEDURE — 83880 ASSAY OF NATRIURETIC PEPTIDE: CPT

## 2019-07-02 PROCEDURE — 94760 N-INVAS EAR/PLS OXIMETRY 1: CPT

## 2019-07-02 PROCEDURE — 84484 ASSAY OF TROPONIN QUANT: CPT

## 2019-07-02 PROCEDURE — 6360000002 HC RX W HCPCS: Performed by: NURSE PRACTITIONER

## 2019-07-02 PROCEDURE — 6370000000 HC RX 637 (ALT 250 FOR IP): Performed by: NURSE PRACTITIONER

## 2019-07-02 PROCEDURE — 80048 BASIC METABOLIC PNL TOTAL CA: CPT

## 2019-07-02 PROCEDURE — 96366 THER/PROPH/DIAG IV INF ADDON: CPT

## 2019-07-02 PROCEDURE — 6370000000 HC RX 637 (ALT 250 FOR IP): Performed by: INTERNAL MEDICINE

## 2019-07-02 PROCEDURE — 1200000000 HC SEMI PRIVATE

## 2019-07-02 PROCEDURE — 2500000003 HC RX 250 WO HCPCS: Performed by: EMERGENCY MEDICINE

## 2019-07-02 PROCEDURE — 96375 TX/PRO/DX INJ NEW DRUG ADDON: CPT

## 2019-07-02 PROCEDURE — 96376 TX/PRO/DX INJ SAME DRUG ADON: CPT

## 2019-07-02 PROCEDURE — 99285 EMERGENCY DEPT VISIT HI MDM: CPT

## 2019-07-02 PROCEDURE — 99221 1ST HOSP IP/OBS SF/LOW 40: CPT | Performed by: NURSE PRACTITIONER

## 2019-07-02 PROCEDURE — 2580000003 HC RX 258: Performed by: INTERNAL MEDICINE

## 2019-07-02 PROCEDURE — 6360000002 HC RX W HCPCS: Performed by: EMERGENCY MEDICINE

## 2019-07-02 PROCEDURE — 36415 COLL VENOUS BLD VENIPUNCTURE: CPT

## 2019-07-02 PROCEDURE — 2580000003 HC RX 258: Performed by: EMERGENCY MEDICINE

## 2019-07-02 RX ORDER — DILTIAZEM HYDROCHLORIDE 180 MG/1
360 CAPSULE, COATED, EXTENDED RELEASE ORAL DAILY
Status: DISCONTINUED | OUTPATIENT
Start: 2019-07-03 | End: 2019-07-02

## 2019-07-02 RX ORDER — AMITRIPTYLINE HYDROCHLORIDE 25 MG/1
25 TABLET, FILM COATED ORAL NIGHTLY
Status: DISCONTINUED | OUTPATIENT
Start: 2019-07-02 | End: 2019-07-03

## 2019-07-02 RX ORDER — METOPROLOL TARTRATE 5 MG/5ML
5 INJECTION INTRAVENOUS EVERY 8 HOURS PRN
Status: DISCONTINUED | OUTPATIENT
Start: 2019-07-02 | End: 2019-07-05 | Stop reason: HOSPADM

## 2019-07-02 RX ORDER — PANTOPRAZOLE SODIUM 40 MG/1
40 TABLET, DELAYED RELEASE ORAL DAILY
Status: DISCONTINUED | OUTPATIENT
Start: 2019-07-03 | End: 2019-07-05 | Stop reason: HOSPADM

## 2019-07-02 RX ORDER — DILTIAZEM HYDROCHLORIDE 180 MG/1
360 CAPSULE, COATED, EXTENDED RELEASE ORAL DAILY
Status: DISCONTINUED | OUTPATIENT
Start: 2019-07-02 | End: 2019-07-02

## 2019-07-02 RX ORDER — SODIUM CHLORIDE 0.9 % (FLUSH) 0.9 %
10 SYRINGE (ML) INJECTION EVERY 12 HOURS SCHEDULED
Status: DISCONTINUED | OUTPATIENT
Start: 2019-07-02 | End: 2019-07-05 | Stop reason: HOSPADM

## 2019-07-02 RX ORDER — ACETAMINOPHEN 325 MG/1
650 TABLET ORAL EVERY 4 HOURS PRN
Status: DISCONTINUED | OUTPATIENT
Start: 2019-07-02 | End: 2019-07-05 | Stop reason: HOSPADM

## 2019-07-02 RX ORDER — METOPROLOL TARTRATE 50 MG/1
50 TABLET, FILM COATED ORAL 2 TIMES DAILY
Status: DISCONTINUED | OUTPATIENT
Start: 2019-07-03 | End: 2019-07-02

## 2019-07-02 RX ORDER — FUROSEMIDE 40 MG/1
40 TABLET ORAL DAILY
Status: DISCONTINUED | OUTPATIENT
Start: 2019-07-02 | End: 2019-07-02

## 2019-07-02 RX ORDER — ONDANSETRON 2 MG/ML
4 INJECTION INTRAMUSCULAR; INTRAVENOUS EVERY 6 HOURS PRN
Status: DISCONTINUED | OUTPATIENT
Start: 2019-07-02 | End: 2019-07-05 | Stop reason: HOSPADM

## 2019-07-02 RX ORDER — DILTIAZEM HYDROCHLORIDE 5 MG/ML
10 INJECTION INTRAVENOUS ONCE
Status: COMPLETED | OUTPATIENT
Start: 2019-07-02 | End: 2019-07-02

## 2019-07-02 RX ORDER — AMIODARONE HYDROCHLORIDE 200 MG/1
400 TABLET ORAL ONCE
Status: COMPLETED | OUTPATIENT
Start: 2019-07-02 | End: 2019-07-02

## 2019-07-02 RX ORDER — FUROSEMIDE 10 MG/ML
40 INJECTION INTRAMUSCULAR; INTRAVENOUS 2 TIMES DAILY
Status: DISCONTINUED | OUTPATIENT
Start: 2019-07-02 | End: 2019-07-02

## 2019-07-02 RX ORDER — FUROSEMIDE 10 MG/ML
80 INJECTION INTRAMUSCULAR; INTRAVENOUS ONCE
Status: COMPLETED | OUTPATIENT
Start: 2019-07-02 | End: 2019-07-02

## 2019-07-02 RX ORDER — DILTIAZEM HYDROCHLORIDE 180 MG/1
360 CAPSULE, COATED, EXTENDED RELEASE ORAL DAILY
Status: DISCONTINUED | OUTPATIENT
Start: 2019-07-03 | End: 2019-07-03

## 2019-07-02 RX ORDER — METOPROLOL TARTRATE 50 MG/1
50 TABLET, FILM COATED ORAL 2 TIMES DAILY
Status: DISCONTINUED | OUTPATIENT
Start: 2019-07-03 | End: 2019-07-03

## 2019-07-02 RX ORDER — METOPROLOL TARTRATE 5 MG/5ML
5 INJECTION INTRAVENOUS EVERY 8 HOURS
Status: DISCONTINUED | OUTPATIENT
Start: 2019-07-02 | End: 2019-07-02

## 2019-07-02 RX ORDER — FUROSEMIDE 10 MG/ML
60 INJECTION INTRAMUSCULAR; INTRAVENOUS 2 TIMES DAILY
Status: DISCONTINUED | OUTPATIENT
Start: 2019-07-02 | End: 2019-07-03

## 2019-07-02 RX ORDER — ATORVASTATIN CALCIUM 20 MG/1
20 TABLET, FILM COATED ORAL DAILY
Status: DISCONTINUED | OUTPATIENT
Start: 2019-07-03 | End: 2019-07-05 | Stop reason: HOSPADM

## 2019-07-02 RX ORDER — AMIODARONE HYDROCHLORIDE 200 MG/1
400 TABLET ORAL 2 TIMES DAILY
Status: DISCONTINUED | OUTPATIENT
Start: 2019-07-02 | End: 2019-07-05 | Stop reason: HOSPADM

## 2019-07-02 RX ORDER — SODIUM CHLORIDE 0.9 % (FLUSH) 0.9 %
10 SYRINGE (ML) INJECTION PRN
Status: DISCONTINUED | OUTPATIENT
Start: 2019-07-02 | End: 2019-07-05 | Stop reason: HOSPADM

## 2019-07-02 RX ORDER — ASPIRIN 81 MG/1
81 TABLET, CHEWABLE ORAL DAILY
Status: DISCONTINUED | OUTPATIENT
Start: 2019-07-03 | End: 2019-07-05

## 2019-07-02 RX ADMIN — Medication 10 ML: at 20:57

## 2019-07-02 RX ADMIN — AMIODARONE HYDROCHLORIDE 400 MG: 200 TABLET ORAL at 22:39

## 2019-07-02 RX ADMIN — AMIODARONE HYDROCHLORIDE 400 MG: 200 TABLET ORAL at 16:37

## 2019-07-02 RX ADMIN — DILTIAZEM HYDROCHLORIDE 10 MG: 5 INJECTION INTRAVENOUS at 07:38

## 2019-07-02 RX ADMIN — DILTIAZEM HYDROCHLORIDE 10 MG: 5 INJECTION INTRAVENOUS at 07:07

## 2019-07-02 RX ADMIN — APIXABAN 5 MG: 5 TABLET, FILM COATED ORAL at 20:57

## 2019-07-02 RX ADMIN — AMITRIPTYLINE HYDROCHLORIDE 25 MG: 25 TABLET, FILM COATED ORAL at 20:57

## 2019-07-02 RX ADMIN — FUROSEMIDE 80 MG: 10 INJECTION, SOLUTION INTRAMUSCULAR; INTRAVENOUS at 06:51

## 2019-07-02 RX ADMIN — DILTIAZEM HYDROCHLORIDE 5 MG/HR: 5 INJECTION INTRAVENOUS at 07:25

## 2019-07-02 RX ADMIN — FUROSEMIDE 60 MG: 10 INJECTION, SOLUTION INTRAMUSCULAR; INTRAVENOUS at 16:37

## 2019-07-02 ASSESSMENT — PAIN SCALES - GENERAL
PAINLEVEL_OUTOF10: 0

## 2019-07-02 NOTE — ED PROVIDER NOTES
TABLET BY MOUTH TWICE A DAY    FISH OIL-OMEGA-3 FATTY ACIDS 1000 MG CAPSULE    Take 1,400 g by mouth daily     FLUTICASONE (FLONASE) 50 MCG/ACT NASAL SPRAY    2 sprays by Each Nostril route daily    FUROSEMIDE (LASIX) 40 MG TABLET    2 pills daily for 5 days then to go back down to 1 pill daily    MULTIVITAMIN (THERAGRAN) PER TABLET    Take 1 tablet by mouth daily. PANTOPRAZOLE (PROTONIX) 40 MG TABLET    Take 1 tablet by mouth 2 times daily (before meals)       ALLERGIES     Lisinopril    FAMILY HISTORY       Family History   Problem Relation Age of Onset    Heart Disease Mother         CAD    Diabetes Mother           SOCIAL HISTORY       Social History     Socioeconomic History    Marital status:       Spouse name: None    Number of children: None    Years of education: None    Highest education level: None   Occupational History    None   Social Needs    Financial resource strain: None    Food insecurity:     Worry: None     Inability: None    Transportation needs:     Medical: None     Non-medical: None   Tobacco Use    Smoking status: Never Smoker    Smokeless tobacco: Never Used   Substance and Sexual Activity    Alcohol use: Yes     Comment: very rare    Drug use: No    Sexual activity: Not Currently   Lifestyle    Physical activity:     Days per week: None     Minutes per session: None    Stress: None   Relationships    Social connections:     Talks on phone: None     Gets together: None     Attends Orthodox service: None     Active member of club or organization: None     Attends meetings of clubs or organizations: None     Relationship status: None    Intimate partner violence:     Fear of current or ex partner: None     Emotionally abused: None     Physically abused: None     Forced sexual activity: None   Other Topics Concern    None   Social History Narrative    None         PHYSICAL EXAM    (up to 7 for level 4, 8 or more for level 5)     ED Triage Vitals [07/02/19

## 2019-07-02 NOTE — ED NOTES
Patient alert and oriented. Patient short of breath with any exertion. Elevated heart rate 130-140's. Non productive cough. Heplock in right AC.        Julee Galindo RN  07/02/19 2306

## 2019-07-02 NOTE — H&P
Hospital Medicine History & Physical      PCP: Mani Antony MD    Date of Admission: 7/2/2019    Date of Service: 7/2/19    Chief Complaint:  SOB      History Of Present Illness:       66 y.o. female who presents to [facility] with history of depression, hyperlipidemia, hypertension, pulmonary hypertension, atrial fibrillation, congestive heart failure who presents with shortness of breath. Patient relates shortness of breath over the last week with acute worsening over the last 24 hours. She also notes significant lower extremity swelling and an approximate 10-12 pound weight gain over the last 2 weeks as well. Patient denies chest pain, abdominal pain, nausea, vomiting. She does note paroxysmal nocturnal dyspnea and orthopnea. She denies fevers, chills, recent infections. Patient notes good medication compliance. Initial evaluation found patient to be in atrial fibrillation with rapid ventricular response with clinical evidence of congestive heart failure. She was started on diltiazem drip with control of rate and IV diuretics with good urine output. She was [facility] to [facility] for higher level care. On my assessment, she is doing well and relates that her breathing is better. Review of systems.   All systems reviewed and reported negative except for what is mentioned above in the HPI    Past Medical History:        Diagnosis Date    Atrial fibrillation ([address]     Bleeding ulcer 01/2019    Fibromyalgia     History of blood transfusion 01/2019    2 units    Hyperlipidemia     Hypertension     Osteoporosis     RLS (restless legs syndrome)     Shingles     Stroke ([facility] 2004    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       Past Surgical History:        Procedure Laterality Date    BREAST SURGERY Left     lumpectomy    COLONOSCOPY N/A 6/18/2019    COLONOSCOPY POLYPECTOMY COLD BIOPSY performed by Silvana Schroeder MD at [address]      from left breast  CYST REMOVAL  9/07    scalp cyst excision    HYSTERECTOMY  age 29    OVARIAN CYST REMOVAL Right     Age 24    UPPER GASTROINTESTINAL ENDOSCOPY N/A 1/16/2019    EGD ESOPHAGOGASTRODUODENOSCOPY performed by Ines Herrera MD at [facility] N/A 6/18/2019    EGD BIOPSY performed by Ines Herrera MD at [facility]       Medications Prior to Admission:    Prior to Admission medications    Medication Sig Start Date End Date Taking? Authorizing Provider   fluticasone (FLONASE) 50 MCG/ACT nasal spray 2 sprays by Each Nostril route daily 6/11/19  Yes Le Brownlee MD   amitriptyline (ELAVIL) 25 MG tablet TAKE ONE TABLET BY MOUTH ONCE DAILY IN THE EVENING 6/3/19  Yes Le Brownlee MD   atorvastatin (LIPITOR) 20 MG tablet TAKE ONE TABLET BY MOUTH ONCE DAILY 5/28/19  Yes Le Brownlee MD   ELIQUIS 5 MG TABS tablet TAKE ONE TABLET BY MOUTH TWICE A DAY 4/1/19  Yes Le Brownlee MD   pantoprazole (PROTONIX) 40 MG tablet Take 1 tablet by mouth 2 times daily (before meals) 4/1/19  Yes Le Brownlee MD   aspirin 81 MG tablet Take 81 mg by mouth daily   Yes Historical Provider, MD   diltiazem (CARDIZEM CD) 360 MG extended release capsule Take 1 capsule by mouth daily 2/21/19  Yes Le Brownlee MD   furosemide (LASIX) 40 MG tablet 2 pills daily for 5 days then to go back down to 1 pill daily  Patient taking differently: Take 40 mg by mouth daily 2 pills daily for 5 days then to go back down to 1 pill daily 1/28/19  Yes Le Brownlee MD   Coenzyme Q10 10 MG CAPS Take by mouth daily   Yes Historical Provider, MD   bisoprolol (ZEBETA) 10 MG tablet Take 10 mg by mouth daily  11/15/18  Yes Historical Provider, MD   albuterol sulfate HFA (PROAIR HFA) 108 (90 Base) MCG/ACT inhaler Inhale 2 puffs into the lungs See Admin Instructions Use every 4-6 hours while awake for 7-10 days then 4- 6 hours PRN wheezing, cough and/or congestion.   Dispense with SPACER and Instruct on use 1/5/19  Yes Emmanuel Swanson., MD   fish oil-omega-3 fatty acids 1000 MG capsule Take 1,400 g by mouth daily    Yes Historical Provider, MD   calcium-vitamin D (OSCAL) 250-125 MG-UNIT per tablet Take 1 tablet by mouth 2 times daily. Yes Historical Provider, MD   multivitamin [facility]) per tablet Take 1 tablet by mouth daily. Yes Historical Provider, MD       Allergies:  Lisinopril    Social History:  The patient currently lives at home, cares for herself    TOBACCO:   reports that she has never smoked. She has never used smokeless tobacco.  ETOH:   reports that she drinks alcohol. Family History:  Reviewed in detail and negative for DM, Early CAD, Cancer, CVA. Positive as follows:        Problem Relation Age of Onset    Heart Disease Mother         CAD    Diabetes Mother        PHYSICAL EXAM:    BP (!) 145/82   Pulse 89   Temp 97.4 °F (36.3 °C) (Oral)   Resp 20   Ht 5' 2\" (1.575 m)   Wt 162 lb 11.2 oz (73.8 kg)   SpO2 93%   BMI 29.76 kg/m²     Gen: NAD  HEENT: NC/AT, moist mucous membranes, no oropharyngeal erythema or exudate  Neck: supple, trachea midline, no anterior cervical or SC LAD, difficult to assess JVD  Heart:  Normal s1/s2, RRR, no murmurs, gallops, or rubs.  no leg edema  Lungs: Bibasilar crackles, no rhonchi, no wheezing  Abd: bowel sounds present, soft, nontender, nondistended, no masses  Extrem:  No clubbing, cyanosis; 2+ pitting edema  Skin: no lesion or masses  Psych:  A & O x3  Neuro: grossly intact, moves all four extremities      CXR:  I have reviewed the CXR with the following interpretation: Pulmonary edema, left pleural effusion  EKG:  I have reviewed the EKG with the following interpretation: atrial fibrillation, rate controlled    CBC   Recent Labs     07/02/19  0625   WBC 8.0   HGB 12.3   HCT 37.7         RENAL  Recent Labs     07/02/19  0625      K 4.2      CO2 26   BUN 35*   CREATININE 1.1     LFT'S  No results for input(s): AST, ALT, ALB,

## 2019-07-03 LAB
A/G RATIO: 1.1 (ref 1.1–2.2)
ALBUMIN SERPL-MCNC: 3.4 G/DL (ref 3.4–5)
ALP BLD-CCNC: 91 U/L (ref 40–129)
ALT SERPL-CCNC: 52 U/L (ref 10–40)
ANION GAP SERPL CALCULATED.3IONS-SCNC: 16 MMOL/L (ref 3–16)
AST SERPL-CCNC: 33 U/L (ref 15–37)
BASOPHILS ABSOLUTE: 0.1 K/UL (ref 0–0.2)
BASOPHILS RELATIVE PERCENT: 1.3 %
BILIRUB SERPL-MCNC: 0.3 MG/DL (ref 0–1)
BUN BLDV-MCNC: 33 MG/DL (ref 7–20)
CALCIUM SERPL-MCNC: 8.9 MG/DL (ref 8.3–10.6)
CHLORIDE BLD-SCNC: 101 MMOL/L (ref 99–110)
CO2: 26 MMOL/L (ref 21–32)
CREAT SERPL-MCNC: 0.9 MG/DL (ref 0.6–1.2)
EKG ATRIAL RATE: 117 BPM
EKG DIAGNOSIS: NORMAL
EKG Q-T INTERVAL: 402 MS
EKG QRS DURATION: 140 MS
EKG QTC CALCULATION (BAZETT): 548 MS
EKG R AXIS: 83 DEGREES
EKG T AXIS: 75 DEGREES
EKG VENTRICULAR RATE: 112 BPM
EOSINOPHILS ABSOLUTE: 0.2 K/UL (ref 0–0.6)
EOSINOPHILS RELATIVE PERCENT: 3.7 %
GFR AFRICAN AMERICAN: >60
GFR NON-AFRICAN AMERICAN: >60
GLOBULIN: 3 G/DL
GLUCOSE BLD-MCNC: 109 MG/DL (ref 70–99)
HCT VFR BLD CALC: 35.3 % (ref 36–48)
HEMOGLOBIN: 11.3 G/DL (ref 12–16)
LYMPHOCYTES ABSOLUTE: 1.4 K/UL (ref 1–5.1)
LYMPHOCYTES RELATIVE PERCENT: 21 %
MAGNESIUM: 2.3 MG/DL (ref 1.8–2.4)
MCH RBC QN AUTO: 26.9 PG (ref 26–34)
MCHC RBC AUTO-ENTMCNC: 32.1 G/DL (ref 31–36)
MCV RBC AUTO: 83.9 FL (ref 80–100)
MONOCYTES ABSOLUTE: 0.7 K/UL (ref 0–1.3)
MONOCYTES RELATIVE PERCENT: 10.3 %
NEUTROPHILS ABSOLUTE: 4.2 K/UL (ref 1.7–7.7)
NEUTROPHILS RELATIVE PERCENT: 63.7 %
PDW BLD-RTO: 17.8 % (ref 12.4–15.4)
PLATELET # BLD: 162 K/UL (ref 135–450)
PMV BLD AUTO: 12.4 FL (ref 5–10.5)
POTASSIUM REFLEX MAGNESIUM: 3.7 MMOL/L (ref 3.5–5.1)
RBC # BLD: 4.21 M/UL (ref 4–5.2)
SODIUM BLD-SCNC: 143 MMOL/L (ref 136–145)
TOTAL PROTEIN: 6.4 G/DL (ref 6.4–8.2)
WBC # BLD: 6.7 K/UL (ref 4–11)

## 2019-07-03 PROCEDURE — 6370000000 HC RX 637 (ALT 250 FOR IP): Performed by: INTERNAL MEDICINE

## 2019-07-03 PROCEDURE — 93010 ELECTROCARDIOGRAM REPORT: CPT | Performed by: INTERNAL MEDICINE

## 2019-07-03 PROCEDURE — 80053 COMPREHEN METABOLIC PANEL: CPT

## 2019-07-03 PROCEDURE — 6360000002 HC RX W HCPCS: Performed by: NURSE PRACTITIONER

## 2019-07-03 PROCEDURE — 93005 ELECTROCARDIOGRAM TRACING: CPT | Performed by: NURSE PRACTITIONER

## 2019-07-03 PROCEDURE — 85025 COMPLETE CBC W/AUTO DIFF WBC: CPT

## 2019-07-03 PROCEDURE — 36415 COLL VENOUS BLD VENIPUNCTURE: CPT

## 2019-07-03 PROCEDURE — 99232 SBSQ HOSP IP/OBS MODERATE 35: CPT | Performed by: NURSE PRACTITIONER

## 2019-07-03 PROCEDURE — 83735 ASSAY OF MAGNESIUM: CPT

## 2019-07-03 PROCEDURE — 2580000003 HC RX 258: Performed by: INTERNAL MEDICINE

## 2019-07-03 PROCEDURE — 1200000000 HC SEMI PRIVATE

## 2019-07-03 PROCEDURE — 6370000000 HC RX 637 (ALT 250 FOR IP): Performed by: NURSE PRACTITIONER

## 2019-07-03 PROCEDURE — 94760 N-INVAS EAR/PLS OXIMETRY 1: CPT

## 2019-07-03 RX ORDER — AMITRIPTYLINE HYDROCHLORIDE 10 MG/1
10 TABLET, FILM COATED ORAL NIGHTLY
Status: DISCONTINUED | OUTPATIENT
Start: 2019-07-03 | End: 2019-07-03

## 2019-07-03 RX ORDER — DILTIAZEM HYDROCHLORIDE 300 MG/1
300 CAPSULE, COATED, EXTENDED RELEASE ORAL DAILY
Status: DISCONTINUED | OUTPATIENT
Start: 2019-07-04 | End: 2019-07-05 | Stop reason: HOSPADM

## 2019-07-03 RX ORDER — FUROSEMIDE 10 MG/ML
40 INJECTION INTRAMUSCULAR; INTRAVENOUS 2 TIMES DAILY
Status: DISCONTINUED | OUTPATIENT
Start: 2019-07-03 | End: 2019-07-04

## 2019-07-03 RX ADMIN — ACETAMINOPHEN 650 MG: 325 TABLET ORAL at 14:52

## 2019-07-03 RX ADMIN — FUROSEMIDE 40 MG: 10 INJECTION, SOLUTION INTRAMUSCULAR; INTRAVENOUS at 17:43

## 2019-07-03 RX ADMIN — AMIODARONE HYDROCHLORIDE 400 MG: 200 TABLET ORAL at 20:37

## 2019-07-03 RX ADMIN — APIXABAN 5 MG: 5 TABLET, FILM COATED ORAL at 20:37

## 2019-07-03 RX ADMIN — DILTIAZEM HYDROCHLORIDE 360 MG: 180 CAPSULE, COATED, EXTENDED RELEASE ORAL at 08:28

## 2019-07-03 RX ADMIN — Medication 10 ML: at 08:31

## 2019-07-03 RX ADMIN — ASPIRIN 81 MG 81 MG: 81 TABLET ORAL at 08:30

## 2019-07-03 RX ADMIN — AMIODARONE HYDROCHLORIDE 400 MG: 200 TABLET ORAL at 08:30

## 2019-07-03 RX ADMIN — APIXABAN 5 MG: 5 TABLET, FILM COATED ORAL at 08:29

## 2019-07-03 RX ADMIN — Medication 10 ML: at 20:41

## 2019-07-03 RX ADMIN — FUROSEMIDE 60 MG: 10 INJECTION, SOLUTION INTRAMUSCULAR; INTRAVENOUS at 08:30

## 2019-07-03 RX ADMIN — METOPROLOL TARTRATE 25 MG: 25 TABLET ORAL at 20:37

## 2019-07-03 RX ADMIN — METOPROLOL TARTRATE 50 MG: 50 TABLET ORAL at 08:29

## 2019-07-03 RX ADMIN — ATORVASTATIN CALCIUM 20 MG: 20 TABLET, FILM COATED ORAL at 08:30

## 2019-07-03 ASSESSMENT — PAIN DESCRIPTION - PROGRESSION: CLINICAL_PROGRESSION: NOT CHANGED

## 2019-07-03 ASSESSMENT — PAIN SCALES - GENERAL
PAINLEVEL_OUTOF10: 0
PAINLEVEL_OUTOF10: 3
PAINLEVEL_OUTOF10: 0

## 2019-07-03 ASSESSMENT — PAIN DESCRIPTION - DESCRIPTORS: DESCRIPTORS: ACHING

## 2019-07-03 ASSESSMENT — PAIN DESCRIPTION - ONSET: ONSET: PROGRESSIVE

## 2019-07-03 ASSESSMENT — PAIN DESCRIPTION - PAIN TYPE: TYPE: CHRONIC PAIN

## 2019-07-03 ASSESSMENT — PAIN - FUNCTIONAL ASSESSMENT: PAIN_FUNCTIONAL_ASSESSMENT: ACTIVITIES ARE NOT PREVENTED

## 2019-07-03 ASSESSMENT — PAIN DESCRIPTION - ORIENTATION: ORIENTATION: RIGHT

## 2019-07-03 ASSESSMENT — PAIN DESCRIPTION - FREQUENCY: FREQUENCY: INTERMITTENT

## 2019-07-03 ASSESSMENT — PAIN DESCRIPTION - DIRECTION: RADIATING_TOWARDS: SHOULDER

## 2019-07-03 NOTE — PROGRESS NOTES
Hospital Medicine Progress Note     PCP: Enma Lebron MD    Date of Admission: 7/2/2019    Date of Service: 7/2/19    Chief Complaint:  SOB    Subjective:    Doing well  Denies SOB, CP  Ambulating with minimal symptoms    Review of systems. All systems reviewed and reported negative except for what is mentioned above in the HPI    Past Medical History:        Diagnosis Date    [facility] ([facility]     Bleeding ulcer 01/2019    Fibromyalgia     History of blood transfusion 01/2019    2 units    Hyperlipidemia     Hypertension     Osteoporosis     RLS (restless legs syndrome)     Shingles     Stroke ([facility] 2004    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       Past Surgical History:        Procedure Laterality Date    BREAST SURGERY Left     lumpectomy    COLONOSCOPY N/A 6/18/2019    COLONOSCOPY POLYPECTOMY COLD BIOPSY performed by Vic Campos MD at [facility]      from left breast    CYST REMOVAL  9/07    scalp cyst excision    HYSTERECTOMY  age 29    OVARIAN CYST REMOVAL Right     Age 24    UPPER GASTROINTESTINAL ENDOSCOPY N/A 1/16/2019    EGD ESOPHAGOGASTRODUODENOSCOPY performed by Vic Campos MD at [address] [facility] N/A 6/18/2019    EGD BIOPSY performed by Vic Campos MD at [facility]       Medications Prior to Admission:    Prior to Admission medications    Medication Sig Start Date End Date Taking?  Authorizing Provider   amitriptyline (ELAVIL) 25 MG tablet TAKE ONE TABLET BY MOUTH ONCE DAILY IN THE EVENING 6/3/19  Yes Aspen Carty MD   atorvastatin (LIPITOR) 20 MG tablet TAKE ONE TABLET BY MOUTH ONCE DAILY 5/28/19  Yes Aspen Carty MD   ELIQUIS 5 MG TABS tablet TAKE ONE TABLET BY MOUTH TWICE A DAY 4/1/19  Yes Aspen Carty MD   aspirin 81 MG tablet Take 81 mg by mouth daily   Yes Historical Provider, MD   diltiazem (CARDIZEM CD) 360 MG extended release capsule Take 1 capsule by mouth daily 2/21/19  Yes Martine Brand nondistended, no masses  Extrem:  No clubbing, cyanosis; 2+ pitting edema  Skin: no lesion or masses  Psych:  A & O x3  Neuro: grossly intact, moves all four extremities      CXR:  I have reviewed the CXR with the following interpretation: Pulmonary edema, left pleural effusion  EKG:  I have reviewed the EKG with the following interpretation: atrial fibrillation, rate controlled    CBC   Recent Labs     07/02/19  0625 07/03/19 0428   WBC 8.0 6.7   HGB 12.3 11.3*   HCT 37.7 35.3*    162      RENAL  Recent Labs     07/02/19 0625 07/03/19  0428    143   K 4.2 3.7    101   CO2 26 26   BUN 35* 33*   CREATININE 1.1 0.9     LFT'S  Recent Labs     07/03/19 0428   AST 33   ALT 52*   BILITOT 0.3   ALKPHOS 91     COAG  No results for input(s): INR in the last 72 hours. CARDIAC ENZYMES  Recent Labs     07/02/19  0625 07/02/19  1420   TROPONINI <0.01 <0.01       U/A:    Lab Results   Component Value Date    COLORU Yellow 01/15/2019    WBCUA 3-5 01/15/2019    RBCUA 0-2 01/15/2019    BACTERIA 2+ 01/15/2019    CLARITYU Clear 01/15/2019    SPECGRAV 1.010 01/15/2019    LEUKOCYTESUR TRACE 01/15/2019    BLOODU Negative 01/15/2019    GLUCOSEU Negative 01/15/2019       ABG  No results found for: CAU2IVG, BEART, G1ALYQXQ, PHART, THGBART, DBM5BZY, PO2ART, [address] Problems    Diagnosis Date Noted    Atrial fibrillation ([address] [I48.91] 07/02/2019    Atrial fibrillation with RVR ([facility] [I48.91] 10/25/2018         PHYSICIANS CERTIFICATION:    I certify that Sylvia Woodruff is expected to be hospitalized for more than 2 midnights based on the following assessment and plan:      ASSESSMENT/PLAN:    Atrial fibrillation with rapid ventricular response  -rate controlled at rest, with exertion up to 130's - pt remain asymptomatic  -cardiology following - ct amio 400 bid, dilt 360 qd. MTP 50 bid  -echo reviewed  -Continue Eliquis    Congestive heart failure - ? New onset  -Acute, diastolic  -Evidence by

## 2019-07-03 NOTE — PLAN OF CARE
Problem: HEMODYNAMIC STATUS  Goal: Patient has stable vital signs and fluid balance  7/3/2019 0029 by Anoop Rojas RN  Outcome: Ongoing  On atrial fibrillation, tachycardic when getting up. Started on oral amniodarone today. With as needed metoprolol for tachycardia. Will continue to monitor heart rate. Problem: Falls - Risk of:  Goal: Will remain free from falls  Description  Will remain free from falls  Outcome: Ongoing  Side rails raised. Patient stable when getting up in the bed. But informed to call when feeling dizzy. Nonskid socks on. Bed wheels on lock.    Goal: Absence of physical injury  Description  Absence of physical injury  Outcome: Ongoing

## 2019-07-04 PROBLEM — I50.33 ACUTE ON CHRONIC DIASTOLIC HEART FAILURE (HCC): Status: ACTIVE | Noted: 2019-03-17

## 2019-07-04 LAB
ANION GAP SERPL CALCULATED.3IONS-SCNC: 14 MMOL/L (ref 3–16)
BUN BLDV-MCNC: 43 MG/DL (ref 7–20)
CALCIUM SERPL-MCNC: 8.9 MG/DL (ref 8.3–10.6)
CHLORIDE BLD-SCNC: 99 MMOL/L (ref 99–110)
CO2: 26 MMOL/L (ref 21–32)
CREAT SERPL-MCNC: 1 MG/DL (ref 0.6–1.2)
EKG ATRIAL RATE: 110 BPM
EKG DIAGNOSIS: NORMAL
EKG Q-T INTERVAL: 344 MS
EKG QRS DURATION: 142 MS
EKG QTC CALCULATION (BAZETT): 460 MS
EKG R AXIS: 82 DEGREES
EKG T AXIS: 54 DEGREES
EKG VENTRICULAR RATE: 108 BPM
GFR AFRICAN AMERICAN: >60
GFR NON-AFRICAN AMERICAN: 54
GLUCOSE BLD-MCNC: 107 MG/DL (ref 70–99)
MAGNESIUM: 2.3 MG/DL (ref 1.8–2.4)
POTASSIUM SERPL-SCNC: 4.2 MMOL/L (ref 3.5–5.1)
PRO-BNP: 2526 PG/ML (ref 0–449)
SODIUM BLD-SCNC: 139 MMOL/L (ref 136–145)

## 2019-07-04 PROCEDURE — 83880 ASSAY OF NATRIURETIC PEPTIDE: CPT

## 2019-07-04 PROCEDURE — 6360000002 HC RX W HCPCS: Performed by: NURSE PRACTITIONER

## 2019-07-04 PROCEDURE — 1200000000 HC SEMI PRIVATE

## 2019-07-04 PROCEDURE — 83735 ASSAY OF MAGNESIUM: CPT

## 2019-07-04 PROCEDURE — 93005 ELECTROCARDIOGRAM TRACING: CPT | Performed by: NURSE PRACTITIONER

## 2019-07-04 PROCEDURE — 93010 ELECTROCARDIOGRAM REPORT: CPT | Performed by: INTERNAL MEDICINE

## 2019-07-04 PROCEDURE — 80048 BASIC METABOLIC PNL TOTAL CA: CPT

## 2019-07-04 PROCEDURE — 94760 N-INVAS EAR/PLS OXIMETRY 1: CPT

## 2019-07-04 PROCEDURE — 94664 DEMO&/EVAL PT USE INHALER: CPT

## 2019-07-04 PROCEDURE — 36415 COLL VENOUS BLD VENIPUNCTURE: CPT

## 2019-07-04 PROCEDURE — 6370000000 HC RX 637 (ALT 250 FOR IP): Performed by: INTERNAL MEDICINE

## 2019-07-04 PROCEDURE — 99232 SBSQ HOSP IP/OBS MODERATE 35: CPT | Performed by: INTERNAL MEDICINE

## 2019-07-04 PROCEDURE — 2580000003 HC RX 258: Performed by: INTERNAL MEDICINE

## 2019-07-04 PROCEDURE — 6370000000 HC RX 637 (ALT 250 FOR IP): Performed by: NURSE PRACTITIONER

## 2019-07-04 RX ORDER — FUROSEMIDE 40 MG/1
40 TABLET ORAL 2 TIMES DAILY
Status: DISCONTINUED | OUTPATIENT
Start: 2019-07-04 | End: 2019-07-04

## 2019-07-04 RX ORDER — FUROSEMIDE 10 MG/ML
60 INJECTION INTRAMUSCULAR; INTRAVENOUS ONCE
Status: COMPLETED | OUTPATIENT
Start: 2019-07-04 | End: 2019-07-04

## 2019-07-04 RX ORDER — FUROSEMIDE 40 MG/1
40 TABLET ORAL 2 TIMES DAILY
Status: DISCONTINUED | OUTPATIENT
Start: 2019-07-05 | End: 2019-07-05 | Stop reason: HOSPADM

## 2019-07-04 RX ADMIN — METOPROLOL TARTRATE 25 MG: 25 TABLET ORAL at 09:08

## 2019-07-04 RX ADMIN — FUROSEMIDE 60 MG: 10 INJECTION, SOLUTION INTRAMUSCULAR; INTRAVENOUS at 17:18

## 2019-07-04 RX ADMIN — AMIODARONE HYDROCHLORIDE 400 MG: 200 TABLET ORAL at 08:46

## 2019-07-04 RX ADMIN — AMIODARONE HYDROCHLORIDE 400 MG: 200 TABLET ORAL at 20:23

## 2019-07-04 RX ADMIN — ATORVASTATIN CALCIUM 20 MG: 20 TABLET, FILM COATED ORAL at 08:46

## 2019-07-04 RX ADMIN — PANTOPRAZOLE SODIUM 40 MG: 40 TABLET, DELAYED RELEASE ORAL at 08:47

## 2019-07-04 RX ADMIN — APIXABAN 5 MG: 5 TABLET, FILM COATED ORAL at 20:22

## 2019-07-04 RX ADMIN — METOPROLOL TARTRATE 25 MG: 25 TABLET ORAL at 20:22

## 2019-07-04 RX ADMIN — FUROSEMIDE 40 MG: 10 INJECTION, SOLUTION INTRAMUSCULAR; INTRAVENOUS at 08:47

## 2019-07-04 RX ADMIN — Medication 10 ML: at 20:23

## 2019-07-04 RX ADMIN — APIXABAN 5 MG: 5 TABLET, FILM COATED ORAL at 08:47

## 2019-07-04 RX ADMIN — DILTIAZEM HYDROCHLORIDE 300 MG: 300 CAPSULE, COATED, EXTENDED RELEASE ORAL at 10:34

## 2019-07-04 RX ADMIN — ASPIRIN 81 MG 81 MG: 81 TABLET ORAL at 08:47

## 2019-07-04 RX ADMIN — Medication 10 ML: at 08:48

## 2019-07-04 ASSESSMENT — PAIN SCALES - GENERAL
PAINLEVEL_OUTOF10: 0

## 2019-07-04 NOTE — PLAN OF CARE
Problem: HEMODYNAMIC STATUS  Goal: Patient has stable vital signs and fluid balance  7/4/2019 0335 by Lucinda Tate RN  Outcome: Ongoing  7/3/2019 1908 by Magnolia Brown RN  Outcome: Ongoing     Problem: FLUID AND ELECTROLYTE IMBALANCE  Goal: Fluid and electrolyte balance are achieved/maintained  7/4/2019 0335 by Lucinda Tate RN  Outcome: Ongoing  7/3/2019 1908 by Magnolia Brown RN  Outcome: Ongoing     Problem: ACTIVITY INTOLERANCE/IMPAIRED MOBILITY  Goal: Mobility/activity is maintained at optimum level for patient  7/4/2019 0335 by Lucinda Tate RN  Outcome: Ongoing  7/3/2019 1908 by Magnolia Brown RN  Outcome: Ongoing     Problem: Falls - Risk of:  Goal: Will remain free from falls  Description  Will remain free from falls  7/4/2019 0335 by Lucinda Tate RN  Outcome: Ongoing  7/3/2019 1908 by Magnolia Brown RN  Outcome: Ongoing  Goal: Absence of physical injury  Description  Absence of physical injury  7/4/2019 0335 by Lucinda Tate RN  Outcome: Ongoing  7/3/2019 1908 by Magnolia Brown RN  Outcome: Ongoing

## 2019-07-04 NOTE — PROGRESS NOTES
Hospital Medicine Progress Note      Admit Date: 7/2/2019         Overnight Events: none    CC: F/U for SOB, Atrial fib    HPI:   This is a 54-year-old female with a history of atrial fibrillation, peptic ulcer disease, pretension, hyperlipidemia and pulmonary hypertension who presented to the ED with complaints of shortness of breath. She was found to be in atrial fibrillation with a rapid ventricular response and was initially started on Cardizem drip. Radiology was consulted. Patient was switched to antiarrhythmic for possible cardioversion approximately 1 month. Interval History/Subjective:   Still feeling slightly short of breath today. Denies fevers, chills, chest pain or shortness of breath at present. Does complain of bilateral lower extremity swelling but denies abdominal pain nausea or vomiting. Review of Systems:     Comprehensive ROS negative except as mentioned above.       Past Medical History:        Diagnosis Date    Atrial fibrillation ([address]     Bleeding ulcer 01/2019    Fibromyalgia     History of blood transfusion 01/2019    2 units    Hyperlipidemia     Hypertension     Osteoporosis     RLS (restless legs syndrome)     Shingles     Stroke ([facility] 2004    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       Past Surgical History:        Procedure Laterality Date    BREAST SURGERY Left     lumpectomy    COLONOSCOPY N/A 6/18/2019    COLONOSCOPY POLYPECTOMY COLD BIOPSY performed by Any Henson MD at [address]      from left breast    CYST REMOVAL  9/07    scalp cyst excision    HYSTERECTOMY  age 29    OVARIAN CYST REMOVAL Right     Age 24    UPPER GASTROINTESTINAL ENDOSCOPY N/A 1/16/2019    EGD ESOPHAGOGASTRODUODENOSCOPY performed by Any Henson MD at [address] [place] [address] N/A 6/18/2019    EGD BIOPSY performed by Any Henson MD at [facility]:  Lisinopril    Past medical and surgical history CARDIAC; Low Sodium (2 GM);  Daily Fluid Restriction: 1500 ml    Consults:  IP CONSULT TO HOSPITALIST  IP CONSULT TO SPIRITUAL SERVICES  IP CONSULT TO CARDIOLOGY    Disposition:  [] Home  [] Home with home health [] Rehab [] Psych [] SNF  [] LTAC  [] Long term nursing home or group home [] Transfer to ICU  [] Transfer to PCU [] Other:    Code Status: Full Code    ELOS: tbd but likely in [facility] ZOILA Oscar CNP  07/04/19

## 2019-07-05 VITALS
TEMPERATURE: 98 F | SYSTOLIC BLOOD PRESSURE: 120 MMHG | DIASTOLIC BLOOD PRESSURE: 58 MMHG | HEART RATE: 49 BPM | WEIGHT: 155.65 LBS | HEIGHT: 62 IN | OXYGEN SATURATION: 93 % | RESPIRATION RATE: 20 BRPM | BODY MASS INDEX: 28.64 KG/M2

## 2019-07-05 LAB
ALBUMIN SERPL-MCNC: 3.6 G/DL (ref 3.4–5)
ANION GAP SERPL CALCULATED.3IONS-SCNC: 13 MMOL/L (ref 3–16)
BUN BLDV-MCNC: 38 MG/DL (ref 7–20)
CALCIUM SERPL-MCNC: 9 MG/DL (ref 8.3–10.6)
CHLORIDE BLD-SCNC: 101 MMOL/L (ref 99–110)
CO2: 26 MMOL/L (ref 21–32)
CREAT SERPL-MCNC: 1 MG/DL (ref 0.6–1.2)
GFR AFRICAN AMERICAN: >60
GFR NON-AFRICAN AMERICAN: 54
GLUCOSE BLD-MCNC: 108 MG/DL (ref 70–99)
MAGNESIUM: 2.2 MG/DL (ref 1.8–2.4)
PHOSPHORUS: 4.6 MG/DL (ref 2.5–4.9)
POTASSIUM SERPL-SCNC: 3.3 MMOL/L (ref 3.5–5.1)
SODIUM BLD-SCNC: 140 MMOL/L (ref 136–145)

## 2019-07-05 PROCEDURE — 99233 SBSQ HOSP IP/OBS HIGH 50: CPT | Performed by: NURSE PRACTITIONER

## 2019-07-05 PROCEDURE — 6370000000 HC RX 637 (ALT 250 FOR IP): Performed by: NURSE PRACTITIONER

## 2019-07-05 PROCEDURE — 6370000000 HC RX 637 (ALT 250 FOR IP): Performed by: INTERNAL MEDICINE

## 2019-07-05 PROCEDURE — 83735 ASSAY OF MAGNESIUM: CPT

## 2019-07-05 PROCEDURE — 80069 RENAL FUNCTION PANEL: CPT

## 2019-07-05 PROCEDURE — 94760 N-INVAS EAR/PLS OXIMETRY 1: CPT

## 2019-07-05 PROCEDURE — 36415 COLL VENOUS BLD VENIPUNCTURE: CPT

## 2019-07-05 RX ORDER — AMIODARONE HYDROCHLORIDE 400 MG/1
TABLET ORAL
Qty: 30 TABLET | Refills: 3 | Status: SHIPPED | OUTPATIENT
Start: 2019-07-05 | End: 2019-09-11 | Stop reason: HOSPADM

## 2019-07-05 RX ORDER — DILTIAZEM HYDROCHLORIDE 300 MG/1
300 CAPSULE, COATED, EXTENDED RELEASE ORAL DAILY
Qty: 30 CAPSULE | Refills: 3 | Status: SHIPPED | OUTPATIENT
Start: 2019-07-05 | End: 2019-09-11 | Stop reason: HOSPADM

## 2019-07-05 RX ORDER — FUROSEMIDE 40 MG/1
40 TABLET ORAL DAILY
Qty: 60 TABLET | Refills: 0 | Status: SHIPPED | OUTPATIENT
Start: 2019-07-05 | End: 2019-09-27 | Stop reason: SDUPTHER

## 2019-07-05 RX ORDER — POTASSIUM CHLORIDE 20 MEQ/1
20 TABLET, EXTENDED RELEASE ORAL DAILY
Qty: 90 TABLET | Refills: 1 | Status: SHIPPED | OUTPATIENT
Start: 2019-07-05 | End: 2019-12-20

## 2019-07-05 RX ADMIN — ACETAMINOPHEN 650 MG: 325 TABLET ORAL at 00:54

## 2019-07-05 RX ADMIN — DILTIAZEM HYDROCHLORIDE 300 MG: 300 CAPSULE, COATED, EXTENDED RELEASE ORAL at 10:02

## 2019-07-05 RX ADMIN — APIXABAN 5 MG: 5 TABLET, FILM COATED ORAL at 08:34

## 2019-07-05 RX ADMIN — ASPIRIN 81 MG 81 MG: 81 TABLET ORAL at 08:34

## 2019-07-05 RX ADMIN — METOPROLOL TARTRATE 25 MG: 25 TABLET ORAL at 08:34

## 2019-07-05 RX ADMIN — PANTOPRAZOLE SODIUM 40 MG: 40 TABLET, DELAYED RELEASE ORAL at 08:34

## 2019-07-05 RX ADMIN — AMIODARONE HYDROCHLORIDE 400 MG: 200 TABLET ORAL at 08:34

## 2019-07-05 RX ADMIN — FUROSEMIDE 40 MG: 40 TABLET ORAL at 08:34

## 2019-07-05 RX ADMIN — ATORVASTATIN CALCIUM 20 MG: 20 TABLET, FILM COATED ORAL at 08:34

## 2019-07-05 ASSESSMENT — PAIN SCALES - GENERAL
PAINLEVEL_OUTOF10: 0
PAINLEVEL_OUTOF10: 4
PAINLEVEL_OUTOF10: 0

## 2019-07-05 ASSESSMENT — PAIN DESCRIPTION - FREQUENCY: FREQUENCY: INTERMITTENT

## 2019-07-05 ASSESSMENT — PAIN DESCRIPTION - DESCRIPTORS: DESCRIPTORS: ACHING

## 2019-07-05 ASSESSMENT — PAIN DESCRIPTION - ORIENTATION: ORIENTATION: RIGHT

## 2019-07-05 ASSESSMENT — PAIN DESCRIPTION - PAIN TYPE: TYPE: ACUTE PAIN

## 2019-07-05 ASSESSMENT — PAIN DESCRIPTION - LOCATION: LOCATION: SHOULDER

## 2019-07-05 NOTE — PROGRESS NOTES
or phlebitis. · Respiratory: No for cough or wheezing. No hematemesis. · Gastrointestinal: No abdominal pain, blood in stools. · Genitourinary: No dysuria, or hematuria. · Musculoskeletal: No gait disturbance,    · Integumentary: No rash or pruritis. · Neurological: No headache, change in muscle strength, numbness or tingling. · Psychiatric: No anxiety, or depression. · Endocrine: No temperature intolerance. No excessive thirst, fluid intake, or urination. · Hem/Lymph: No abnormal bruising or bleeding, blood clots or swollen lymph nodes. · Allergic/Immunologic: No nasal congestion or hives. Physical Examination:  Vitals:    07/08/19 1502   BP: 110/60   Pulse: 58   SpO2: 95%         Wt Readings from Last 3 Encounters:   07/08/19 159 lb (72.1 kg)   07/05/19 155 lb 10.3 oz (70.6 kg)   06/18/19 164 lb (74.4 kg)       · Constitutional: Oriented. No distress. · Head: Normocephalic and atraumatic. · Mouth/Throat: Oropharynx is clear and moist.   · Eyes: Conjunctivae clear without jaunduice. PERRL. · Neck: Neck supple. No rigidity. No JVD present. · Cardiovascular: Normal rate, irregular rhythm, S1&S2. · Pulmonary/Chest: Bilateral respiratory sounds. No wheezes, No rhonchi. · Abdominal: Soft. Bowel sounds present. No distension, No tenderness. · Musculoskeletal: No tenderness. Trace LE edema. stockings in place. · Lymphadenopathy: Has no cervical adenopathy. · Neurological: Alert and oriented. Cranial nerve appears intact, No Gross deficit   · Skin: Skin is warm and dry. No rash noted. · Psychiatric: Has a normal mood, affect and behavior     Labs:  Reviewed. No results for input(s): NA, K, CL, CO2, PHOS, BUN, CREATININE in the last 72 hours. Invalid input(s): CA,  TSH  No results for input(s): WBC, HGB, HCT, MCV, PLT in the last 72 hours.   Lab Results   Component Value Date    CKTOTAL 45 05/15/2012    CKMB 0.67 05/15/2012    TROPONINI <0.01 07/02/2019     No results found for:

## 2019-07-05 NOTE — DISCHARGE SUMMARY
Refills: 0              Details   atorvastatin (LIPITOR) 20 MG tablet TAKE ONE TABLET BY MOUTH ONCE DAILY  Qty: 90 tablet, Refills: 0      ELIQUIS 5 MG TABS tablet TAKE ONE TABLET BY MOUTH TWICE A DAY  Qty: 60 tablet, Refills: 3      Coenzyme Q10 10 MG CAPS Take by mouth daily      albuterol sulfate HFA (PROAIR HFA) 108 (90 Base) MCG/ACT inhaler Inhale 2 puffs into the lungs See Admin Instructions Use every 4-6 hours while awake for 7-10 days then 4- 6 hours PRN wheezing, cough and/or congestion. Dispense with SPACER and Instruct on use  Qty: 1 Inhaler, Refills: 1      fish oil-omega-3 fatty acids 1000 MG capsule Take 1,400 g by mouth daily       calcium-vitamin D (OSCAL) 250-125 MG-UNIT per tablet Take 1 tablet by mouth 2 times daily. multivitamin (THERAGRAN) per tablet Take 1 tablet by mouth daily. The patient was seen and examined on day of discharge and this discharge summary is in conjunction with any daily progress note from day of discharge. Hospital Course: This is a 69-year-old female with a history of atrial fibrillation, PUD, hypertension, hyperlipidemia and pulmonary hypertension who presented to the ED with complaints of increasing shortness of breath and chest pains. She was found to be in atrial fibrillation with RVR. She was initially started on Cardizem drip and cardiology was consulted. They did give her an option to have cardioversion however she decided to start with medications at this time. Amiodarone as well as metoprolol was started. Diltiazem was decreased from 360-300. She will continue amiodarone and beta-blocker therapy as an outpatient. She will follow-up with cardiology within the next week to be evaluated further. She did develop diastolic heart failure likely due to atrial fibrillation. She was diuresed and started on Lasix 40 mg daily at discharge. She was instructed to weigh herself when she got home for baseline weight.   She is being discharged

## 2019-07-05 NOTE — PROGRESS NOTES
Pharmacy Heart Failure Medication Reconciliation Note    Pt discharged from [facility] today after admission for Atrial Fibrillation / Heart Failure. Bj Renee has a diagnosis of diastolic heart failure (last EF = \"lower limit of normal\"% on 10/25/18). Patient taking an ACEI / ARB / Entresto:  No: EF lower limit of normal      Patient taking a BETA BLOCKER:  Yes, lopressor - EF lower limit of normal     Patient taking a LOOP DIURETIC: Yes  Patient taking a ALDOSTERONE RECEPTOR ANTAGONIST: No: EF lower limit of normal        Corrections to discharge medications include:  I did remove date of surgery notes from 3/2019 for procedure in April. Discharge Medications:         Medication List        START taking these medications      amiodarone 400 MG tablet  Commonly known as:  PACERONE  Take 1 tablet by mouth 2 times daily for 4 days, THEN 1 tablet daily for 7 days, THEN 0.5 tablets daily. Start taking on:  7/5/2019     metoprolol tartrate 25 MG tablet  Commonly known as:  LOPRESSOR  Take 1 tablet by mouth 2 times daily  Replaces:  bisoprolol 10 MG tablet     potassium chloride 20 MEQ extended release tablet  Commonly known as:  KLOR-CON M  Take 1 tablet by mouth daily            CHANGE how you take these medications      diltiazem 300 MG extended release capsule  Commonly known as:  CARDIZEM CD  Take 1 capsule by mouth daily  What changed:    medication strength  how much to take     furosemide 40 MG tablet  Commonly known as:  LASIX  Take 1 tablet by mouth daily  What changed:    how much to take  how to take this  when to take this  additional instructions            CONTINUE taking these medications      albuterol sulfate  (90 Base) MCG/ACT inhaler  Commonly known as:  PROAIR HFA  Inhale 2 puffs into the lungs See Admin Instructions Use every 4-6 hours while awake for 7-10 days then 4- 6 hours PRN wheezing, cough and/or congestion.   Dispense with SPACER and Instruct on use     atorvastatin 20 MG

## 2019-07-05 NOTE — CONSULTS
ESOPHAGOGASTRODUODENOSCOPY performed by Chris Stubbs MD at [address] [facility] 6/18/2019    EGD BIOPSY performed by Chris Stubbs MD at [place] [address]   Allergen Reactions    Lisinopril      cough       Current Medications:   amitriptyline  25 mg Oral Nightly    [START ON 7/3/2019] aspirin  81 mg Oral Daily    [START ON 7/3/2019] atorvastatin  20 mg Oral Daily    [START ON 7/3/2019] metoprolol tartrate  25 mg Oral BID    [START ON 7/3/2019] diltiazem  360 mg Oral Daily    apixaban  5 mg Oral BID    [START ON 7/3/2019] pantoprazole  40 mg Oral Daily    sodium chloride flush  10 mL Intravenous 2 times per day    furosemide  40 mg Intravenous BID       Social History:  Reviewed. reports that she has never smoked. She has never used smokeless tobacco. She reports that she drinks alcohol. She reports that she does not use drugs. Family History:  Reviewed. family history includes Diabetes in her mother; Heart Disease in her mother. Review of System:    · Constitutional: No fevers, chills. · Eyes: No visual changes or diplopia. No scleral icterus. · ENT: No Headaches. No mouth sores or sore throat. · Cardiovascular: No for chest pain, Yes for dyspnea on exertion, Yes for palpitations or No for loss of consciousness. No cough, hemoptysis, No for pleuritic pain, or phlebitis. · Respiratory: Yes for cough or wheezing. No hematemesis. · Gastrointestinal: No abdominal pain, blood in stools. · Musculoskeletal: No gait disturbance,    · Integumentary: No rash or pruritis. · Neurological: No headache, change in muscle strength, numbness or tingling. · Psychiatric: No anxiety, or depression.     Physical Examination:  Vitals:    07/02/19 1424   BP:    Pulse: 95   Resp:    Temp:    SpO2:       In: 240 [P.O.:240]  Out: 4016    Wt Readings from Last 3 Encounters:   07/02/19 162 lb 11.2 oz (73.8 kg)   06/18/19 164 lb (74.4 kg)   06/11/19 164 lb (74.4
have best impact on quality and longevity of life. She voices understanding and states \"my daughter and I have already been talking and I am going to change a lot of things\". Pt reports she has a scale at home and weighs herself daily. She does not log it. She is agreeable to start doing so. She reports a usual weight of \"about 160# -- but its been going higher\". I provided weight log and corresponding AHA HF Zones sheet. We reviewed the 3/5 rule and s/sx of worsening HF. When she reviewed zone tool, she covered her mouth and stated \"I have been doing all of these (pointing to yellow zone) for months! \". We discussed importance of EARLY report of concerns to most easily address issues. I provided [facility] and [place] [facility] [employer] contact into. I urged her to use the verbage \"I am your HF pt\" when calling in with concerns to hopefully expedite response. She voices understanding. Pt states she does not own a salt shaker. She does not cook with salt. She admits to \"not caring about sodium\" when she eats out. She realizes this must change. She states she and her daughter ate [place] \"and I have been sick ever since\". She states they usually go to [facility] and have the salad bar. We discussed how even salads can be loaded in sodium depending on choices. She is able to list some common high sodium foods -- states \"I should stay away from cold cuts, canned foods, and Chinese restaurants\". We reviewed the rationale of sodium and fluid restrictions. She was able to state recommendations for each from previous teaching. Praise given. She states, however, \"I haven't been following that\". She claims since she had the [address] [place] with her daughter, she has been incredibly thirsty. She has been drinking Ice carbonated drinks throughout the evening and especially on weekends.   Pt still works full time (5am - 2:30pm) and reports she drinks coffee in AM, tea at 9:30am, 11:30am, and with lunch

## 2019-07-05 NOTE — PROGRESS NOTES
Electrophysiology - PROGRESS NOTE    Admit Date: 7/2/2019     Chief Complaint: AF/RVR     Interval History:   Patient seen and examined and notes reviewed. Patient is being followed for AF/RVR. Rate is controlled on telemetry 80-90's. She does feel a bit SOB today, metoprolol was increased yesterday to 50mg BID. BP low side normal. Diuresing well and still with LE swelling. She denies CP, tightness, and heaviness. Started amiodarone yesterday and ECG today shows further QTc prolongation. We will stop Amitriptyline. 7/5- Rate is controlled on tele 90's. She did have some bradycardia overnight during sleep. Continues amio, CCB, and BB. BP low side normal. She denies any further SOB and LE swelling.       In: 270 [P.O.:270]  Out: 2400    Wt Readings from Last 2 Encounters:   07/05/19 155 lb 10.3 oz (70.6 kg)   06/18/19 164 lb (74.4 kg)         Data:   Scheduled Meds:   Scheduled Meds:   furosemide  40 mg Oral BID    metoprolol tartrate  25 mg Oral BID    diltiazem  300 mg Oral Daily    aspirin  81 mg Oral Daily    atorvastatin  20 mg Oral Daily    apixaban  5 mg Oral BID    pantoprazole  40 mg Oral Daily    sodium chloride flush  10 mL Intravenous 2 times per day    amiodarone  400 mg Oral BID     Continuous Infusions:  PRN Meds:.sodium chloride flush, magnesium hydroxide, ondansetron, acetaminophen, perflutren lipid microspheres, metoprolol  Continuous Infusions:    Intake/Output Summary (Last 24 hours) at 7/5/2019 0958  Last data filed at 7/5/2019 0709  Gross per 24 hour   Intake 750 ml   Output 2500 ml   Net -1750 ml       CBC:   Lab Results   Component Value Date    WBC 6.7 07/03/2019    HGB 11.3 07/03/2019     07/03/2019     BMP:  Lab Results   Component Value Date     07/04/2019    K 4.2 07/04/2019    K 3.7 07/03/2019    CL 99 07/04/2019    CO2 26 07/04/2019    BUN 43 07/04/2019    CREATININE 1.0 07/04/2019    GLUCOSE 107 07/04/2019     INR:   Lab Results   Component Value Date    INR January of 2019  -Cardizem 300 mg  -metoprolol 25 bid  -amio started  -stop amitriptyline   -may need coronary workup done as outpatient? ECG changes but Bentley negative and no s/s of ACS  -BP low side normal     Acute Diastolic CHF  -likely d/t AF/RVR  -continue IV lasix 40 mg BID; switch to PO tomorrow?   -monitor I/O's and daily weights  -low sodium diet  -fluid restriction   -monitor BUN/Cr  -trend BNP    The patient is stable from a cardiology standpoint and may be d/c home. She will follow up in clinic in 1-2 weeks to discuss cardioversion. Would stop ASA 81 mg as she is fully anticoagulated with Eliquis. Cardiac Meds at D/C  Amiodarone 400 mg BID until 7/9; 400mg QD x 7 days, then 200 mg QD after  Eliquis 5 mg BID  Lipitor 20 mg QD  Cardizem 300 mg QD  Lasix 60 mg QD  Lopressor 25 mg BID  Potassium 20 mEq    EF of normal  ASA and Statin for CAD  Anticoagulation for AF   No tobacco use.      [facility] CNP  Electrophysiology   [employer]

## 2019-07-06 ENCOUNTER — CARE COORDINATION (OUTPATIENT)
Dept: CASE MANAGEMENT | Age: 79
End: 2019-07-06

## 2019-07-06 DIAGNOSIS — I10 ESSENTIAL HYPERTENSION: Primary | ICD-10-CM

## 2019-07-06 PROCEDURE — 1111F DSCHRG MED/CURRENT MED MERGE: CPT | Performed by: INTERNAL MEDICINE

## 2019-07-06 NOTE — CARE COORDINATION
7/6/19 24 hr post D/C call. Patient states that she is doing well. She states that she does feel a little overwhelmed with all the discharge instructions. She states that she had time to read them all last pm and this am and has them all sorted and catagorized so she sarahy follow the low sodium diet, fluid restrictions and daily weights. She weighed this am. Hwe weight was 155.6. /72 HR 57.  Patient is keeping a log of her weights and is aware to call MD for weight gain of 2-3 lb. Overnight or 5 lb. In a week. She is following a low sodium diet and calculating intake of sodium and avoiding \"fast foods\" . Educated patient on tracking sodium and how to read food labels to limit sodium to 2 Gm. Medication review completed. (1111F)  Patient has an appointment  To see her heart MD 7/8/19 at 3 pm. She states roselia the hospital told her a F/U with PCP was not nessarary if she is seeing her heart MD on 7/8/19. Patient has family to assist her. She has all she needs at this time . No other questions or concerns. Will F/U at a later date. Nanda AZULN RN.

## 2019-07-08 ENCOUNTER — OFFICE VISIT (OUTPATIENT)
Dept: CARDIOLOGY CLINIC | Age: 79
End: 2019-07-08
Payer: MEDICARE

## 2019-07-08 VITALS
BODY MASS INDEX: 29.26 KG/M2 | HEIGHT: 62 IN | WEIGHT: 159 LBS | HEART RATE: 58 BPM | OXYGEN SATURATION: 95 % | SYSTOLIC BLOOD PRESSURE: 110 MMHG | DIASTOLIC BLOOD PRESSURE: 60 MMHG

## 2019-07-08 DIAGNOSIS — I48.19 ATRIAL FIBRILLATION, PERSISTENT (HCC): Primary | ICD-10-CM

## 2019-07-08 PROCEDURE — 1036F TOBACCO NON-USER: CPT | Performed by: NURSE PRACTITIONER

## 2019-07-08 PROCEDURE — 99213 OFFICE O/P EST LOW 20 MIN: CPT | Performed by: NURSE PRACTITIONER

## 2019-07-08 PROCEDURE — 1090F PRES/ABSN URINE INCON ASSESS: CPT | Performed by: NURSE PRACTITIONER

## 2019-07-08 PROCEDURE — G8427 DOCREV CUR MEDS BY ELIG CLIN: HCPCS | Performed by: NURSE PRACTITIONER

## 2019-07-08 PROCEDURE — G8399 PT W/DXA RESULTS DOCUMENT: HCPCS | Performed by: NURSE PRACTITIONER

## 2019-07-08 PROCEDURE — G8417 CALC BMI ABV UP PARAM F/U: HCPCS | Performed by: NURSE PRACTITIONER

## 2019-07-08 PROCEDURE — 1111F DSCHRG MED/CURRENT MED MERGE: CPT | Performed by: NURSE PRACTITIONER

## 2019-07-08 PROCEDURE — 4040F PNEUMOC VAC/ADMIN/RCVD: CPT | Performed by: NURSE PRACTITIONER

## 2019-07-08 PROCEDURE — 93000 ELECTROCARDIOGRAM COMPLETE: CPT | Performed by: NURSE PRACTITIONER

## 2019-07-08 PROCEDURE — 1123F ACP DISCUSS/DSCN MKR DOCD: CPT | Performed by: NURSE PRACTITIONER

## 2019-07-08 PROCEDURE — G8598 ASA/ANTIPLAT THER USED: HCPCS | Performed by: NURSE PRACTITIONER

## 2019-07-10 ENCOUNTER — TELEPHONE (OUTPATIENT)
Dept: CARDIOLOGY CLINIC | Age: 79
End: 2019-07-10

## 2019-07-10 ENCOUNTER — TELEPHONE (OUTPATIENT)
Dept: PHARMACY | Age: 79
End: 2019-07-10

## 2019-07-12 ENCOUNTER — SCHEDULED TELEPHONE ENCOUNTER (OUTPATIENT)
Dept: PHARMACY | Age: 79
End: 2019-07-12

## 2019-07-16 ENCOUNTER — CARE COORDINATION (OUTPATIENT)
Dept: CARE COORDINATION | Age: 79
End: 2019-07-16

## 2019-07-18 NOTE — CARE COORDINATION
complexity of regimen and stress  Plan for overcoming my barriers: provided education by [facility] and CHF nurse, handouts  Confidence: 9/10  Anticipated Goal Completion Date: continuous              Prior to Admission medications    Medication Sig Start Date End Date Taking? Authorizing Provider   amiodarone (PACERONE) 400 MG tablet Take 1 tablet by mouth 2 times daily for 4 days, THEN 1 tablet daily for 7 days, THEN 0.5 tablets daily. 7/5/19 8/15/19  ZOILA Joe - AILYN   metoprolol tartrate (LOPRESSOR) 25 MG tablet Take 1 tablet by mouth 2 times daily 7/5/19   ZOILA Joe - CNP   diltiazem (CARDIZEM CD) 300 MG extended release capsule Take 1 capsule by mouth daily 7/5/19   ZOILA Joe - CNP   furosemide (LASIX) 40 MG tablet Take 1 tablet by mouth daily 7/5/19   ZOILA Joe - CNP   potassium chloride (KLOR-CON M) 20 MEQ extended release tablet Take 1 tablet by mouth daily 7/5/19 April Debra MatamorosgoZOILA green - CNP   atorvastatin (LIPITOR) 20 MG tablet TAKE ONE TABLET BY MOUTH ONCE DAILY 5/28/19   Le Brownlee MD   ELIQUIS 5 MG TABS tablet TAKE ONE TABLET BY MOUTH TWICE A DAY 4/1/19   Le Brownlee MD   Coenzyme Q10 10 MG CAPS Take by mouth daily    Historical Provider, MD   albuterol sulfate HFA (PROAIR HFA) 108 (90 Base) MCG/ACT inhaler Inhale 2 puffs into the lungs See Admin Instructions Use every 4-6 hours while awake for 7-10 days then 4- 6 hours PRN wheezing, cough and/or congestion. Dispense with SPACER and Instruct on use 1/5/19   Dylan Baxter MD   fish oil-omega-3 fatty acids 1000 MG capsule Take 1,400 g by mouth daily     Historical Provider, MD   calcium-vitamin D (OSCAL) 250-125 MG-UNIT per tablet Take 1 tablet by mouth 2 times daily. Historical Provider, MD   multivitamin [facility]) per tablet Take 1 tablet by mouth daily.       Historical Provider, MD       Future Appointments   Date Time Provider Carlyle Parmar   7/22/2019  4:30 PM SCHEDULE, [facility] SCHEDULE

## 2019-07-22 ENCOUNTER — SCHEDULED TELEPHONE ENCOUNTER (OUTPATIENT)
Dept: PHARMACY | Age: 79
End: 2019-07-22

## 2019-08-01 RX ORDER — PANTOPRAZOLE SODIUM 40 MG/1
TABLET, DELAYED RELEASE ORAL
Qty: 60 TABLET | Refills: 2 | Status: SHIPPED | OUTPATIENT
Start: 2019-08-01 | End: 2019-08-26 | Stop reason: SDUPTHER

## 2019-08-05 RX ORDER — APIXABAN 5 MG/1
TABLET, FILM COATED ORAL
Qty: 60 TABLET | Refills: 5 | Status: SHIPPED | OUTPATIENT
Start: 2019-08-05 | End: 2020-08-17 | Stop reason: SDUPTHER

## 2019-08-13 ENCOUNTER — CARE COORDINATION (OUTPATIENT)
Dept: CARE COORDINATION | Age: 79
End: 2019-08-13

## 2019-08-14 ENCOUNTER — OFFICE VISIT (OUTPATIENT)
Dept: PHARMACY | Age: 79
End: 2019-08-14
Payer: MEDICARE

## 2019-08-14 ENCOUNTER — NURSE ONLY (OUTPATIENT)
Dept: CARDIOLOGY CLINIC | Age: 79
End: 2019-08-14
Payer: MEDICARE

## 2019-08-14 DIAGNOSIS — I48.20 CHRONIC ATRIAL FIBRILLATION (HCC): ICD-10-CM

## 2019-08-14 DIAGNOSIS — I48.19 ATRIAL FIBRILLATION, PERSISTENT (HCC): ICD-10-CM

## 2019-08-14 DIAGNOSIS — I48.19 PERSISTENT ATRIAL FIBRILLATION (HCC): Primary | ICD-10-CM

## 2019-08-14 DIAGNOSIS — I50.33 ACUTE ON CHRONIC DIASTOLIC HEART FAILURE (HCC): ICD-10-CM

## 2019-08-14 DIAGNOSIS — I10 ESSENTIAL HYPERTENSION: Primary | ICD-10-CM

## 2019-08-14 LAB
ANION GAP SERPL CALCULATED.3IONS-SCNC: 14 MMOL/L (ref 3–16)
BUN BLDV-MCNC: 34 MG/DL (ref 7–20)
CALCIUM SERPL-MCNC: 9.3 MG/DL (ref 8.3–10.6)
CHLORIDE BLD-SCNC: 99 MMOL/L (ref 99–110)
CO2: 28 MMOL/L (ref 21–32)
CREAT SERPL-MCNC: 1 MG/DL (ref 0.6–1.2)
GFR AFRICAN AMERICAN: >60
GFR NON-AFRICAN AMERICAN: 54
GLUCOSE BLD-MCNC: 92 MG/DL (ref 70–99)
POTASSIUM SERPL-SCNC: 4 MMOL/L (ref 3.5–5.1)
SODIUM BLD-SCNC: 141 MMOL/L (ref 136–145)

## 2019-08-14 PROCEDURE — 99204 OFFICE O/P NEW MOD 45 MIN: CPT | Performed by: NURSE PRACTITIONER

## 2019-08-14 PROCEDURE — 93000 ELECTROCARDIOGRAM COMPLETE: CPT | Performed by: INTERNAL MEDICINE

## 2019-08-15 VITALS
WEIGHT: 155.2 LBS | SYSTOLIC BLOOD PRESSURE: 121 MMHG | DIASTOLIC BLOOD PRESSURE: 83 MMHG | HEART RATE: 94 BPM | BODY MASS INDEX: 28.39 KG/M2 | OXYGEN SATURATION: 99 %

## 2019-08-15 NOTE — PROGRESS NOTES
Psychiatric/Behavioral: Negative for confusion and sleep disturbance. The patient is not nervous/anxious. Sleep: Negative for restless sleep, snoring, frequent waking       Functional Activity New York Heart Association Classification  Patient Symptoms   [x]   Class I No limitation of physical activity. Ordinary physical activity does not cause undue fatigue, palpitation, dyspnea (shortness of breath). []   Class II Slight limitation of physical activity. Comfortable at rest. Ordinary physical activity results in fatigue, palpitation, dyspnea (shortness of breath). []   Class III  Raymundo limitation of physical activity. Comfortable at rest.  Less than ordinary activity causes fatigue, palpitation, dyspnea. []   Class IV Unable to carry on any physical activity without discomfort. Symptoms of heart failure at rest.  If any physical activity is undertaken, discomfort increases. Shortness of Breath:   [x]   None   []   Dyspnea on exertion   []   Dyspnea with normal activities  []   Dyspnea at rest  []   Dyspnea while sleeping    Patient Findings:   []  Missed doses  []  Diet changes  []  Sodium intake changes    []  Alcohol intake changes  []  Night time cough  []  Edema    []  Activity changes   []  Fatigue that limits activity []  Acute illness  []  Early saiety, abd fullness []  Chest pain   []  Other        Objective:   /83 (Site: Left Upper Arm, Position: Sitting, Cuff Size: Medium Adult)   Pulse 94   Wt 155 lb 3.2 oz (70.4 kg)   SpO2 99%   BMI 28.39 kg/m²   BP Readings from Last 3 Encounters:   08/14/19 121/83   07/08/19 110/60   07/05/19 (!) 120/58     Wt Readings from Last 6 Encounters:   08/14/19 155 lb 3.2 oz (70.4 kg)   07/08/19 159 lb (72.1 kg)   07/05/19 155 lb 10.3 oz (70.6 kg)   06/18/19 164 lb (74.4 kg)   06/11/19 164 lb (74.4 kg)   03/11/19 163 lb (73.9 kg)     Physical Exam:   Constitutional: Oriented to person, place, and time. Appears well-developed and well-nourished.  No distress. HENT:   Head: Normocephalic and atraumatic. Right Ear: External ear normal.   Left Ear: External ear normal.   Eyes: Pupils are equal, round, and reactive to light. Conjunctivae and EOM are normal.   Neck: Normal range of motion. Neck supple. No JVD present. No tracheal deviation present. Cardiovascular: Normal rate, regular rhythm and normal heart sounds. No murmur heard. Pulmonary/Chest: Effort normal and breath sounds normal. No respiratory distress. No wheezes. No rales. Abdominal: Soft. Bowel sounds are normal. No distension. There is no tenderness. Musculoskeletal: Normal range of motion. No edema. Neurological: Alert and oriented to person, place, and time. Skin: Skin is warm and dry. Capillary refill takes less than 2 seconds. No rash noted. He is not diaphoretic. Psychiatric: Normal mood and affect.      BMP:   Lab Results   Component Value Date     08/14/2019    K 4.0 08/14/2019    K 3.7 07/03/2019    CL 99 08/14/2019    CO2 28 08/14/2019    BUN 34 08/14/2019    CREATININE 1.0 08/14/2019       CBC:    Lab Results   Component Value Date    WBC 6.7 07/03/2019    HGB 11.3 07/03/2019    HCT 35.3 07/03/2019     07/03/2019     HgA1C:   Lab Results   Component Value Date    LABA1C 6.2 06/11/2019    LABA1C 5.8 03/08/2018    LABA1C 5.7 04/14/2017     TSH:   Lab Results   Component Value Date    TSH 1.29 06/01/2019     Lipids:   Lab Results   Component Value Date    CHOL 177 06/01/2019    TRIG 79 06/01/2019    HDL 66 06/01/2019    HDL 42 05/15/2012    LDLCALC 95 06/01/2019     ProBNP:   Lab Results   Component Value Date    PROBNP 2,526 07/04/2019    PROBNP 3,094 07/02/2019    PROBNP 3,185 01/10/2019       [x]Reviewed daily weight log and assessed self management skills including early recognition and notification of exacerbation   [x]Reinforced daily weights and to call with increase of 3 pounds in one day or 5 pounds in one week or weight increased above dry weight

## 2019-08-15 NOTE — ASSESSMENT & PLAN NOTE
Blood pressure stable today. She is currently taking a beta-blocker and calcium channel blocker. Upon review of her blood pressure log her blood pressure is always on the low side of normal.  She will continue to monitor her sodium and fluids. Patient exercises daily as she is still working a full-time job. She has lost approximately 10 pounds by effort in the last 6 months. No medication adjustments needed today.

## 2019-08-18 LAB
AMIODARONE LEVEL: 0.7 UG/ML (ref 0.5–2)
DES-AMIOD: 0.5 UG/ML

## 2019-08-20 ENCOUNTER — TELEPHONE (OUTPATIENT)
Dept: CARDIOLOGY CLINIC | Age: 79
End: 2019-08-20

## 2019-08-20 DIAGNOSIS — I48.91 ATRIAL FIBRILLATION WITH RVR (HCC): Primary | ICD-10-CM

## 2019-08-20 NOTE — TELEPHONE ENCOUNTER
She will need to be scheduled for a CV. She does not need a BLOSSOM unless she has missed doses of Eliquis in the last 4 weeks.

## 2019-08-22 ENCOUNTER — CARE COORDINATION (OUTPATIENT)
Dept: CARE COORDINATION | Age: 79
End: 2019-08-22

## 2019-08-22 NOTE — TELEPHONE ENCOUNTER
Called and spoke with patient she states the only day she can do the CV is Wednesday and can only do 3:30 pm  Preprocedure instructions give patient verbalized understanding of the instructions. The morning of your Procedure-BLOSSOM you will park in the hospital parking lot and report directly to the cath lab to check in. Date: 9/11/19  Time of Procedure 3:30 pm  Arrive at 2:30 pm      Pre-Procedure Instructions:  1. Do not eat or drink anything 8 hours before your procedure. 2. All other medications can be taken in the morning with sips of water. 3. Do not hold anticoagulation therapy: warfarin, eliquis, xarelto therapy. 4. You will need to complete lab work 5-7 days prior to the procedure. 5. Do not use any lotions, creams or perfume the morning of procedure. 6. Please arrive 1 hour prior to procedure time. 7. Please have a responsible adult to drive you home after procedure. It is recommended you do not drive for 24 hours after procedure. 8. Cath lab will provide you with all post procedure instructions. If you have any questions regarding the procedure itself or medications please call [phone] and ask to speak with a nurse.

## 2019-08-26 RX ORDER — ATORVASTATIN CALCIUM 20 MG/1
TABLET, FILM COATED ORAL
Qty: 90 TABLET | Refills: 0 | Status: SHIPPED | OUTPATIENT
Start: 2019-08-26 | End: 2019-12-04 | Stop reason: SDUPTHER

## 2019-08-26 RX ORDER — PANTOPRAZOLE SODIUM 40 MG/1
TABLET, DELAYED RELEASE ORAL
Qty: 60 TABLET | Refills: 2 | Status: SHIPPED | OUTPATIENT
Start: 2019-08-26 | End: 2019-12-09 | Stop reason: ALTCHOICE

## 2019-09-04 DIAGNOSIS — I48.91 ATRIAL FIBRILLATION WITH RVR (HCC): ICD-10-CM

## 2019-09-05 LAB
ANION GAP SERPL CALCULATED.3IONS-SCNC: 13 MMOL/L (ref 3–16)
BUN BLDV-MCNC: 42 MG/DL (ref 7–20)
CALCIUM SERPL-MCNC: 10 MG/DL (ref 8.3–10.6)
CHLORIDE BLD-SCNC: 102 MMOL/L (ref 99–110)
CO2: 28 MMOL/L (ref 21–32)
CREAT SERPL-MCNC: 1 MG/DL (ref 0.6–1.2)
GFR AFRICAN AMERICAN: >60
GFR NON-AFRICAN AMERICAN: 54
GLUCOSE BLD-MCNC: 94 MG/DL (ref 70–99)
HCT VFR BLD CALC: 37.9 % (ref 36–48)
HEMOGLOBIN: 12.4 G/DL (ref 12–16)
MCH RBC QN AUTO: 28.9 PG (ref 26–34)
MCHC RBC AUTO-ENTMCNC: 32.8 G/DL (ref 31–36)
MCV RBC AUTO: 88 FL (ref 80–100)
PDW BLD-RTO: 18 % (ref 12.4–15.4)
PLATELET # BLD: 119 K/UL (ref 135–450)
PMV BLD AUTO: 11.9 FL (ref 5–10.5)
POTASSIUM SERPL-SCNC: 5.7 MMOL/L (ref 3.5–5.1)
RBC # BLD: 4.3 M/UL (ref 4–5.2)
SODIUM BLD-SCNC: 143 MMOL/L (ref 136–145)
WBC # BLD: 6.8 K/UL (ref 4–11)

## 2019-09-06 ENCOUNTER — CARE COORDINATION (OUTPATIENT)
Dept: CARE COORDINATION | Age: 79
End: 2019-09-06

## 2019-09-09 ENCOUNTER — TELEPHONE (OUTPATIENT)
Dept: CARDIOLOGY CLINIC | Age: 79
End: 2019-09-09

## 2019-09-11 ENCOUNTER — HOSPITAL ENCOUNTER (OUTPATIENT)
Dept: CARDIAC CATH/INVASIVE PROCEDURES | Age: 79
Discharge: HOME | End: 2019-09-11
Payer: MEDICARE

## 2019-09-11 VITALS — HEIGHT: 62 IN | WEIGHT: 154 LBS | BODY MASS INDEX: 28.34 KG/M2

## 2019-09-11 DIAGNOSIS — I48.91 ATRIAL FIBRILLATION WITH RVR (HCC): ICD-10-CM

## 2019-09-11 LAB
CALCIUM IONIZED: 1.17 MMOL/L (ref 1.12–1.32)
EKG ATRIAL RATE: 65 BPM
EKG DIAGNOSIS: NORMAL
EKG P AXIS: 75 DEGREES
EKG P-R INTERVAL: 202 MS
EKG Q-T INTERVAL: 466 MS
EKG QRS DURATION: 156 MS
EKG QTC CALCULATION (BAZETT): 484 MS
EKG R AXIS: -19 DEGREES
EKG T AXIS: 64 DEGREES
EKG VENTRICULAR RATE: 65 BPM
GFR AFRICAN AMERICAN: 58
GFR NON-AFRICAN AMERICAN: 48
GLUCOSE BLD-MCNC: 90 MG/DL (ref 70–99)
PERFORMED ON: ABNORMAL
POC CHLORIDE: 106 MMOL/L (ref 99–110)
POC CREATININE: 1.1 MG/DL (ref 0.6–1.2)
POC POTASSIUM: 3.7 MMOL/L (ref 3.5–5.1)
POC SAMPLE TYPE: ABNORMAL
POC SODIUM: 145 MMOL/L (ref 136–145)

## 2019-09-11 PROCEDURE — 93010 ELECTROCARDIOGRAM REPORT: CPT | Performed by: INTERNAL MEDICINE

## 2019-09-11 PROCEDURE — 92960 CARDIOVERSION ELECTRIC EXT: CPT | Performed by: INTERNAL MEDICINE

## 2019-09-11 PROCEDURE — 92960 CARDIOVERSION ELECTRIC EXT: CPT

## 2019-09-11 PROCEDURE — 84295 ASSAY OF SERUM SODIUM: CPT

## 2019-09-11 PROCEDURE — 82330 ASSAY OF CALCIUM: CPT

## 2019-09-11 PROCEDURE — 93005 ELECTROCARDIOGRAM TRACING: CPT | Performed by: INTERNAL MEDICINE

## 2019-09-11 PROCEDURE — 82565 ASSAY OF CREATININE: CPT

## 2019-09-11 PROCEDURE — 84132 ASSAY OF SERUM POTASSIUM: CPT

## 2019-09-11 PROCEDURE — 82947 ASSAY GLUCOSE BLOOD QUANT: CPT

## 2019-09-11 PROCEDURE — 82435 ASSAY OF BLOOD CHLORIDE: CPT

## 2019-09-11 RX ORDER — SODIUM CHLORIDE 0.9 % (FLUSH) 0.9 %
10 SYRINGE (ML) INJECTION EVERY 12 HOURS SCHEDULED
Status: DISCONTINUED | OUTPATIENT
Start: 2019-09-11 | End: 2019-09-12 | Stop reason: HOSPADM

## 2019-09-11 RX ORDER — SODIUM CHLORIDE 9 MG/ML
INJECTION, SOLUTION INTRAVENOUS CONTINUOUS
Status: DISCONTINUED | OUTPATIENT
Start: 2019-09-11 | End: 2019-09-12 | Stop reason: HOSPADM

## 2019-09-11 RX ORDER — SODIUM CHLORIDE 0.9 % (FLUSH) 0.9 %
10 SYRINGE (ML) INJECTION PRN
Status: DISCONTINUED | OUTPATIENT
Start: 2019-09-11 | End: 2019-09-12 | Stop reason: HOSPADM

## 2019-09-11 RX ORDER — AMIODARONE HYDROCHLORIDE 200 MG/1
200 TABLET ORAL DAILY
Qty: 30 TABLET | Refills: 3 | Status: SHIPPED | OUTPATIENT
Start: 2019-09-11 | End: 2019-10-03

## 2019-09-11 NOTE — PRE SEDATION
H&P Update    I have reviewed the history and physical and examined the patient and find no relevant changes. I have reviewed with the patient and/or family the risks, benefits, and alternatives to the procedure. Pre-sedation Assessment    Patient:  Poppy Roy   :   1940  Intended Procedure: Cardioversion    [facility] nurse's notes reviewed and agreed. Medications reviewed  Allergies:    Allergies   Allergen Reactions    Lisinopril      cough         Pre-Procedure Assessment/Plan:  ASA 2 - Patient with mild systemic disease with no functional limitations    Level of Sedation Plan:Deep sedation      Post Procedure plan: Return to same level of care

## 2019-09-11 NOTE — H&P
the last 72 hours. No results for input(s): APTT in the last 72 hours. No results for input(s): CKTOTAL, CKMB, CKMBINDEX, TROPONINI in the last 72 hours. Lab Results   Component Value Date    HDL 66 06/01/2019    HDL 42 05/15/2012    LDLCALC 95 06/01/2019    TRIG 79 06/01/2019     No results for input(s): AST, ALT, LABALBU in the last 72 hours. ECHO:  10/26/2018  Summary   Left ventricular cavity size is normal.   Normal left ventricular wall thickness.   Difficult to estimate EF due to rate/rhythm/arrhythmia but is lower limit of   normal   Mitral annular calcification is present.   Mitral valve leaflets appear mildly thickened.   Mild mitral regurgitation.   The left atrium is dilated.   right ventricular is slightly increased in size.   Mild tricuspid regurgitation with RVSP of 37 mmHg.   The right atrium is dilated.     Stress Test: 3/11/2016   Conclusions    Summary    unable to gate due to irreg hr    Normal stress myocardial perfusion.    There is normal isotope uptake at stress and rest. There is no evidence of    myocardial ischemia or scar.              ASSESSMENT AND PLAN:      Patient in persistent atrial fibrillation  Here for cardioversion  Has been on Eliquis for 6 months without interruption  Procedure and risks explained to the patient who understands and wishes to proceed       Ulises Cedillo M.D  9/11/2019

## 2019-09-11 NOTE — OP NOTE
Cardioversion    Indication: AF    Anesthesia: Brevital  40.20mg    Two synchronized biphasic cardioversion attempts  Successful    Converted to SR.     Macey Brewer M.D.

## 2019-09-13 ENCOUNTER — HOSPITAL ENCOUNTER (EMERGENCY)
Age: 79
Discharge: HOME | End: 2019-09-13
Attending: EMERGENCY MEDICINE
Payer: MEDICARE

## 2019-09-13 ENCOUNTER — APPOINTMENT (OUTPATIENT)
Dept: GENERAL RADIOLOGY | Age: 79
End: 2019-09-13
Payer: MEDICARE

## 2019-09-13 VITALS
TEMPERATURE: 98.3 F | SYSTOLIC BLOOD PRESSURE: 151 MMHG | DIASTOLIC BLOOD PRESSURE: 55 MMHG | WEIGHT: 156.09 LBS | HEIGHT: 62 IN | BODY MASS INDEX: 28.72 KG/M2 | OXYGEN SATURATION: 94 % | RESPIRATION RATE: 14 BRPM | HEART RATE: 71 BPM

## 2019-09-13 DIAGNOSIS — J81.0 ACUTE PULMONARY EDEMA (HCC): Primary | ICD-10-CM

## 2019-09-13 LAB
A/G RATIO: 1.6 (ref 1.1–2.2)
ALBUMIN SERPL-MCNC: 4.4 G/DL (ref 3.4–5)
ALP BLD-CCNC: 104 U/L (ref 40–129)
ALT SERPL-CCNC: 147 U/L (ref 10–40)
ANION GAP SERPL CALCULATED.3IONS-SCNC: 16 MMOL/L (ref 3–16)
AST SERPL-CCNC: 99 U/L (ref 15–37)
BASOPHILS ABSOLUTE: 0.1 K/UL (ref 0–0.2)
BASOPHILS RELATIVE PERCENT: 0.7 %
BILIRUB SERPL-MCNC: 0.4 MG/DL (ref 0–1)
BILIRUBIN URINE: NEGATIVE
BLOOD, URINE: NEGATIVE
BUN BLDV-MCNC: 36 MG/DL (ref 7–20)
CALCIUM SERPL-MCNC: 9.1 MG/DL (ref 8.3–10.6)
CHLORIDE BLD-SCNC: 104 MMOL/L (ref 99–110)
CLARITY: CLEAR
CO2: 25 MMOL/L (ref 21–32)
COLOR: YELLOW
CREAT SERPL-MCNC: 0.9 MG/DL (ref 0.6–1.2)
D DIMER: 0.31 UG/ML FEU
EKG ATRIAL RATE: 67 BPM
EKG DIAGNOSIS: NORMAL
EKG P AXIS: 42 DEGREES
EKG P-R INTERVAL: 206 MS
EKG Q-T INTERVAL: 450 MS
EKG QRS DURATION: 146 MS
EKG QTC CALCULATION (BAZETT): 475 MS
EKG R AXIS: 102 DEGREES
EKG T AXIS: 74 DEGREES
EKG VENTRICULAR RATE: 67 BPM
EOSINOPHILS ABSOLUTE: 0.2 K/UL (ref 0–0.6)
EOSINOPHILS RELATIVE PERCENT: 2.4 %
EPITHELIAL CELLS, UA: NORMAL /HPF
GFR AFRICAN AMERICAN: >60
GFR NON-AFRICAN AMERICAN: >60
GLOBULIN: 2.7 G/DL
GLUCOSE BLD-MCNC: 135 MG/DL (ref 70–99)
GLUCOSE URINE: NEGATIVE MG/DL
HCT VFR BLD CALC: 39.9 % (ref 36–48)
HEMOGLOBIN: 12.8 G/DL (ref 12–16)
KETONES, URINE: NEGATIVE MG/DL
LEUKOCYTE ESTERASE, URINE: ABNORMAL
LYMPHOCYTES ABSOLUTE: 1.5 K/UL (ref 1–5.1)
LYMPHOCYTES RELATIVE PERCENT: 20.2 %
MCH RBC QN AUTO: 27.9 PG (ref 26–34)
MCHC RBC AUTO-ENTMCNC: 32.1 G/DL (ref 31–36)
MCV RBC AUTO: 87 FL (ref 80–100)
MICROSCOPIC EXAMINATION: YES
MONOCYTES ABSOLUTE: 0.5 K/UL (ref 0–1.3)
MONOCYTES RELATIVE PERCENT: 6.6 %
NEUTROPHILS ABSOLUTE: 5.2 K/UL (ref 1.7–7.7)
NEUTROPHILS RELATIVE PERCENT: 70.1 %
NITRITE, URINE: NEGATIVE
PDW BLD-RTO: 17.1 % (ref 12.4–15.4)
PH UA: 7 (ref 5–8)
PLATELET # BLD: 135 K/UL (ref 135–450)
PMV BLD AUTO: 13.6 FL (ref 5–10.5)
POTASSIUM SERPL-SCNC: 4.2 MMOL/L (ref 3.5–5.1)
PRO-BNP: 3557 PG/ML (ref 0–449)
PROTEIN UA: NEGATIVE MG/DL
RBC # BLD: 4.59 M/UL (ref 4–5.2)
RBC UA: NORMAL /HPF (ref 0–2)
SODIUM BLD-SCNC: 145 MMOL/L (ref 136–145)
SPECIFIC GRAVITY UA: 1.01 (ref 1–1.03)
TOTAL PROTEIN: 7.1 G/DL (ref 6.4–8.2)
TROPONIN: <0.01 NG/ML
URINE REFLEX TO CULTURE: YES
URINE TYPE: ABNORMAL
UROBILINOGEN, URINE: 0.2 E.U./DL
WBC # BLD: 7.5 K/UL (ref 4–11)
WBC UA: NORMAL /HPF (ref 0–5)

## 2019-09-13 PROCEDURE — 84484 ASSAY OF TROPONIN QUANT: CPT

## 2019-09-13 PROCEDURE — 96374 THER/PROPH/DIAG INJ IV PUSH: CPT

## 2019-09-13 PROCEDURE — 80053 COMPREHEN METABOLIC PANEL: CPT

## 2019-09-13 PROCEDURE — 71045 X-RAY EXAM CHEST 1 VIEW: CPT

## 2019-09-13 PROCEDURE — 85379 FIBRIN DEGRADATION QUANT: CPT

## 2019-09-13 PROCEDURE — 36415 COLL VENOUS BLD VENIPUNCTURE: CPT

## 2019-09-13 PROCEDURE — 99285 EMERGENCY DEPT VISIT HI MDM: CPT

## 2019-09-13 PROCEDURE — 6370000000 HC RX 637 (ALT 250 FOR IP): Performed by: EMERGENCY MEDICINE

## 2019-09-13 PROCEDURE — 83880 ASSAY OF NATRIURETIC PEPTIDE: CPT

## 2019-09-13 PROCEDURE — 6360000002 HC RX W HCPCS: Performed by: EMERGENCY MEDICINE

## 2019-09-13 PROCEDURE — 93005 ELECTROCARDIOGRAM TRACING: CPT | Performed by: EMERGENCY MEDICINE

## 2019-09-13 PROCEDURE — 87086 URINE CULTURE/COLONY COUNT: CPT

## 2019-09-13 PROCEDURE — 85025 COMPLETE CBC W/AUTO DIFF WBC: CPT

## 2019-09-13 PROCEDURE — 93010 ELECTROCARDIOGRAM REPORT: CPT | Performed by: INTERNAL MEDICINE

## 2019-09-13 PROCEDURE — 81001 URINALYSIS AUTO W/SCOPE: CPT

## 2019-09-13 RX ORDER — FUROSEMIDE 10 MG/ML
40 INJECTION INTRAMUSCULAR; INTRAVENOUS ONCE
Status: COMPLETED | OUTPATIENT
Start: 2019-09-13 | End: 2019-09-13

## 2019-09-13 RX ORDER — IPRATROPIUM BROMIDE AND ALBUTEROL SULFATE 2.5; .5 MG/3ML; MG/3ML
1 SOLUTION RESPIRATORY (INHALATION) ONCE
Status: COMPLETED | OUTPATIENT
Start: 2019-09-13 | End: 2019-09-13

## 2019-09-13 RX ORDER — PREDNISONE 20 MG/1
60 TABLET ORAL ONCE
Status: COMPLETED | OUTPATIENT
Start: 2019-09-13 | End: 2019-09-13

## 2019-09-13 RX ADMIN — FUROSEMIDE 40 MG: 10 INJECTION, SOLUTION INTRAMUSCULAR; INTRAVENOUS at 08:35

## 2019-09-13 RX ADMIN — PREDNISONE 60 MG: 20 TABLET ORAL at 07:45

## 2019-09-13 RX ADMIN — IPRATROPIUM BROMIDE AND ALBUTEROL SULFATE 1 AMPULE: .5; 3 SOLUTION RESPIRATORY (INHALATION) at 08:36

## 2019-09-13 RX ADMIN — IPRATROPIUM BROMIDE AND ALBUTEROL SULFATE 1 AMPULE: .5; 3 SOLUTION RESPIRATORY (INHALATION) at 07:46

## 2019-09-13 ASSESSMENT — ENCOUNTER SYMPTOMS
ABDOMINAL PAIN: 0
COLOR CHANGE: 0
VOICE CHANGE: 0
FACIAL SWELLING: 0
TROUBLE SWALLOWING: 0
SHORTNESS OF BREATH: 1
COUGH: 1
NAUSEA: 0
STRIDOR: 0
WHEEZING: 1
VOMITING: 0

## 2019-09-13 ASSESSMENT — PAIN SCALES - GENERAL
PAINLEVEL_OUTOF10: 0

## 2019-09-13 NOTE — ED NOTES
Patient states she is feeling so much better now. Her breathing has greatly improved. Tolerating activity up to bed side commode well.      Be Olivier RN  09/13/19 1064

## 2019-09-13 NOTE — ED PROVIDER NOTES
[id number] 09/13/19 0900 09/13/19 0910 09/13/19 0925   BP:  (!) 148/48     Pulse: 64 68 70 65   Resp: 20 29 23 19   Temp:       TempSrc:       SpO2: 100% 97% 99% 98%   Weight:       Height:             MDM  Patient is afebrile, has a normal white blood cell count, does not have any signs of pneumonia on chest x-ray do not suspect acute pneumonia. She denies any chest pain, has no signs of DVT, and d-dimer is within normal limits I do not suspect PE. She denies any chest pain and has a negative troponin I do not suspect ACS. I feel patient's history physical exam and work-up are far more consistent with a mild CHF exacerbation with pulmonary edema and the patient's physical exam, chest x-ray, and BNP all support this. The patient is given 1 dose of IV Lasix and has over a liter of diuresis. She reports substantial improvement of this. After this she is able to ambulate in the emergency department on room air maintaining oxygen saturation of 93% or above throughout her stay. She reports substantial improvement and has an oxygen saturation in the high 90s on room air at rest.  She states she feels like \"a new woman\" and feels comfortable being discharged home with strict ER return precautions and close primary care follow-up. The patient expresses understanding and agreement with this plan and is discharged home. I have considered and evaluated for the following diagnoses and estimate there is low risk for acute coronary syndrome, pericardial tamponade, pneumothorax, pulmonary embolism, sepsis, aortic dissection, or severe case of COPD and/or pneumonia which require inpatient care and thus I consider the discharge disposition reasonable. We have discussed the diagnosis and risks, and we agree with discharging home to follow-up with their primary doctor. We also discussed returning to the Emergency Department immediately if new or worsening symptoms occur.  We have discussed the symptoms which are most concerning

## 2019-09-14 LAB — URINE CULTURE, ROUTINE: NORMAL

## 2019-09-16 ENCOUNTER — CARE COORDINATION (OUTPATIENT)
Dept: CARE COORDINATION | Age: 79
End: 2019-09-16

## 2019-09-25 ENCOUNTER — OFFICE VISIT (OUTPATIENT)
Dept: CARDIOLOGY CLINIC | Age: 79
End: 2019-09-25
Payer: MEDICARE

## 2019-09-25 VITALS
DIASTOLIC BLOOD PRESSURE: 64 MMHG | HEART RATE: 66 BPM | HEIGHT: 62 IN | WEIGHT: 147 LBS | OXYGEN SATURATION: 97 % | BODY MASS INDEX: 27.05 KG/M2 | SYSTOLIC BLOOD PRESSURE: 134 MMHG

## 2019-09-25 DIAGNOSIS — I48.19 PERSISTENT ATRIAL FIBRILLATION (HCC): Primary | ICD-10-CM

## 2019-09-25 PROCEDURE — G8417 CALC BMI ABV UP PARAM F/U: HCPCS | Performed by: INTERNAL MEDICINE

## 2019-09-25 PROCEDURE — 1123F ACP DISCUSS/DSCN MKR DOCD: CPT | Performed by: INTERNAL MEDICINE

## 2019-09-25 PROCEDURE — G8399 PT W/DXA RESULTS DOCUMENT: HCPCS | Performed by: INTERNAL MEDICINE

## 2019-09-25 PROCEDURE — G8427 DOCREV CUR MEDS BY ELIG CLIN: HCPCS | Performed by: INTERNAL MEDICINE

## 2019-09-25 PROCEDURE — 1036F TOBACCO NON-USER: CPT | Performed by: INTERNAL MEDICINE

## 2019-09-25 PROCEDURE — 99214 OFFICE O/P EST MOD 30 MIN: CPT | Performed by: INTERNAL MEDICINE

## 2019-09-25 PROCEDURE — 93000 ELECTROCARDIOGRAM COMPLETE: CPT | Performed by: INTERNAL MEDICINE

## 2019-09-25 PROCEDURE — 4040F PNEUMOC VAC/ADMIN/RCVD: CPT | Performed by: INTERNAL MEDICINE

## 2019-09-25 PROCEDURE — 1090F PRES/ABSN URINE INCON ASSESS: CPT | Performed by: INTERNAL MEDICINE

## 2019-09-25 PROCEDURE — G8598 ASA/ANTIPLAT THER USED: HCPCS | Performed by: INTERNAL MEDICINE

## 2019-09-25 NOTE — PATIENT INSTRUCTIONS
Patient Education        Atrial Fibrillation: Care Instructions  Your Care Instructions    Atrial fibrillation is an irregular and often fast heartbeat. Treating this condition is important for several reasons. It can cause blood clots, which can travel from your heart to your brain and cause a stroke. If you have a fast heartbeat, you may feel lightheaded, dizzy, and weak. An irregular heartbeat can also increase your risk for heart failure. Atrial fibrillation is often the result of another heart condition, such as high blood pressure or coronary artery disease. Making changes to improve your heart condition will help you stay healthy and active. Follow-up care is a key part of your treatment and safety. Be sure to make and go to all appointments, and call your doctor if you are having problems. It's also a good idea to know your test results and keep a list of the medicines you take. How can you care for yourself at home? Medicines    · Take your medicines exactly as prescribed. Call your doctor if you think you are having a problem with your medicine. You will get more details on the specific medicines your doctor prescribes.     · If your doctor has given you a blood thinner to prevent a stroke, be sure you get instructions about how to take your medicine safely. Blood thinners can cause serious bleeding problems.     · Do not take any vitamins, over-the-counter drugs, or herbal products without talking to your doctor first.    Lifestyle changes    · Do not smoke. Smoking can increase your chance of a stroke and heart attack. If you need help quitting, talk to your doctor about stop-smoking programs and medicines. These can increase your chances of quitting for good.     · Eat a heart-healthy diet.     · Stay at a healthy weight. Lose weight if you need to.     · Limit alcohol to 2 drinks a day for men and 1 drink a day for women. Too much alcohol can cause health problems.     · Avoid colds and flu.  Get procedure destroys (ablates) small areas of your heart that are causing your heart rhythm problem. The doctor puts plastic tubes called catheters into blood vessels in your groin, arm, or neck. He or she then uses an X-ray machine to guide long wires through the tubes to your heart. The doctor can use these wires to record your heart's electrical signals. If the doctor thinks your problem can be fixed with ablation, he or she can use the wires to destroy a small part of your heart tissue. This is most often done with radio waves. You will probably be awake during the procedure. But you could be asleep. The doctor will give you medicines to help you feel relaxed and to numb the areas where the catheters go in. An EPS and ablation can take 2 to 6 hours. In rare cases, it can take longer. If you have EPS only and you do not need more treatment, you may go home the same day. But if you also have ablation, you may stay overnight in the hospital. How long you stay in the hospital depends on the type of ablation you have. Do not exercise hard or lift anything heavy for a week. You may be able to go back to work and to your normal routine in 1 or 2 days. Follow-up care is a key part of your treatment and safety. Be sure to make and go to all appointments, and call your doctor if you are having problems. It's also a good idea to know your test results and keep a list of the medicines you take. What happens before the procedure?   Preparing for the procedure    · Understand exactly what procedure is planned, along with the risks, benefits, and other options. · Tell your doctors ALL the medicines, vitamins, supplements, and herbal remedies you take. Some of these can increase the risk of bleeding or interact with anesthesia.     · If you take blood thinners, such as warfarin (Coumadin), clopidogrel (Plavix), or aspirin, be sure to talk to your doctor.  He or she will tell you if you should stop taking these medicines also check the catheter site for bleeding.     · If the catheter was put in your groin, you will need to lie still and keep your leg straight for several hours. The nurse may put a weighted bag on your leg to keep it still.     · If the catheter was put in your arm, you may be able to sit up and get out of bed right away. But you will need to keep your arm still for at least 1 hour.     · You may have a bruise or a small lump where the catheter was put in your blood vessel. This is normal and will go away. Going home   · Be sure you have someone to drive you home. Anesthesia and pain medicine make it unsafe for you to drive.     · You will be given more specific instructions about recovering from your procedure. They will cover things like diet, wound care, follow-up care, driving, and getting back to your normal routine. When should you call your doctor? · You have questions or concerns.     · You don't understand how to prepare for your procedure.     · You become ill before the procedure (such as fever, flu, or a cold).     · You need to reschedule or have changed your mind about having the procedure. Where can you learn more? Go to https://Twijector.Pathbrite. org and sign in to your CityPockets account. Enter W875 in the Skydeck box to learn more about \"Electrophysiology Study and Catheter Ablation: Before Your Procedure. \"     If you do not have an account, please click on the \"Sign Up Now\" link. Current as of: July 22, 2018  Content Version: 12.1  © 0082-3621 Healthwise, Incorporated. Care instructions adapted under license by [facility] ([facility]). If you have questions about a medical condition or this instruction, always ask your healthcare professional. [place] [address] any warranty or liability for your use of this information.          Patient Education        Electrophysiology Study and Catheter Ablation: What to Expect at [address] [place] [address] had an vision changes. ? Sudden trouble speaking. ? Sudden confusion or trouble understanding simple statements. ? Sudden problems with walking or balance. ? A sudden, severe headache that is different from past headaches.    Call your doctor now or seek immediate medical care if:    · You are bleeding from the area where the catheter was put in your blood vessel.     · You have a fast-growing, painful lump at the catheter site.     · You have signs of infection, such as:  ? Increased pain, swelling, warmth, or redness. ? Red streaks leading from the catheter site. ? Pus draining from the catheter site. ? A fever.     · Your leg, arm, or hand is painful, looks blue, or feels cold, numb, or tingly.    Watch closely for any changes in your health, and be sure to contact your doctor if you have any problems. Where can you learn more? Go to https://Filip TechnologiespeTBT Group.Arktis Radiation Detectors. org and sign in to your [employer] account. Enter [phone] in the eTukTuk box to learn more about \"Electrophysiology Study and Catheter Ablation: What to Expect at Home. \"     If you do not have an account, please click on the \"Sign Up Now\" link. Current as of: July 22, 2018  Content Version: 12.1  © 0108-9662 Healthwise, Incorporated. Care instructions adapted under license by [facility] ([facility]). If you have questions about a medical condition or this instruction, always ask your healthcare professional. [place] [address] any warranty or liability for your use of this information.

## 2019-09-25 NOTE — PROGRESS NOTES
premature CAD    Review of System:    · General ROS: negative for - chills, fever   · Psychological ROS: negative for - anxiety or depression  · Ophthalmic ROS: negative for - eye pain or loss of vision  · ENT ROS: negative for - epistaxis, headaches, nasal discharge, sore throat   · Allergy and Immunology ROS: negative for - hives, nasal congestion   · Hematological and Lymphatic ROS: negative for - bleeding problems, blood clots, bruising or jaundice  · Endocrine ROS: negative for - skin changes, temperature intolerance or unexpected weight changes  · Respiratory ROS: negative for - cough, hemoptysis, pleuritic pain, SOB, sputum changes or wheezing  · Cardiovascular ROS: Per HPI. + heart racing,  +palpitations. · Gastrointestinal ROS: negative for - abdominal pain, blood in stools, diarrhea, hematemesis, melena, nausea/vomiting or swallowing difficulty/pain  · Genito-Urinary ROS: negative for - dysuria or incontinence  · Musculoskeletal ROS: negative for - joint swelling or muscle pain  · Neurological ROS: negative for - confusion, dizziness, gait disturbance, headaches, numbness/tingling, seizures, speech problems, tremors, visual changes or weakness  · Dermatological ROS: negative for - rash    Physical Examination:  Vitals:    09/25/19 1419   BP: 134/64   Pulse: 66   SpO2: 97%       · Constitutional: Oriented. No distress. · Head: Normocephalic and atraumatic. · Mouth/Throat: Oropharynx is clear and moist.   · Eyes: Conjunctivae normal. EOM are normal.   · Neck: Normal range of motion. Neck supple. No rigidity. No JVD present. · Cardiovascular: Normal rate, regular rhythm, S1&S2 and intact distal pulses. · Pulmonary/Chest: Bilateral respiratory sounds. No wheezes. No rhonchi. · Abdominal: Soft. Bowel sounds present. No distension, No tenderness. · Musculoskeletal: No tenderness. No edema    · Lymphadenopathy: Has no cervical adenopathy. · Neurological: Alert and oriented.  Cranial nerve appears intact, No Gross deficit   · Skin: Skin is warm and dry. No rash noted. · Psychiatric: Has a normal mood, affect and behavior     Labs:  Reviewed. EC19 reviewed, sinus rhythm, LBBB with v-rate of 63 bpm with QRS duration 153  ms. No pathologic Q waves, ventricular pre-excitation, or QT prolongation. Studies:   1. Event monitor:       2. Echo: 10/26/2018  Summary  Left ventricular cavity size is normal.  Normal left ventricular wall thickness. Difficult to estimate EF due to rate/rhythm/arrhythmia but is lower limit of normal  Mitral annular calcification is present. Mitral valve leaflets appear mildly thickened. Mild mitral regurgitation. The left atrium is dilated. Right ventricular is slightly increased in size. Mild tricuspid regurgitation with RVSP of 37 mmHg. The right atrium is dilated. 3. Stress Test:  3/11/2016   Conclusions    Summary    unable to gate due to irreg hr    Normal stress myocardial perfusion.    There is normal isotope uptake at stress and rest. There is no evidence of    myocardial ischemia or scar. 4. Cath:   None    The MCOT, echocardiogram, stress test, and coronary angiography/PCI were reviewed by myself and used for my plan of care. Procedures:  1. Successful cardioversion of afib to SR on 19 with Dr. Patrick Barr      Assessment/Plan:     Atrial fibrillation, Persistent  -Persistent  -First seen on EKG 10/25/18 14:44:21 afib v-rate 100 bpm.  -On Lopressor 25 mg BID for rate control.  -Currently on amiodarone 200 mg daily on 19. Stop amiodarone today since it will eventually confound our ablation procedure. Check EKG in one week and if still in NSR will start Flecainide 50 mg BID as a shorter-acting anti-arrhythmic medication for rhythm control between now and the time of the ablation.    -AST 99,  (19)  -TSH 1.29 (19)  -[id number] Score 7 (CHF, HTN, AGE, CVA, female gender)  -On Eliquis 5 mg BID for  thromboembolic risk reduction.  -Tolerating well no signs or symptoms of abnormal bruising or bleeding.  -s/p DCCV on 9/11/19  -EKG today shows SR.  -We educated the patient that atrial fibrillation is a progressive disease, with more frequent episodes that will ensue. Subsequent episodes usually become more sustained to the extent that many individuals would then develop persistent atrial fibrillation. Once persistence is reached, permanent atrial fibrillation is inevitable. We discussed different management options for atrial fibrillation including their risks and benefits. These options include use of cardioversion (mainly for persisting atrial fibrillation or atrial flutter) which provides an effective immediate therapy with success rates of 75% or higher, but it provides no short nor long term efficacy. Anti-arrhythmic medications provide a very effective short term therapy, but even with our most potent anti-arrhythmic medication there is limited long term efficacy (clinical studies have shown that 40% of patients remain atrial fibrillation-free after 4 years of follow-up after starting one of the more powerful anti-arrhythmic medication (amiodarone), and, if extrapolated, may have further diminishing success as time goes on). Atrial fibrillation ablation is a potentially curative therapy with very reasonable success rate after a first time procedure and with improving success rates with subsequent procedures. The risks, benefits and alternatives of the atrial fibrillation ablation procedure were discussed with the patient. The risks including, but not limited to, bleeding, infection, radiation exposure, injury to vascular, cardiac and surrounding structures (including pneumothorax), stroke, cardiac perforation, tamponade, need for emergent open heart surgery, need for pacemaker implantation, injury to the phrenic nerve, injury to the esophagus, myocardial infarction and death were discussed in detail.      The patient would

## 2019-09-25 NOTE — LETTER
[address]   Cardiac Electrophysiology Consultation   Date: 9/25/2019  Reason for Consultation: Atrial fibrillation  Consult Requesting Physician: Maribeth Atkinson MD    Chief Complaint:   Chief Complaint   Patient presents with    Atrial Fibrillation     discuss afib ablation - s/p DCCV        HPI: Clarisse Easton is a 66 y.o. female with a past medical history signicant for HLD, HTN, CVA in 2004, PAF, AFIB RVR, and diastolic CHF. She [facility]-7/5/19 after presenting to ED with c/o shortness of breath and chest pain. She was found to be in afib with RVR v-rate 129 bpm. She underwent successful cardioversion on 9/11/19 with Dr. Leydi Gaytan. Interval History: Today, she present to office for management of atrial fibrillation. She reports that she will experience occasionally have episodes where she can feels like her heart is racing and palpitations. She is compliant with her medications and tolerating them well. She denies chest pain/pressure, tightness, edema, shortness of breath, lightheadedness, dizziness, syncope, presyncope,  PND or orthopnea.      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        Past Surgical History:   Procedure Laterality Date    BREAST SURGERY Left     lumpectomy    COLONOSCOPY N/A 6/18/2019    COLONOSCOPY POLYPECTOMY COLD BIOPSY performed by Avery Kay MD at [address]      from left breast    CYST REMOVAL  9/07    scalp cyst excision    HYSTERECTOMY  age 29    OVARIAN CYST REMOVAL Right     Age 24    UPPER GASTROINTESTINAL ENDOSCOPY N/A 1/16/2019    EGD ESOPHAGOGASTRODUODENOSCOPY performed by Avery Kay MD at [facility] for pacemaker implantation, injury to the phrenic nerve, injury to the esophagus, myocardial infarction and death were discussed in detail. The patient would like some time to deliberate further regarding procedure. If he wishes to proceed, she will contact our nurse, Karol Minor at which time we will schedule for a radiofrequency ablation with [employer]o Navigation system with a BLOSSOM procedure immediately prior to this ablation. A cardiac CTA will be ordered for pulmonary vein mapping prior to this procedure. We will hold Eliquis or 12 hours prior to procedure. We will order BMP, CBC, PT/INR, and Type & Screen prior to the procedure. CVA, previous history:  -currently on Eliquis 5mg BID  -no recurrence of CVA. Thank you for allowing me to participate in the care of Pretty Bermeo. All questions and concerns were addressed to the patient/family. Alternatives to my treatment were discussed. Follow up in one week with Christine Marino NP for EKG and medication changes. Follow up 3 months after afib ablation if she decides to proceed. Schedule follow up with Dr. Mari Aguilar for management of HLD and HTN last seen on 11/12/18. This note was scribed in the presence of Jemima Bucio MD by Azar Andino RN. The scribe's documentation has been prepared under my direction and personally reviewed by me in its entirety. I confirm that the note above accurately reflects all work, physical examination, the discussion of treatments and procedures, and medical decision making performed by me.       Jemima Bucio MD, MS, [facility], [facility]  Cardiac Electrophysiology  [address]  [address] [facility], [address] [place] [address] Dimitrios Roth [facility] [address]  [phone]

## 2019-09-27 RX ORDER — FUROSEMIDE 40 MG/1
40 TABLET ORAL DAILY
Qty: 60 TABLET | Refills: 0 | Status: SHIPPED | OUTPATIENT
Start: 2019-09-27 | End: 2019-09-27 | Stop reason: SDUPTHER

## 2019-09-27 RX ORDER — FUROSEMIDE 40 MG/1
40 TABLET ORAL DAILY
Qty: 90 TABLET | Refills: 1 | Status: CANCELLED | OUTPATIENT
Start: 2019-09-27

## 2019-09-27 RX ORDER — FUROSEMIDE 40 MG/1
40 TABLET ORAL DAILY
Qty: 60 TABLET | Refills: 0 | Status: SHIPPED | OUTPATIENT
Start: 2019-09-27 | End: 2019-11-13 | Stop reason: SDUPTHER

## 2019-10-03 ENCOUNTER — OFFICE VISIT (OUTPATIENT)
Dept: CARDIOLOGY CLINIC | Age: 79
End: 2019-10-03
Payer: MEDICARE

## 2019-10-03 VITALS
WEIGHT: 150 LBS | SYSTOLIC BLOOD PRESSURE: 128 MMHG | HEIGHT: 62 IN | DIASTOLIC BLOOD PRESSURE: 64 MMHG | HEART RATE: 63 BPM | OXYGEN SATURATION: 98 % | BODY MASS INDEX: 27.6 KG/M2

## 2019-10-03 DIAGNOSIS — I50.32 CHRONIC DIASTOLIC (CONGESTIVE) HEART FAILURE (HCC): ICD-10-CM

## 2019-10-03 DIAGNOSIS — I48.19 PERSISTENT ATRIAL FIBRILLATION (HCC): Primary | ICD-10-CM

## 2019-10-03 PROCEDURE — G8598 ASA/ANTIPLAT THER USED: HCPCS | Performed by: NURSE PRACTITIONER

## 2019-10-03 PROCEDURE — G8427 DOCREV CUR MEDS BY ELIG CLIN: HCPCS | Performed by: NURSE PRACTITIONER

## 2019-10-03 PROCEDURE — 1123F ACP DISCUSS/DSCN MKR DOCD: CPT | Performed by: NURSE PRACTITIONER

## 2019-10-03 PROCEDURE — 93000 ELECTROCARDIOGRAM COMPLETE: CPT | Performed by: NURSE PRACTITIONER

## 2019-10-03 PROCEDURE — 1036F TOBACCO NON-USER: CPT | Performed by: NURSE PRACTITIONER

## 2019-10-03 PROCEDURE — 99214 OFFICE O/P EST MOD 30 MIN: CPT | Performed by: NURSE PRACTITIONER

## 2019-10-03 PROCEDURE — 4040F PNEUMOC VAC/ADMIN/RCVD: CPT | Performed by: NURSE PRACTITIONER

## 2019-10-03 PROCEDURE — G8417 CALC BMI ABV UP PARAM F/U: HCPCS | Performed by: NURSE PRACTITIONER

## 2019-10-03 PROCEDURE — G8484 FLU IMMUNIZE NO ADMIN: HCPCS | Performed by: NURSE PRACTITIONER

## 2019-10-03 PROCEDURE — G8399 PT W/DXA RESULTS DOCUMENT: HCPCS | Performed by: NURSE PRACTITIONER

## 2019-10-03 PROCEDURE — 1090F PRES/ABSN URINE INCON ASSESS: CPT | Performed by: NURSE PRACTITIONER

## 2019-10-03 RX ORDER — AMIODARONE HYDROCHLORIDE 200 MG/1
200 TABLET ORAL DAILY
Qty: 30 TABLET | Refills: 3
Start: 2019-10-03 | End: 2019-10-03

## 2019-10-03 RX ORDER — AMIODARONE HYDROCHLORIDE 200 MG/1
100 TABLET ORAL DAILY
Qty: 30 TABLET | Refills: 3
Start: 2019-10-03 | End: 2020-03-27 | Stop reason: SDUPTHER

## 2019-10-23 ENCOUNTER — CARE COORDINATION (OUTPATIENT)
Dept: CARE COORDINATION | Age: 79
End: 2019-10-23

## 2019-11-06 DIAGNOSIS — I48.19 PERSISTENT ATRIAL FIBRILLATION (HCC): ICD-10-CM

## 2019-11-06 LAB
ALBUMIN SERPL-MCNC: 4.1 G/DL (ref 3.4–5)
ALP BLD-CCNC: 74 U/L (ref 40–129)
ALT SERPL-CCNC: 18 U/L (ref 10–40)
AST SERPL-CCNC: 20 U/L (ref 15–37)
BILIRUB SERPL-MCNC: <0.2 MG/DL (ref 0–1)
BILIRUBIN DIRECT: <0.2 MG/DL (ref 0–0.3)
BILIRUBIN, INDIRECT: NORMAL MG/DL (ref 0–1)
TOTAL PROTEIN: 6.6 G/DL (ref 6.4–8.2)

## 2019-11-13 RX ORDER — FUROSEMIDE 40 MG/1
TABLET ORAL
Qty: 90 TABLET | Refills: 1 | Status: SHIPPED | OUTPATIENT
Start: 2019-11-13 | End: 2020-01-27 | Stop reason: SDUPTHER

## 2019-11-25 ENCOUNTER — HOSPITAL ENCOUNTER (OUTPATIENT)
Dept: MAMMOGRAPHY | Age: 79
Discharge: HOME | End: 2019-11-30
Payer: MEDICARE

## 2019-11-25 DIAGNOSIS — Z12.31 VISIT FOR SCREENING MAMMOGRAM: ICD-10-CM

## 2019-11-25 PROCEDURE — 77063 BREAST TOMOSYNTHESIS BI: CPT

## 2019-12-04 ENCOUNTER — TELEPHONE (OUTPATIENT)
Dept: INTERNAL MEDICINE CLINIC | Age: 79
End: 2019-12-04

## 2019-12-04 DIAGNOSIS — E78.2 MIXED HYPERLIPIDEMIA: Primary | ICD-10-CM

## 2019-12-04 DIAGNOSIS — I48.19 PERSISTENT ATRIAL FIBRILLATION (HCC): ICD-10-CM

## 2019-12-04 DIAGNOSIS — R73.9 HYPERGLYCEMIA: ICD-10-CM

## 2019-12-04 RX ORDER — ATORVASTATIN CALCIUM 20 MG/1
TABLET, FILM COATED ORAL
Qty: 90 TABLET | Refills: 1 | Status: SHIPPED | OUTPATIENT
Start: 2019-12-04 | End: 2020-05-28

## 2019-12-09 ENCOUNTER — OFFICE VISIT (OUTPATIENT)
Dept: INTERNAL MEDICINE CLINIC | Age: 79
End: 2019-12-09
Payer: MEDICARE

## 2019-12-09 VITALS
WEIGHT: 151.4 LBS | RESPIRATION RATE: 16 BRPM | DIASTOLIC BLOOD PRESSURE: 52 MMHG | SYSTOLIC BLOOD PRESSURE: 136 MMHG | TEMPERATURE: 98.4 F | HEART RATE: 64 BPM | OXYGEN SATURATION: 96 % | BODY MASS INDEX: 27.69 KG/M2

## 2019-12-09 DIAGNOSIS — R73.9 HYPERGLYCEMIA: ICD-10-CM

## 2019-12-09 DIAGNOSIS — I48.0 PAROXYSMAL ATRIAL FIBRILLATION (HCC): Primary | ICD-10-CM

## 2019-12-09 DIAGNOSIS — R09.89 BRUIT OF RIGHT CAROTID ARTERY: ICD-10-CM

## 2019-12-09 DIAGNOSIS — E78.2 MIXED HYPERLIPIDEMIA: ICD-10-CM

## 2019-12-09 PROCEDURE — G8598 ASA/ANTIPLAT THER USED: HCPCS | Performed by: INTERNAL MEDICINE

## 2019-12-09 PROCEDURE — 1036F TOBACCO NON-USER: CPT | Performed by: INTERNAL MEDICINE

## 2019-12-09 PROCEDURE — G8484 FLU IMMUNIZE NO ADMIN: HCPCS | Performed by: INTERNAL MEDICINE

## 2019-12-09 PROCEDURE — G8399 PT W/DXA RESULTS DOCUMENT: HCPCS | Performed by: INTERNAL MEDICINE

## 2019-12-09 PROCEDURE — 1123F ACP DISCUSS/DSCN MKR DOCD: CPT | Performed by: INTERNAL MEDICINE

## 2019-12-09 PROCEDURE — 99214 OFFICE O/P EST MOD 30 MIN: CPT | Performed by: INTERNAL MEDICINE

## 2019-12-09 PROCEDURE — G8427 DOCREV CUR MEDS BY ELIG CLIN: HCPCS | Performed by: INTERNAL MEDICINE

## 2019-12-09 PROCEDURE — 1090F PRES/ABSN URINE INCON ASSESS: CPT | Performed by: INTERNAL MEDICINE

## 2019-12-09 PROCEDURE — 4040F PNEUMOC VAC/ADMIN/RCVD: CPT | Performed by: INTERNAL MEDICINE

## 2019-12-09 PROCEDURE — G8417 CALC BMI ABV UP PARAM F/U: HCPCS | Performed by: INTERNAL MEDICINE

## 2019-12-09 RX ORDER — VITAMIN B COMPLEX
1 TABLET ORAL DAILY
COMMUNITY

## 2019-12-17 ENCOUNTER — OFFICE VISIT (OUTPATIENT)
Dept: CARDIOLOGY CLINIC | Age: 79
End: 2019-12-17
Payer: MEDICARE

## 2019-12-17 VITALS
SYSTOLIC BLOOD PRESSURE: 136 MMHG | BODY MASS INDEX: 27.79 KG/M2 | HEIGHT: 62 IN | WEIGHT: 151 LBS | HEART RATE: 62 BPM | DIASTOLIC BLOOD PRESSURE: 74 MMHG | OXYGEN SATURATION: 98 %

## 2019-12-17 DIAGNOSIS — I10 ESSENTIAL HYPERTENSION: ICD-10-CM

## 2019-12-17 DIAGNOSIS — E78.2 MIXED HYPERLIPIDEMIA: ICD-10-CM

## 2019-12-17 DIAGNOSIS — R06.02 SOB (SHORTNESS OF BREATH): ICD-10-CM

## 2019-12-17 DIAGNOSIS — I48.0 PAROXYSMAL ATRIAL FIBRILLATION (HCC): Primary | ICD-10-CM

## 2019-12-17 PROCEDURE — G8399 PT W/DXA RESULTS DOCUMENT: HCPCS | Performed by: INTERNAL MEDICINE

## 2019-12-17 PROCEDURE — G8417 CALC BMI ABV UP PARAM F/U: HCPCS | Performed by: INTERNAL MEDICINE

## 2019-12-17 PROCEDURE — 4040F PNEUMOC VAC/ADMIN/RCVD: CPT | Performed by: INTERNAL MEDICINE

## 2019-12-17 PROCEDURE — 99214 OFFICE O/P EST MOD 30 MIN: CPT | Performed by: INTERNAL MEDICINE

## 2019-12-17 PROCEDURE — 93000 ELECTROCARDIOGRAM COMPLETE: CPT | Performed by: INTERNAL MEDICINE

## 2019-12-17 PROCEDURE — 1090F PRES/ABSN URINE INCON ASSESS: CPT | Performed by: INTERNAL MEDICINE

## 2019-12-17 PROCEDURE — 1036F TOBACCO NON-USER: CPT | Performed by: INTERNAL MEDICINE

## 2019-12-17 PROCEDURE — G8484 FLU IMMUNIZE NO ADMIN: HCPCS | Performed by: INTERNAL MEDICINE

## 2019-12-17 PROCEDURE — 1123F ACP DISCUSS/DSCN MKR DOCD: CPT | Performed by: INTERNAL MEDICINE

## 2019-12-17 PROCEDURE — G8427 DOCREV CUR MEDS BY ELIG CLIN: HCPCS | Performed by: INTERNAL MEDICINE

## 2019-12-17 PROCEDURE — G8598 ASA/ANTIPLAT THER USED: HCPCS | Performed by: INTERNAL MEDICINE

## 2019-12-20 RX ORDER — POTASSIUM CHLORIDE 20 MEQ/1
TABLET, EXTENDED RELEASE ORAL
Qty: 90 TABLET | Refills: 1 | Status: SHIPPED | OUTPATIENT
Start: 2019-12-20 | End: 2020-06-23 | Stop reason: SDUPTHER

## 2019-12-21 DIAGNOSIS — R73.9 HYPERGLYCEMIA: ICD-10-CM

## 2019-12-21 DIAGNOSIS — E78.2 MIXED HYPERLIPIDEMIA: ICD-10-CM

## 2019-12-21 DIAGNOSIS — I48.19 PERSISTENT ATRIAL FIBRILLATION (HCC): ICD-10-CM

## 2019-12-23 ENCOUNTER — TELEPHONE (OUTPATIENT)
Dept: INTERNAL MEDICINE CLINIC | Age: 79
End: 2019-12-23

## 2019-12-23 LAB
REASON FOR REJECTION: NORMAL
REJECTED TEST: NORMAL

## 2019-12-27 ASSESSMENT — ENCOUNTER SYMPTOMS
NAUSEA: 0
SHORTNESS OF BREATH: 0
COUGH: 0
VOMITING: 0
ABDOMINAL PAIN: 0
DIARRHEA: 0
BLOOD IN STOOL: 0

## 2020-01-06 ENCOUNTER — CARE COORDINATION (OUTPATIENT)
Dept: CARE COORDINATION | Age: 80
End: 2020-01-06

## 2020-01-27 RX ORDER — FUROSEMIDE 40 MG/1
TABLET ORAL
Qty: 180 TABLET | Refills: 1 | Status: SHIPPED | OUTPATIENT
Start: 2020-01-27 | End: 2020-07-28

## 2020-01-27 NOTE — TELEPHONE ENCOUNTER
Pt wants for directions on furosemide to be updated. She currently takes 40mg daily but does take an extra tablet occasionally if weight goes up 1-2lbs. Currently Rx is only for 1 daily and does not have extra when she needs to take extra.

## 2020-01-27 NOTE — TELEPHONE ENCOUNTER
Patient was told by pharmacy to have extra water pills. Patient will not be able to have anymore until the 3rd. Last pill will be Wednesday. Patient cannot be without or she will gain water weight. Patient was instructed in the past if she gained 2 pounds of water weight, she will have to go to the emergency room. Patient wants to avoid all of this. Please advise patient and/or send the best prescription for patient to have access to much needed water pill.    Best Contact: [phone]    Pharmacy: [facility]  [address] [facility], [address] [place] [address]

## 2020-02-05 ENCOUNTER — CARE COORDINATION (OUTPATIENT)
Dept: CARE COORDINATION | Age: 80
End: 2020-02-05

## 2020-02-09 ENCOUNTER — APPOINTMENT (OUTPATIENT)
Dept: CT IMAGING | Age: 80
End: 2020-02-09
Payer: MEDICARE

## 2020-02-09 ENCOUNTER — HOSPITAL ENCOUNTER (EMERGENCY)
Age: 80
Discharge: HOME | End: 2020-02-09
Attending: EMERGENCY MEDICINE
Payer: MEDICARE

## 2020-02-09 VITALS
SYSTOLIC BLOOD PRESSURE: 162 MMHG | RESPIRATION RATE: 14 BRPM | TEMPERATURE: 98.3 F | DIASTOLIC BLOOD PRESSURE: 53 MMHG | BODY MASS INDEX: 28.8 KG/M2 | OXYGEN SATURATION: 100 % | HEART RATE: 54 BPM | WEIGHT: 156.53 LBS | HEIGHT: 62 IN

## 2020-02-09 LAB
A/G RATIO: 1.4 (ref 1.1–2.2)
ALBUMIN SERPL-MCNC: 4 G/DL (ref 3.4–5)
ALP BLD-CCNC: 56 U/L (ref 40–129)
ALT SERPL-CCNC: 21 U/L (ref 10–40)
ANION GAP SERPL CALCULATED.3IONS-SCNC: 11 MMOL/L (ref 3–16)
AST SERPL-CCNC: 17 U/L (ref 15–37)
BACTERIA: ABNORMAL /HPF
BASOPHILS ABSOLUTE: 0.1 K/UL (ref 0–0.2)
BASOPHILS RELATIVE PERCENT: 0.8 %
BILIRUB SERPL-MCNC: <0.2 MG/DL (ref 0–1)
BILIRUBIN URINE: NEGATIVE
BLOOD, URINE: NEGATIVE
BUN BLDV-MCNC: 40 MG/DL (ref 7–20)
CALCIUM SERPL-MCNC: 9.3 MG/DL (ref 8.3–10.6)
CHLORIDE BLD-SCNC: 100 MMOL/L (ref 99–110)
CLARITY: ABNORMAL
CO2: 27 MMOL/L (ref 21–32)
COLOR: YELLOW
CREAT SERPL-MCNC: 1 MG/DL (ref 0.6–1.2)
EOSINOPHILS ABSOLUTE: 0.3 K/UL (ref 0–0.6)
EOSINOPHILS RELATIVE PERCENT: 3.5 %
EPITHELIAL CELLS, UA: ABNORMAL /HPF
GFR AFRICAN AMERICAN: >60
GFR NON-AFRICAN AMERICAN: 53
GLOBULIN: 2.8 G/DL
GLUCOSE BLD-MCNC: 88 MG/DL (ref 70–99)
GLUCOSE URINE: NEGATIVE MG/DL
HCT VFR BLD CALC: 36.9 % (ref 36–48)
HEMOGLOBIN: 12 G/DL (ref 12–16)
KETONES, URINE: NEGATIVE MG/DL
LEUKOCYTE ESTERASE, URINE: ABNORMAL
LIPASE: 31 U/L (ref 13–60)
LYMPHOCYTES ABSOLUTE: 2.2 K/UL (ref 1–5.1)
LYMPHOCYTES RELATIVE PERCENT: 28.9 %
MCH RBC QN AUTO: 30.1 PG (ref 26–34)
MCHC RBC AUTO-ENTMCNC: 32.5 G/DL (ref 31–36)
MCV RBC AUTO: 92.7 FL (ref 80–100)
MICROSCOPIC EXAMINATION: YES
MONOCYTES ABSOLUTE: 0.6 K/UL (ref 0–1.3)
MONOCYTES RELATIVE PERCENT: 8.2 %
NEUTROPHILS ABSOLUTE: 4.3 K/UL (ref 1.7–7.7)
NEUTROPHILS RELATIVE PERCENT: 58.6 %
NITRITE, URINE: NEGATIVE
PDW BLD-RTO: 13.9 % (ref 12.4–15.4)
PH UA: 7 (ref 5–8)
PLATELET # BLD: 179 K/UL (ref 135–450)
PMV BLD AUTO: 10 FL (ref 5–10.5)
POTASSIUM SERPL-SCNC: 4.7 MMOL/L (ref 3.5–5.1)
PROTEIN UA: NEGATIVE MG/DL
RBC # BLD: 3.98 M/UL (ref 4–5.2)
RBC UA: ABNORMAL /HPF (ref 0–2)
SODIUM BLD-SCNC: 138 MMOL/L (ref 136–145)
SPECIFIC GRAVITY UA: 1.02 (ref 1–1.03)
TOTAL PROTEIN: 6.8 G/DL (ref 6.4–8.2)
URINE REFLEX TO CULTURE: YES
URINE TYPE: ABNORMAL
UROBILINOGEN, URINE: 0.2 E.U./DL
WBC # BLD: 7.5 K/UL (ref 4–11)
WBC UA: ABNORMAL /HPF (ref 0–5)

## 2020-02-09 PROCEDURE — 80053 COMPREHEN METABOLIC PANEL: CPT

## 2020-02-09 PROCEDURE — 87086 URINE CULTURE/COLONY COUNT: CPT

## 2020-02-09 PROCEDURE — 83690 ASSAY OF LIPASE: CPT

## 2020-02-09 PROCEDURE — 81001 URINALYSIS AUTO W/SCOPE: CPT

## 2020-02-09 PROCEDURE — 85025 COMPLETE CBC W/AUTO DIFF WBC: CPT

## 2020-02-09 PROCEDURE — 74176 CT ABD & PELVIS W/O CONTRAST: CPT

## 2020-02-09 PROCEDURE — 99283 EMERGENCY DEPT VISIT LOW MDM: CPT

## 2020-02-09 PROCEDURE — 36415 COLL VENOUS BLD VENIPUNCTURE: CPT

## 2020-02-09 RX ORDER — CIPROFLOXACIN 0.5 MG/.25ML
0.5 SOLUTION/ DROPS AURICULAR (OTIC) 2 TIMES DAILY
Qty: 1 EACH | Refills: 0 | Status: SHIPPED | OUTPATIENT
Start: 2020-02-09 | End: 2020-02-16

## 2020-02-09 RX ORDER — CYCLOBENZAPRINE HCL 10 MG
10 TABLET ORAL 3 TIMES DAILY PRN
Qty: 20 TABLET | Refills: 0 | Status: SHIPPED | OUTPATIENT
Start: 2020-02-09 | End: 2020-02-16

## 2020-02-09 RX ORDER — FLUTICASONE PROPIONATE 50 MCG
SPRAY, SUSPENSION (ML) NASAL
COMMUNITY
Start: 2020-01-20 | End: 2020-11-10 | Stop reason: SDUPTHER

## 2020-02-09 ASSESSMENT — PAIN DESCRIPTION - FREQUENCY: FREQUENCY: INTERMITTENT

## 2020-02-09 ASSESSMENT — PAIN - FUNCTIONAL ASSESSMENT
PAIN_FUNCTIONAL_ASSESSMENT: ACTIVITIES ARE NOT PREVENTED
PAIN_FUNCTIONAL_ASSESSMENT: 0-10

## 2020-02-09 ASSESSMENT — PAIN SCALES - GENERAL
PAINLEVEL_OUTOF10: 3
PAINLEVEL_OUTOF10: 8
PAINLEVEL_OUTOF10: 4

## 2020-02-09 ASSESSMENT — PAIN DESCRIPTION - ORIENTATION: ORIENTATION: LOWER;MID

## 2020-02-09 ASSESSMENT — PAIN DESCRIPTION - DESCRIPTORS: DESCRIPTORS: ACHING

## 2020-02-09 ASSESSMENT — PAIN DESCRIPTION - LOCATION: LOCATION: BACK

## 2020-02-09 ASSESSMENT — PAIN DESCRIPTION - PROGRESSION: CLINICAL_PROGRESSION: GRADUALLY WORSENING

## 2020-02-09 NOTE — ED NOTES
Exam per Dr. Dipesh Luciano. Patient here for mid to low back pain onset 5 days ago. No known injury. States she gets awakened by pain. Has been taking Tylenol but it doesn't seem to help. Maxwell pain , denies urgency/ no frequency with urination. Alert and oriented.  Calm , pleasant and cooperative     Anai Mccormick, RN  02/09/20 7417

## 2020-02-09 NOTE — ED PROVIDER NOTES
the absence of a radiologist:  [x] Radiologist's Report Reviewed:     CT ABDOMEN PELVIS WO CONTRAST Additional Contrast? None (Final result)   Result time 02/09/20 15:04:52   Final result by Belkys Mejía MD (02/09/20 15:04:52)                Impression:    Heterogeneous lesion projecting posteriorly off the lower pole of the left  kidney, likely angiomyolipoma. [place] is nonspecific stranding in the  adjacent fat.  There is no definitive acute internal hemorrhage. Diverticulosis in the sigmoid colon associated with incomplete distention. Although there is no definitive inflammatory change, mild colitis or  diverticulitis would be difficult to exclude completely    Mild fluid-filled small bowel loops in the abdomen and pelvis.  Ileus is  favored. [place] are no findings diagnostic of a bowel obstruction. Narrative:    EXAMINATION:  CT OF THE ABDOMEN AND PELVIS WITHOUT CONTRAST 2/9/2020 2:43 pm    TECHNIQUE:  CT of the abdomen and pelvis was performed without the administration of  intravenous contrast. Multiplanar reformatted images are provided for review. Dose modulation, iterative reconstruction, and/or weight based adjustment of  the mA/kV was utilized to reduce the radiation dose to as low as reasonably  achievable. COMPARISON:  None.     HISTORY:  ORDERING SYSTEM PROVIDED HISTORY: left flank pain  TECHNOLOGIST PROVIDED HISTORY:  Reason for exam:->left flank pain  Additional Contrast?->None  Reason for Exam: left flank /left side low back pain for 5 days hx  hysterectomy  Acuity: Acute  Type of Exam: Initial    FINDINGS:  Lower Chest: Vascular calcifications are noted.  Partially calcified lymph  nodes are noted.  Lung bases are clear.  Bilateral breast calcifications are  noted.  Hiatal hernia is noted    Organs: Limited noncontrast images of the liver, spleen, pancreas, and  gallbladder demonstrate no acute abnormality.  Right adrenal gland is normal.  Low-density mass in the left adrenal gland is noted.  Hounsfield units are  compatible with an adenoma.  There is no hydronephrosis.  No renal calculi  are noted.  Heterogeneous lesion projects posteriorly off the lower pole of  the left kidney measuring approximately 2 cm.  Average Hounsfield units are  approximately 18.  Slight stranding in the adjacent fat is noted. GI/Bowel: Stool is noted throughout the colon.  No disproportionate bowel  distention is noted. Pelvis: Numerous diverticula are noted throughout the sigmoid colon.  There  is incomplete distention of the sigmoid colon as well.  There is no  definitive adjacent inflammatory change.  Bladder is collapsed.  Pelvic  phleboliths are noted.  Multiple fluid-filled small bowel loops in the pelvis  are noted. Peritoneum/Retroperitoneum: Multiple vascular calcifications are noted  without aneurysm.  Scattered small lymph nodes are noted without pathologic  enlargement    Bones/Soft Tissues: Degenerative changes throughout the lumbar spine are  noted.  Multiple vacuum discs are noted.  There is no acute fracture. Degenerative changes are noted in the SI joints.  Small inguinal nodes are  present. Canal detail is limited.  Multilevel disc bulging in the lumbar spine is noted                      EKG (if obtained): (All EKG's are interpreted by myself in the absence of a cardiologist)  Initial EKG on my interpretation shows n/a    MDM:  Differential diagnosis: Renal stone, pyelonephritis, cauda equina syndrome, epidural abscess, back pain, otitis media    CBC/CMP/Lipase wnl    I spoke to the radiologist regarding the finding and he states that he does not believe there is any evidence of hemorrhage. He specifically applied Hounsfield units and believes that this is a angiomyolipoma without evidence of hemorrhage-additionally her H&H and blood pressure do not show any evidence of life-threatening hemorrhage. .  I told patient regarding all incidental findings.   The colonic mis-transcribed.)    MD Josue Dumas MD  02/09/20 2536

## 2020-02-10 ENCOUNTER — CARE COORDINATION (OUTPATIENT)
Dept: CARE COORDINATION | Age: 80
End: 2020-02-10

## 2020-02-11 LAB — URINE CULTURE, ROUTINE: NORMAL

## 2020-02-20 ENCOUNTER — CARE COORDINATION (OUTPATIENT)
Dept: CARE COORDINATION | Age: 80
End: 2020-02-20

## 2020-03-09 ENCOUNTER — OFFICE VISIT (OUTPATIENT)
Dept: INTERNAL MEDICINE CLINIC | Age: 80
End: 2020-03-09
Payer: MEDICARE

## 2020-03-09 ENCOUNTER — CARE COORDINATION (OUTPATIENT)
Dept: CARE COORDINATION | Age: 80
End: 2020-03-09

## 2020-03-09 VITALS
WEIGHT: 153 LBS | SYSTOLIC BLOOD PRESSURE: 124 MMHG | DIASTOLIC BLOOD PRESSURE: 62 MMHG | RESPIRATION RATE: 16 BRPM | TEMPERATURE: 98.1 F | OXYGEN SATURATION: 98 % | BODY MASS INDEX: 27.98 KG/M2 | HEART RATE: 72 BPM

## 2020-03-09 PROCEDURE — G8417 CALC BMI ABV UP PARAM F/U: HCPCS | Performed by: INTERNAL MEDICINE

## 2020-03-09 PROCEDURE — G8399 PT W/DXA RESULTS DOCUMENT: HCPCS | Performed by: INTERNAL MEDICINE

## 2020-03-09 PROCEDURE — G8484 FLU IMMUNIZE NO ADMIN: HCPCS | Performed by: INTERNAL MEDICINE

## 2020-03-09 PROCEDURE — 1036F TOBACCO NON-USER: CPT | Performed by: INTERNAL MEDICINE

## 2020-03-09 PROCEDURE — 1123F ACP DISCUSS/DSCN MKR DOCD: CPT | Performed by: INTERNAL MEDICINE

## 2020-03-09 PROCEDURE — 1090F PRES/ABSN URINE INCON ASSESS: CPT | Performed by: INTERNAL MEDICINE

## 2020-03-09 PROCEDURE — 4040F PNEUMOC VAC/ADMIN/RCVD: CPT | Performed by: INTERNAL MEDICINE

## 2020-03-09 PROCEDURE — G8427 DOCREV CUR MEDS BY ELIG CLIN: HCPCS | Performed by: INTERNAL MEDICINE

## 2020-03-09 PROCEDURE — 99213 OFFICE O/P EST LOW 20 MIN: CPT | Performed by: INTERNAL MEDICINE

## 2020-03-09 RX ORDER — FLUOCINOLONE ACETONIDE 0.1 MG/ML
SOLUTION TOPICAL
Qty: 60 ML | Refills: 0 | Status: SHIPPED | OUTPATIENT
Start: 2020-03-09 | End: 2020-11-10 | Stop reason: SDUPTHER

## 2020-03-09 RX ORDER — AMOXICILLIN 500 MG/1
500 CAPSULE ORAL 2 TIMES DAILY
Qty: 20 CAPSULE | Refills: 0 | Status: SHIPPED | OUTPATIENT
Start: 2020-03-09 | End: 2020-03-19

## 2020-03-09 RX ORDER — CHLORAL HYDRATE 500 MG
3000 CAPSULE ORAL DAILY
COMMUNITY

## 2020-03-09 ASSESSMENT — PATIENT HEALTH QUESTIONNAIRE - PHQ9
SUM OF ALL RESPONSES TO PHQ QUESTIONS 1-9: 0
SUM OF ALL RESPONSES TO PHQ9 QUESTIONS 1 & 2: 0
1. LITTLE INTEREST OR PLEASURE IN DOING THINGS: 0
SUM OF ALL RESPONSES TO PHQ QUESTIONS 1-9: 0
2. FEELING DOWN, DEPRESSED OR HOPELESS: 0

## 2020-03-09 NOTE — PATIENT INSTRUCTIONS
Please call your pharmacy if you need any refills of your medication(s). Please call our office at [phone] if you don't hear from us about your test results. Bring an accurate list of your medications with you at every appointment to ensure that we have the correct information.     Our office hours are: Monday - Friday 8:30 am- 5 pm    Phone lines turn on at 8:30 am

## 2020-03-09 NOTE — PROGRESS NOTES
SUBJECTIVE:  Jes Valera is a 78 y.o. female being evaluated for:    Chief Complaint   Patient presents with    Ear Problem     Patient states that she can hear her heart pounding in both ears x3 months. HPI   Feeling heart beating in her ear Pain in her left ear  Pain in left side of back Annoying problem with ear  Seen in ed and treated with ciprodex Treated back and took Tylenol and back is better     Allergies   Allergen Reactions    Lisinopril      cough     Current Outpatient Medications   Medication Sig Dispense Refill    Omega-3 Fatty Acids (FISH OIL) 1000 MG CAPS Take 3,000 mg by mouth daily      fluocinolone acetonide (SYNALAR) 0.01 % external solution Apply to affected area of ear canal twice daily . 60 mL 0    fluticasone (FLONASE) 50 MCG/ACT nasal spray USE 2 SPRAYS IN EACH NOSTRIL DAILY      furosemide (LASIX) 40 MG tablet TAKE 1 TABLET BY MOUTH ONCE DAILY & 1 extra tablet as needed if weight goes up 2lb. 180 tablet 1    potassium chloride (KLOR-CON M) 20 MEQ extended release tablet TAKE ONE TABLET BY MOUTH ONCE DAILY 90 tablet 1    Coenzyme Q10 (COQ10) 100 MG CAPS Take by mouth daily      atorvastatin (LIPITOR) 20 MG tablet TAKE ONE TABLET BY MOUTH ONCE DAILY 90 tablet 1    metoprolol tartrate (LOPRESSOR) 25 MG tablet TAKE 1 TABLET BY MOUTH TWICE DAILY 180 tablet 1    ELIQUIS 5 MG TABS tablet TAKE ONE TABLET BY MOUTH TWICE A DAY 60 tablet 5    calcium-vitamin D (OSCAL) 250-125 MG-UNIT per tablet Take 1 tablet by mouth 2 times daily.  multivitamin (THERAGRAN) per tablet Take 1 tablet by mouth daily.  amiodarone (CORDARONE) 200 MG tablet Take 0.5 tablets by mouth daily 30 tablet 3     No current facility-administered medications for this visit. Social History     Socioeconomic History    Marital status:       Spouse name: Not on file    Number of children: Not on file    Years of education: Not on file    Highest education level: Not on file ESOPHAGOGASTRODUODENOSCOPY performed by Bradford Lisa MD at [address] N/A 6/18/2019    EGD BIOPSY performed by Bradford Lisa MD at [facility]   Constitutional: Negative for activity change (working for [email or web address]), fatigue, fever and unexpected weight change. HENT: Positive for ear pain. Negative for nosebleeds. Eyes: Negative for visual disturbance. Respiratory: Negative for cough and shortness of breath. Cardiovascular: Negative for chest pain, palpitations and leg swelling. Gastrointestinal: Negative for abdominal pain, diarrhea, nausea and vomiting. No heartburn   Musculoskeletal: Positive for back pain. Neurological: Negative for dizziness, syncope, speech difficulty, weakness, light-headedness, numbness and headaches. Psychiatric/Behavioral: Negative for dysphoric mood. OBJECTIVE:  /62 (Site: Left Upper Arm, Position: Sitting, Cuff Size: Large Adult)   Pulse 72   Temp 98.1 °F (36.7 °C) (Oral)   Resp 16   Wt 153 lb (69.4 kg)   SpO2 98%   BMI 27.98 kg/m²      Body mass index is 27.98 kg/m². Physical Exam  Constitutional:       General: She is not in acute distress. Appearance: She is well-developed. HENT:      Head: Normocephalic and atraumatic. Right Ear: External ear normal.      Left Ear: External ear normal.   Eyes:      Conjunctiva/sclera: Conjunctivae normal.   Neck:      Musculoskeletal: Neck supple. Thyroid: No thyromegaly. Vascular: No carotid bruit or JVD. Comments: No tm or jvd   Cardiovascular:      Rate and Rhythm: Normal rate and regular rhythm. Heart sounds: Normal heart sounds. No murmur. No friction rub. No gallop. Pulmonary:      Effort: Pulmonary effort is normal.      Breath sounds: No wheezing or rales. Chest:      Chest wall: No tenderness. Abdominal:      General: There is no distension. Palpations: Abdomen is soft. Tenderness:  There is no abdominal tenderness. Comments: No hepatosplenomegaly   Musculoskeletal:         General: No swelling. Lymphadenopathy:      Cervical: No cervical adenopathy. Skin:     General: Skin is warm and dry. Findings: No erythema. Neurological:      Coordination: Coordination normal.   Psychiatric:         Behavior: Behavior normal.      Comments: Mildly anxious         ASSESSMENT/PLAN:    Norma Silver was seen today for ear problem. Diagnoses and all orders for this visit:    Left chronic serous otitis media  -     amoxicillin (AMOXIL) 500 MG capsule; Take 1 capsule by mouth 2 times daily for 10 days  -     Karina Cassidy MD, Otolaryngology, [facility]  -     fluocinolone acetonide (SYNALAR) 0.01 % external solution; Apply to affected area of ear canal twice daily . Paroxysmal atrial fibrillation (HCC)        Orders Placed This Encounter   Medications    amoxicillin (AMOXIL) 500 MG capsule     Sig: Take 1 capsule by mouth 2 times daily for 10 days     Dispense:  20 capsule     Refill:  0    fluocinolone acetonide (SYNALAR) 0.01 % external solution     Sig: Apply to affected area of ear canal twice daily . Dispense:  60 mL     Refill:  0        Return in about 6 months (around 9/9/2020), or if symptoms worsen or fail to improve. Patient Instructions   Please call your pharmacy if you need any refills of your medication(s). Please call our office at [phone] if you don't hear from us about your test results. Bring an accurate list of your medications with you at every appointment to ensure that we have the correct information.     Our office hours are: Monday - Friday 8:30 am- 5 pm    Phone lines turn on at 8:30 am

## 2020-03-27 RX ORDER — AMIODARONE HYDROCHLORIDE 200 MG/1
100 TABLET ORAL DAILY
Qty: 30 TABLET | Refills: 3 | Status: SHIPPED | OUTPATIENT
Start: 2020-03-27 | End: 2020-03-27

## 2020-03-29 ASSESSMENT — ENCOUNTER SYMPTOMS
VOMITING: 0
SHORTNESS OF BREATH: 0
DIARRHEA: 0
NAUSEA: 0
COUGH: 0
BACK PAIN: 1
ABDOMINAL PAIN: 0

## 2020-03-30 RX ORDER — AMIODARONE HYDROCHLORIDE 200 MG/1
TABLET ORAL
Qty: 45 TABLET | Refills: 3 | Status: SHIPPED | OUTPATIENT
Start: 2020-03-30 | End: 2020-10-12 | Stop reason: SDUPTHER

## 2020-04-21 ENCOUNTER — TELEPHONE (OUTPATIENT)
Dept: FAMILY MEDICINE CLINIC | Age: 80
End: 2020-04-21

## 2020-05-28 RX ORDER — ATORVASTATIN CALCIUM 20 MG/1
TABLET, FILM COATED ORAL
Qty: 90 TABLET | Refills: 1 | Status: SHIPPED | OUTPATIENT
Start: 2020-05-28 | End: 2020-11-10 | Stop reason: SDUPTHER

## 2020-06-15 ENCOUNTER — HOSPITAL ENCOUNTER (OUTPATIENT)
Dept: PULMONOLOGY | Age: 80
Discharge: HOME | End: 2020-06-15
Payer: MEDICARE

## 2020-06-23 RX ORDER — POTASSIUM CHLORIDE 20 MEQ/1
TABLET, EXTENDED RELEASE ORAL
Qty: 90 TABLET | Refills: 1 | Status: SHIPPED | OUTPATIENT
Start: 2020-06-23 | End: 2020-12-22

## 2020-07-28 RX ORDER — FUROSEMIDE 40 MG/1
TABLET ORAL
Qty: 180 TABLET | Refills: 1 | Status: SHIPPED | OUTPATIENT
Start: 2020-07-28 | End: 2021-09-22 | Stop reason: SDUPTHER

## 2020-10-12 RX ORDER — AMIODARONE HYDROCHLORIDE 200 MG/1
TABLET ORAL
Qty: 45 TABLET | Refills: 3 | Status: SHIPPED | OUTPATIENT
Start: 2020-10-12 | End: 2020-11-16 | Stop reason: SDUPTHER

## 2020-10-12 NOTE — TELEPHONE ENCOUNTER
Medication Refill    amiodarone (CORDARONE) 200 MG tablet  TAKE 1/2 TABLET BY MOUTH DAILY       [id number] - [address] [facility], [facility] [address] [phone]

## 2020-10-15 ENCOUNTER — HOSPITAL ENCOUNTER (OUTPATIENT)
Dept: MAMMOGRAPHY | Age: 80
Discharge: HOME | End: 2020-10-20

## 2020-10-15 PROCEDURE — 77063 BREAST TOMOSYNTHESIS BI: CPT

## 2020-10-28 ENCOUNTER — TELEPHONE (OUTPATIENT)
Dept: FAMILY MEDICINE CLINIC | Age: 80
End: 2020-10-28

## 2020-10-28 NOTE — TELEPHONE ENCOUNTER
Pt has an appt on Nov. 10, 2020 for a 6 mo check up, pt is needing blood work prior to appt. Pl advise. [phone]    Pt is wanting to get them done this Saturday b/c she will be working next week Saturday.

## 2020-10-31 DIAGNOSIS — E78.2 MIXED HYPERLIPIDEMIA: ICD-10-CM

## 2020-10-31 DIAGNOSIS — I48.0 PAROXYSMAL ATRIAL FIBRILLATION (HCC): ICD-10-CM

## 2020-10-31 DIAGNOSIS — D64.9 ANEMIA, UNSPECIFIED TYPE: ICD-10-CM

## 2020-10-31 DIAGNOSIS — I50.32 CHRONIC DIASTOLIC HEART FAILURE (HCC): ICD-10-CM

## 2020-10-31 DIAGNOSIS — I48.91 ATRIAL FIBRILLATION WITH RVR (HCC): ICD-10-CM

## 2020-10-31 DIAGNOSIS — Z79.899 MEDICATION MANAGEMENT: ICD-10-CM

## 2020-10-31 DIAGNOSIS — R73.03 PREDIABETES: ICD-10-CM

## 2020-10-31 DIAGNOSIS — I10 ESSENTIAL HYPERTENSION: ICD-10-CM

## 2020-10-31 LAB
A/G RATIO: 2 (ref 1.1–2.2)
ALBUMIN SERPL-MCNC: 4.3 G/DL (ref 3.4–5)
ALP BLD-CCNC: 68 U/L (ref 40–129)
ALT SERPL-CCNC: 16 U/L (ref 10–40)
ANION GAP SERPL CALCULATED.3IONS-SCNC: 13 MMOL/L (ref 3–16)
AST SERPL-CCNC: 16 U/L (ref 15–37)
BASOPHILS ABSOLUTE: 0 K/UL (ref 0–0.2)
BASOPHILS RELATIVE PERCENT: 0.6 %
BILIRUB SERPL-MCNC: 0.4 MG/DL (ref 0–1)
BUN BLDV-MCNC: 34 MG/DL (ref 7–20)
CALCIUM SERPL-MCNC: 9.5 MG/DL (ref 8.3–10.6)
CHLORIDE BLD-SCNC: 99 MMOL/L (ref 99–110)
CHOLESTEROL, TOTAL: 181 MG/DL (ref 0–199)
CO2: 30 MMOL/L (ref 21–32)
CREAT SERPL-MCNC: 1 MG/DL (ref 0.6–1.2)
EOSINOPHILS ABSOLUTE: 0.2 K/UL (ref 0–0.6)
EOSINOPHILS RELATIVE PERCENT: 3.4 %
GFR AFRICAN AMERICAN: >60
GFR NON-AFRICAN AMERICAN: 53
GLOBULIN: 2.2 G/DL
GLUCOSE BLD-MCNC: 96 MG/DL (ref 70–99)
HCT VFR BLD CALC: 36.6 % (ref 36–48)
HDLC SERPL-MCNC: 71 MG/DL (ref 40–60)
HEMOGLOBIN: 11.9 G/DL (ref 12–16)
LDL CHOLESTEROL CALCULATED: 85 MG/DL
LYMPHOCYTES ABSOLUTE: 2.4 K/UL (ref 1–5.1)
LYMPHOCYTES RELATIVE PERCENT: 32.5 %
MCH RBC QN AUTO: 29.3 PG (ref 26–34)
MCHC RBC AUTO-ENTMCNC: 32.6 G/DL (ref 31–36)
MCV RBC AUTO: 89.9 FL (ref 80–100)
MONOCYTES ABSOLUTE: 0.5 K/UL (ref 0–1.3)
MONOCYTES RELATIVE PERCENT: 7.1 %
NEUTROPHILS ABSOLUTE: 4.1 K/UL (ref 1.7–7.7)
NEUTROPHILS RELATIVE PERCENT: 56.4 %
PDW BLD-RTO: 14.6 % (ref 12.4–15.4)
PLATELET # BLD: 151 K/UL (ref 135–450)
PMV BLD AUTO: 11.2 FL (ref 5–10.5)
POTASSIUM SERPL-SCNC: 4.3 MMOL/L (ref 3.5–5.1)
RBC # BLD: 4.07 M/UL (ref 4–5.2)
SODIUM BLD-SCNC: 142 MMOL/L (ref 136–145)
TOTAL PROTEIN: 6.5 G/DL (ref 6.4–8.2)
TRIGL SERPL-MCNC: 123 MG/DL (ref 0–150)
TSH SERPL DL<=0.05 MIU/L-ACNC: 3.59 UIU/ML (ref 0.27–4.2)
VLDLC SERPL CALC-MCNC: 25 MG/DL
WBC # BLD: 7.2 K/UL (ref 4–11)

## 2020-11-01 LAB
ESTIMATED AVERAGE GLUCOSE: 125.5 MG/DL
HBA1C MFR BLD: 6 %

## 2020-11-03 PROBLEM — I48.91 ATRIAL FIBRILLATION (HCC): Status: RESOLVED | Noted: 2019-07-02 | Resolved: 2020-11-03

## 2020-11-10 ENCOUNTER — OFFICE VISIT (OUTPATIENT)
Dept: FAMILY MEDICINE CLINIC | Age: 80
End: 2020-11-10
Payer: MEDICARE

## 2020-11-10 VITALS
DIASTOLIC BLOOD PRESSURE: 68 MMHG | WEIGHT: 158.8 LBS | HEIGHT: 62 IN | SYSTOLIC BLOOD PRESSURE: 116 MMHG | TEMPERATURE: 97 F | HEART RATE: 68 BPM | OXYGEN SATURATION: 98 % | BODY MASS INDEX: 29.22 KG/M2

## 2020-11-10 PROCEDURE — 1123F ACP DISCUSS/DSCN MKR DOCD: CPT | Performed by: INTERNAL MEDICINE

## 2020-11-10 PROCEDURE — G0438 PPPS, INITIAL VISIT: HCPCS | Performed by: INTERNAL MEDICINE

## 2020-11-10 PROCEDURE — 4040F PNEUMOC VAC/ADMIN/RCVD: CPT | Performed by: INTERNAL MEDICINE

## 2020-11-10 PROCEDURE — G8482 FLU IMMUNIZE ORDER/ADMIN: HCPCS | Performed by: INTERNAL MEDICINE

## 2020-11-10 RX ORDER — ATORVASTATIN CALCIUM 20 MG/1
20 TABLET, FILM COATED ORAL DAILY
Qty: 90 TABLET | Refills: 1 | Status: SHIPPED | OUTPATIENT
Start: 2020-11-10 | End: 2021-05-17

## 2020-11-10 RX ORDER — AMOXICILLIN 500 MG/1
CAPSULE ORAL
Qty: 4 CAPSULE | Refills: 0 | Status: SHIPPED | OUTPATIENT
Start: 2020-11-10 | End: 2021-06-25

## 2020-11-10 ASSESSMENT — ENCOUNTER SYMPTOMS
VOMITING: 0
TROUBLE SWALLOWING: 0
COUGH: 0
ROS SKIN COMMENTS: NO CONCERNING SKIN LESIONS
BLOOD IN STOOL: 0
NAUSEA: 0
DIARRHEA: 0
SHORTNESS OF BREATH: 0
CONSTIPATION: 0
BACK PAIN: 0
ABDOMINAL PAIN: 0

## 2020-11-10 ASSESSMENT — PATIENT HEALTH QUESTIONNAIRE - PHQ9
SUM OF ALL RESPONSES TO PHQ9 QUESTIONS 1 & 2: 0
1. LITTLE INTEREST OR PLEASURE IN DOING THINGS: 0
SUM OF ALL RESPONSES TO PHQ QUESTIONS 1-9: 0
SUM OF ALL RESPONSES TO PHQ QUESTIONS 1-9: 0
2. FEELING DOWN, DEPRESSED OR HOPELESS: 0
SUM OF ALL RESPONSES TO PHQ QUESTIONS 1-9: 0

## 2020-11-10 ASSESSMENT — LIFESTYLE VARIABLES: HOW OFTEN DO YOU HAVE A DRINK CONTAINING ALCOHOL: 0

## 2020-11-10 NOTE — PROGRESS NOTES
SUBJECTIVE:  Rl Lares is a 78 y.o. female being evaluated for:    Chief Complaint   Patient presents with    Medicare AWV     review labs-request rx for abx to take prior to dental procedure       HPI   Here for medicare wellness and scheduled for routine dental cleaning  No chest pain sob palpations  Still hears heart beat in ear but otherwise all is great    Patient wants to get off some of her meds    Hx of stroke  Bleed in head in 2004  Told totally blocked up   Heart attack at age 16  Valvular heart disease     Allergies   Allergen Reactions    Lisinopril      cough     Current Outpatient Medications   Medication Sig Dispense Refill    amoxicillin (AMOXIL) 500 MG capsule 4 pills prior to dental procedure 4 capsule 0    atorvastatin (LIPITOR) 20 MG tablet Take 1 tablet by mouth daily 90 tablet 1    metoprolol tartrate (LOPRESSOR) 25 MG tablet TAKE 1 TABLET BY MOUTH TWICE DAILY 180 tablet 1    amiodarone (CORDARONE) 200 MG tablet TAKE 1/2 TABLET BY MOUTH DAILY 45 tablet 3    apixaban (ELIQUIS) 5 MG TABS tablet TAKE ONE TABLET BY MOUTH TWICE A DAY 60 tablet 5    furosemide (LASIX) 40 MG tablet TAKE 1 TABLET BY MOUTH DAILY. TAKE 1 EXTRA TABLET AS NEEDED IF WEIGHT GOES UP 2  tablet 1    potassium chloride (KLOR-CON M) 20 MEQ extended release tablet TAKE ONE TABLET BY MOUTH ONCE DAILY 90 tablet 1    Omega-3 Fatty Acids (FISH OIL) 1000 MG CAPS Take 3,000 mg by mouth daily      Coenzyme Q10 (COQ10) 100 MG CAPS Take 1 tablet by mouth daily       calcium-vitamin D (OSCAL) 250-125 MG-UNIT per tablet Take 1 tablet by mouth 2 times daily.  multivitamin (THERAGRAN) per tablet Take 1 tablet by mouth daily. No current facility-administered medications for this visit. Social History     Socioeconomic History    Marital status:       Spouse name: Not on file    Number of children: Not on file    Years of education: Not on file    Highest education level: Not on file Occupational History    Not on file   Social Needs    Financial resource strain: Not on file    Food insecurity     Worry: Not on file     Inability: Not on file    Transportation needs     Medical: Not on file     Non-medical: Not on file   Tobacco Use    Smoking status: Never Smoker    Smokeless tobacco: Never Used   Substance and Sexual Activity    Alcohol use: Not Currently    Drug use: No    Sexual activity: Not Currently     Partners: Male   Lifestyle    Physical activity     Days per week: Not on file     Minutes per session: Not on file    Stress: Not on file   Relationships    Social connections     Talks on phone: Not on file     Gets together: Not on file     Attends [employer] service: Not on file     Active member of club or organization: Not on file     Attends meetings of clubs or organizations: Not on file     Relationship status: Not on file    Intimate partner violence     Fear of current or ex partner: Not on file     Emotionally abused: Not on file     Physically abused: Not on file     Forced sexual activity: Not on file   Other Topics Concern    Not on file   Social History Narrative    Not on file      Past Medical History:   Diagnosis Date    [facility]     Bleeding ulcer 01/2019    Fibromyalgia     History of blood transfusion 01/2019    2 units    Hyperlipidemia     Hypertension     Osteoporosis     RLS (restless legs syndrome)     Shingles     Stroke ([facility] 2004    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      Past Surgical History:   Procedure Laterality Date    BREAST SURGERY Left     lumpectomy    CARDIOVERSION      Rashida Mondragon     COLONOSCOPY N/A 6/18/2019    COLONOSCOPY POLYPECTOMY COLD BIOPSY performed by Howard Little MD at [address]  9/07    scalp cyst excision    CYST REMOVAL      from left breast    HYSTERECTOMY, TOTAL ABDOMINAL      with bso     OVARIAN CYST REMOVAL Right     Age 25    UPPER GASTROINTESTINAL ENDOSCOPY N/A 1/16/2019    EGD ESOPHAGOGASTRODUODENOSCOPY performed by Diane Bang MD at [facility] N/A 6/18/2019    EGD BIOPSY performed by Diane Bang MD at [address] [place] [address] History   Problem Relation Age of Onset    Heart Disease Mother         CAD    Diabetes Mother     Diabetes Maternal Grandmother     High Blood Pressure Maternal Grandfather     No Known Problems Paternal Grandmother     No Known Problems Paternal Grandfather     Diabetes Maternal Aunt        Review of Systems   Constitutional: Positive for unexpected weight change (slight ). Negative for activity change and fatigue. HENT: Negative for congestion, nosebleeds and trouble swallowing. Eyes: Positive for visual disturbance (loss of vision right lat from CVA). Respiratory: Negative for cough and shortness of breath. Cardiovascular: Negative for chest pain, palpitations and leg swelling. Hears heart beating in ear every night      Gastrointestinal: Negative for abdominal pain, blood in stool, constipation, diarrhea, nausea and vomiting. Endocrine:        Self breast exams negative    Genitourinary: Negative for dysuria and vaginal bleeding. Musculoskeletal: Negative for arthralgias and back pain. Skin:        No concerning skin lesions    Neurological: Negative for dizziness, light-headedness and headaches. Psychiatric/Behavioral: Positive for sleep disturbance. Negative for dysphoric mood. OBJECTIVE:  /68   Pulse 68   Temp 97 °F (36.1 °C) (Temporal)   Ht 5' 2\" (1.575 m)   Wt 158 lb 12.8 oz (72 kg)   LMP  (LMP Unknown)   SpO2 98%   Breastfeeding No   BMI 29.04 kg/m²      Body mass index is 29.04 kg/m². Physical Exam  Constitutional:       General: She is not in acute distress. Appearance: Normal appearance. She is well-developed. HENT:      Head: Normocephalic and atraumatic.       Right Ear: Tympanic membrane and ear MG tablet; Take 1 tablet by mouth daily    Prediabetes long-range sugar 6.0. To follow low carbohydrate weight reducing diet continue exercise    Hx of rheumatic fever  -     amoxicillin (AMOXIL) 500 MG capsule; 4 pills prior to dental procedure    Need for shingles vaccine  -     zoster recombinant adjuvanted vaccine [facility]) 50 MCG/0.5ML SUSR injection; Inject 0.5 mLs into the muscle once for 1 dose        Orders Placed This Encounter   Medications    zoster recombinant adjuvanted vaccine (SHINGRIX) 50 MCG/0.5ML SUSR injection     Sig: Inject 0.5 mLs into the muscle once for 1 dose     Dispense:  0.5 mL     Refill:  1    amoxicillin (AMOXIL) 500 MG capsule     Si pills prior to dental procedure     Dispense:  4 capsule     Refill:  0    atorvastatin (LIPITOR) 20 MG tablet     Sig: Take 1 tablet by mouth daily     Dispense:  90 tablet     Refill:  1        Return in 6 months (on 5/10/2021), or if symptoms worsen or fail to improve, for medical follow up . Patient Instructions     Personalized Preventive Plan for Mina Glaser - 11/10/2020  Medicare offers a range of preventive health benefits. Some of the tests and screenings are paid in full while other may be subject to a deductible, co-insurance, and/or copay. Some of these benefits include a comprehensive review of your medical history including lifestyle, illnesses that may run in your family, and various assessments and screenings as appropriate. After reviewing your medical record and screening and assessments performed today your provider may have ordered immunizations, labs, imaging, and/or referrals for you. A list of these orders (if applicable) as well as your Preventive Care list are included within your After Visit Summary for your review.     Other Preventive Recommendations:    · A preventive eye exam performed by an eye specialist is recommended every 1-2 years to screen for glaucoma; cataracts, macular degeneration, and other eye disorders. · A preventive dental visit is recommended every 6 months. · Try to get at least 150 minutes of exercise per week or 10,000 steps per day on a pedometer . · Order or download the FREE \"Exercise & Physical Activity: Your Everyday Guide\" from [employer]. Call 3-256.375.9302 or search [employer] online. · You need 1056-5228 mg of calcium and 0746-7662 IU of vitamin D per day. It is possible to meet your calcium requirement with diet alone, but a vitamin D supplement is usually necessary to meet this goal.  · When exposed to the sun, use a sunscreen that protects against both UVA and UVB radiation with an SPF of 30 or greater. Reapply every 2 to 3 hours or after sweating, drying off with a towel, or swimming. · Always wear a seat belt when traveling in a car. Always wear a helmet when riding a bicycle or motorcycle.

## 2020-11-17 RX ORDER — AMIODARONE HYDROCHLORIDE 200 MG/1
TABLET ORAL
Qty: 45 TABLET | Refills: 4 | Status: SHIPPED | OUTPATIENT
Start: 2020-11-17 | End: 2021-11-15

## 2020-12-01 ENCOUNTER — OFFICE VISIT (OUTPATIENT)
Dept: CARDIOLOGY CLINIC | Age: 80
End: 2020-12-01
Payer: MEDICARE

## 2020-12-01 VITALS
BODY MASS INDEX: 29.26 KG/M2 | DIASTOLIC BLOOD PRESSURE: 66 MMHG | OXYGEN SATURATION: 98 % | TEMPERATURE: 98.1 F | HEART RATE: 66 BPM | WEIGHT: 159 LBS | HEIGHT: 62 IN | SYSTOLIC BLOOD PRESSURE: 120 MMHG

## 2020-12-01 PROCEDURE — G8417 CALC BMI ABV UP PARAM F/U: HCPCS | Performed by: INTERNAL MEDICINE

## 2020-12-01 PROCEDURE — 93000 ELECTROCARDIOGRAM COMPLETE: CPT | Performed by: INTERNAL MEDICINE

## 2020-12-01 PROCEDURE — 4040F PNEUMOC VAC/ADMIN/RCVD: CPT | Performed by: INTERNAL MEDICINE

## 2020-12-01 PROCEDURE — G8482 FLU IMMUNIZE ORDER/ADMIN: HCPCS | Performed by: INTERNAL MEDICINE

## 2020-12-01 PROCEDURE — 99214 OFFICE O/P EST MOD 30 MIN: CPT | Performed by: INTERNAL MEDICINE

## 2020-12-01 PROCEDURE — G8427 DOCREV CUR MEDS BY ELIG CLIN: HCPCS | Performed by: INTERNAL MEDICINE

## 2020-12-01 PROCEDURE — G8399 PT W/DXA RESULTS DOCUMENT: HCPCS | Performed by: INTERNAL MEDICINE

## 2020-12-01 PROCEDURE — 1036F TOBACCO NON-USER: CPT | Performed by: INTERNAL MEDICINE

## 2020-12-01 PROCEDURE — 1090F PRES/ABSN URINE INCON ASSESS: CPT | Performed by: INTERNAL MEDICINE

## 2020-12-01 PROCEDURE — 1123F ACP DISCUSS/DSCN MKR DOCD: CPT | Performed by: INTERNAL MEDICINE

## 2020-12-01 NOTE — PROGRESS NOTES
[address]        Cardiology Progress Note     Sam Aye  1940 December 1, 2020    CC: \"I feel good. \"     HPI:  The patient is [de-identified] y.o. female with a past medical history significant for HLD, HTN, borderline diabetic, and a past stroke. Today, she is here for follow up for her atrial fibrillation. She states that she is \"feeling good. \" She continues to heart her heartbeat in her left ear that is chronic and has discussed with Dr. Cummings Chol as well, she is going to ENT for evaluation. The patient admits to medical therapy compliance and feels she is tolerating. Patient denies exertional chest pain/pressure, dyspnea at rest, SANTANA, PND, orthopnea, palpitations, lightheadedness, weight changes, changes in LE edema, and syncope. She denies any abnormal bruising or bleeding on anticoagulation therapy.      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      Past Surgical History:   Procedure Laterality Date    BREAST SURGERY Left     lumpectomy    CARDIOVERSION      Rashida Alanna     COLONOSCOPY N/A 6/18/2019    COLONOSCOPY POLYPECTOMY COLD BIOPSY performed by Lamberto Reeves MD at [address]  9/07    scalp cyst excision    CYST REMOVAL      from left breast    HYSTERECTOMY, TOTAL ABDOMINAL      with bso     OVARIAN CYST REMOVAL Right     Age 24    UPPER GASTROINTESTINAL ENDOSCOPY N/A 1/16/2019    EGD ESOPHAGOGASTRODUODENOSCOPY performed by Lamberto Reeves MD at [address] [facility] N/A 6/18/2019    EGD BIOPSY performed by Lamberto Reeves MD at [address] [facility] History   Problem Relation Age of Onset    Heart Disease Mother CAD    Diabetes Mother     Diabetes Maternal Grandmother     High Blood Pressure Maternal Grandfather     No Known Problems Paternal Grandmother     No Known Problems Paternal Grandfather     Diabetes Maternal Aunt      Social History     Tobacco Use    Smoking status: Never Smoker    Smokeless tobacco: Never Used   Substance Use Topics    Alcohol use: Not Currently    Drug use: No       Allergies   Allergen Reactions    Lisinopril      cough     Current Outpatient Medications   Medication Sig Dispense Refill    amiodarone (CORDARONE) 200 MG tablet TAKE 1/2 TABLET BY MOUTH DAILY 45 tablet 4    amoxicillin (AMOXIL) 500 MG capsule 4 pills prior to dental procedure 4 capsule 0    atorvastatin (LIPITOR) 20 MG tablet Take 1 tablet by mouth daily 90 tablet 1    metoprolol tartrate (LOPRESSOR) 25 MG tablet TAKE 1 TABLET BY MOUTH TWICE DAILY 180 tablet 1    apixaban (ELIQUIS) 5 MG TABS tablet TAKE ONE TABLET BY MOUTH TWICE A DAY 60 tablet 5    furosemide (LASIX) 40 MG tablet TAKE 1 TABLET BY MOUTH DAILY. TAKE 1 EXTRA TABLET AS NEEDED IF WEIGHT GOES UP 2  tablet 1    potassium chloride (KLOR-CON M) 20 MEQ extended release tablet TAKE ONE TABLET BY MOUTH ONCE DAILY 90 tablet 1    Omega-3 Fatty Acids (FISH OIL) 1000 MG CAPS Take 3,000 mg by mouth daily      Coenzyme Q10 (COQ10) 100 MG CAPS Take 1 tablet by mouth daily       calcium-vitamin D (OSCAL) 250-125 MG-UNIT per tablet Take 1 tablet by mouth 2 times daily.  multivitamin (THERAGRAN) per tablet Take 1 tablet by mouth daily. No current facility-administered medications for this visit. Review of Systems:  Review of systems is as detailed above and otherwise negative to a 12 point review.     Physical Exam:   /66   Pulse 76   Temp 98.1 °F (36.7 °C)   Ht 5' 2\" (1.575 m)   Wt 159 lb (72.1 kg) Comment: without  shoes  LMP  (LMP Unknown)   SpO2 98%   BMI 29.08 kg/m²   Wt Readings from Last 3 Encounters:   12/01/20 159 Eliquis 5 mg BID, amiodarone and Lopressor. No abnormal bruising or bleeding reported. No s/s of TIA/CVA. 10/31/20 TSH wnl. She did not complete PFT's due to COVID-19 pandemic, she would like to wait. I have recommended her to get PFTs done before her next visit    Hypertension, essential   Blood pressure is stable today. BMP 10/31/20 remains abnormal but completely stable. Hyperlipidemia, unspecified   Last lipid profile from 10/2020 was within acceptable limits. LDL goal < 100. Continue Lipitor 20 mg daily. Follow up in 6 months. Instructed to obtain flu vaccine this season and annually. Thank you very much for allowing me to participate in the care of your patient. Please do not hesitate to contact me if you have any questions. Complexity of medical decision making-HIGH  Sincerely,  Zack Cao MD      [address] [place] [address]  Ph: [phone]  Fax: [phone]    This note was scribed in the presence of Dr. Iesha Smith MD by Farnaz Nickerson RN.

## 2020-12-01 NOTE — PATIENT INSTRUCTIONS
Patient Education        Lung Function Tests: About These Tests  What are these tests? Lung function tests measure how much air your lungs hold and how quickly your lungs can move the air in and out. Spirometry is often the first lung function test that is done. You may also have other tests, such as gas diffusion tests, body plethysmography, inhalation challenge tests, and exercise stress tests. Your doctor will explain which tests you need. These tests check how well your lungs work. They may also be called pulmonary function tests, or PFTs. Why are these tests done? Doctors use lung function tests to find the cause of breathing problems and diagnose lung diseases like asthma or emphysema. You may have lung function tests before you have surgery. Or your doctor may use lung function tests to find out how well treatment for a lung problem is working. How do you prepare for these tests? · Let your doctor know if you take medicines for a lung problem. You may need to stop some of them before the tests. · Do not eat a heavy meal just before this test. A full stomach may keep your lungs from fully expanding. · Don't smoke or do intense exercise for 6 hours before the test.  · For the test, wear loose clothing that doesn't restrict your breathing in any way. · Avoid food or drinks with caffeine. Caffeine can cause your airways to relax and allow more air than usual to pass through. · If you have dentures, wear them during the test. They help you form a tight seal around the mouthpiece of the machine. How are these tests done? What happens during the test depends on the type of test you have. For most tests, you will wear a nose clip. This is to make sure that no air passes in or out of your nose during the test. You then breathe into a mouthpiece attached to a recording device. · For some tests, you breathe in and out as deeply and as fast as you can.   · You may repeat some tests after you inhale a medicine that expands your airways. · You may breathe certain gases, such as 100% oxygen or a mixture of helium and air. · For body plethysmography, you sit inside a small ashraf with windows. The ashraf measures pressure changes that occur as you breathe. The therapist may urge you to breathe deeply during some of the tests to get the best results. You may have a blood test to check the oxygen and carbon dioxide levels in your blood. How long do these tests take? The testing may take from 5 to 30 minutes, depending on how many tests you have. What happens after these tests? · You will probably be able to go home right after the tests. · You can go back to your normal activities right away. Follow-up care is a key part of your treatment and safety. Be sure to make and go to all appointments, and call your doctor if you are having problems. It's also a good idea to know your test results and keep a list of the medicines you take. Where can you learn more? Go to https://SemiNex.SKINNYprice. org and sign in to your [employer] account. Enter Y077 in the [employer] box to learn more about \"Lung Function Tests: About These Tests. \"     If you do not have an account, please click on the \"Sign Up Now\" link. Current as of: February 24, 2020               Content Version: 12.6  © 2006-2020 [employer]. Care instructions adapted under license by [facility] ([facility]). If you have questions about a medical condition or this instruction, always ask your healthcare professional. [place] [address] any warranty or liability for your use of this information. Patient Education        Atrial Fibrillation: Care Instructions  Your Care Instructions     Atrial fibrillation is an irregular and often fast heartbeat. Treating this condition is important for several reasons. It can cause blood clots, which can travel from your heart to your brain and cause a stroke.  If you have a fast heartbeat, you may feel lightheaded, dizzy, and weak. An irregular heartbeat can also increase your risk for heart failure. Atrial fibrillation is often the result of another heart condition, such as high blood pressure or coronary artery disease. Making changes to improve your heart condition will help you stay healthy and active. Follow-up care is a key part of your treatment and safety. Be sure to make and go to all appointments, and call your doctor if you are having problems. It's also a good idea to know your test results and keep a list of the medicines you take. How can you care for yourself at home? Medicines    · Take your medicines exactly as prescribed. Call your doctor if you think you are having a problem with your medicine. You will get more details on the specific medicines your doctor prescribes.     · If your doctor has given you a blood thinner to prevent a stroke, be sure you get instructions about how to take your medicine safely. Blood thinners can cause serious bleeding problems.     · Do not take any vitamins, over-the-counter drugs, or herbal products without talking to your doctor first.   Lifestyle changes    · Do not smoke. Smoking can increase your chance of a stroke and heart attack. If you need help quitting, talk to your doctor about stop-smoking programs and medicines. These can increase your chances of quitting for good.     · Eat a heart-healthy diet.     · Stay at a healthy weight. Lose weight if you need to.     · Limit alcohol to 2 drinks a day for men and 1 drink a day for women. Too much alcohol can cause health problems.     · Avoid colds and flu. Get a pneumococcal vaccine shot. If you have had one before, ask your doctor whether you need another dose. Get a flu shot every year. If you must be around people with colds or flu, wash your hands often. Activity    · If your doctor recommends it, get more exercise. Walking is a good choice.  Bit by bit, increase the amount you walk every day. Try for at least 30 minutes on most days of the week. You also may want to swim, bike, or do other activities. Your doctor may suggest that you join a cardiac rehabilitation program so that you can have help increasing your physical activity safely.     · Start light exercise if your doctor says it is okay. Even a small amount will help you get stronger, have more energy, and manage stress. Walking is an easy way to get exercise. Start out by walking a little more than you did in the hospital. Gradually increase the amount you walk.     · When you exercise, watch for signs that your heart is working too hard. You are pushing too hard if you cannot talk while you are exercising. If you become short of breath or dizzy or have chest pain, sit down and rest immediately.     · Check your pulse regularly. Place two fingers on the artery at the palm side of your wrist, in line with your thumb. If your heartbeat seems uneven or fast, talk to your doctor. When should you call for help? Call 911 anytime you think you may need emergency care. For example, call if:    · You have symptoms of a heart attack. These may include:  ? Chest pain or pressure, or a strange feeling in the chest.  ? Sweating. ? Shortness of breath. ? Nausea or vomiting. ? Pain, pressure, or a strange feeling in the back, neck, jaw, or upper belly or in one or both shoulders or arms. ? Lightheadedness or sudden weakness. ? A fast or irregular heartbeat. After you call 911, the  may tell you to chew 1 adult-strength or 2 to 4 low-dose aspirin. Wait for an ambulance. Do not try to drive yourself.     · You have symptoms of a stroke. These may include:  ? Sudden numbness, tingling, weakness, or loss of movement in your face, arm, or leg, especially on only one side of your body. ? Sudden vision changes. ? Sudden trouble speaking. ? Sudden confusion or trouble understanding simple statements.   ? Sudden problems with walking or balance. ? A sudden, severe headache that is different from past headaches.     · You passed out (lost consciousness). Call your doctor now or seek immediate medical care if:    · You have new or increased shortness of breath.     · You feel dizzy or lightheaded, or you feel like you may faint.     · Your heart rate becomes irregular.     · You can feel your heart flutter in your chest or skip heartbeats. Tell your doctor if these symptoms are new or worse. Watch closely for changes in your health, and be sure to contact your doctor if you have any problems. Where can you learn more? Go to https://Gekko Global MarketspeElcelyx Therapeuticseb.Pelago. org and sign in to your [employer] account. Enter U020 in the Plynked box to learn more about \"Atrial Fibrillation: Care Instructions. \"     If you do not have an account, please click on the \"Sign Up Now\" link. Current as of: December 16, 2019               Content Version: 12.6  © 9489-7587 [employer]. Care instructions adapted under license by [facility] ([facility]). If you have questions about a medical condition or this instruction, always ask your healthcare professional. [place] [address] any warranty or liability for your use of this information. Patient Education        sodium chloride (flush)  Pronunciation:  ANKUSH veena um KLOR bassam  Brand:  BD PosiFlush SF, Normal Saline Flush, Swabflush, Syrex, Thermoject, Tip-Nacho Diluent  What is the most important information I should know about sodium chloride flush? Follow all directions on your medicine label and package. Tell each of your healthcare providers about all your medical conditions, allergies, and all medicines you use. What is sodium chloride flush? Sodium chloride is the chemical name for salt. Sodium chloride can reduce some types of bacteria.   Sodium chloride flush is used to clean out an intravenous (IV) catheter, which helps prevent blockage and removes any medicine left in the catheter area after you have received an IV infusion. Sodium chloride may also be used for purposes not listed in this medication guide. What should I discuss with my health care provider before using sodium chloride flush? Before using sodium chloride flush, tell your doctor if you are allergic to any drugs. How should I use sodium chloride flush? Use exactly as prescribed by your doctor. Do not use in larger or smaller amounts or more often than recommended. You will use sodium chloride flush each time you use your IV catheter. Follow your doctor's instructions about how many times per day to use the flush. Before using the flush, check the solution container to make sure there are no leaks in it. Do not use the medication if it has changed colors or has particles in it. Call your doctor for a new prescription. Call your doctor or tell your caregivers if your catheter, needle, or IV tubing becomes blocked or if the flush or IV medicine is not flowing normally. Throw away used needles in a puncture-proof \"sharps\" disposal container (ask your pharmacist where to get one and how to throw it away). Keep this container out of the reach of children and pets. Store sodium chloride at room temperature away from moisture and heat. Keep all of your catheter flush supplies in a clean dry place when not in use. What happens if I miss a dose? If you use flush your catheter each time you use it, you are not likely to miss a dose. What happens if I overdose? An overdose of sodium chloride flush is unlikely to occur. What should I avoid while using sodium chloride flush? Follow your doctor's instructions about any restrictions on food, beverages, or activity. What are the possible side effects of sodium chloride flush? Get emergency medical help if you have signs of an allergic reaction:  hives; difficult breathing; swelling of your face, lips, tongue, or throat.   Stop using sodium chloride expertise, skill, knowledge and judgment of healthcare practitioners. The absence of a warning for a given drug or drug combination in no way should be construed to indicate that the drug or drug combination is safe, effective or appropriate for any given patient. [facility] does not assume any responsibility for any aspect of healthcare administered with the aid of information [facility] provides. The information contained herein is not intended to cover all possible uses, directions, precautions, warnings, drug interactions, allergic reactions, or adverse effects. If you have questions about the drugs you are taking, check with your doctor, nurse or pharmacist.  Copyright 0111-5461 [facility]"[employer] [address]. Version: 1.03. Revision date: 4/30/2016. Care instructions adapted under license by [address] If you have questions about a medical condition or this instruction, always ask your healthcare professional. [place] [address] any warranty or liability for your use of this information. Patient Education        Walking for Exercise: Care Instructions  Your Care Instructions     Walking is one of the easiest ways to get the exercise you need for good health. A brisk, 30-minute walk each day can help you feel better and have more energy. It can help you lower your risk of disease. Walking can help you keep your bones strong and your heart healthy. Check with your doctor before you start a walking plan if you have heart problems, other health issues, or you have not been active in a long time. Follow your doctor's instructions for safe levels of exercise. Follow-up care is a key part of your treatment and safety. Be sure to make and go to all appointments, and call your doctor if you are having problems. It's also a good idea to know your test results and keep a list of the medicines you take. How can you care for yourself at home?   Getting started  · Start slowly and set a short-term goal. For example, walk for 5 or 10 minutes every day. · Bit by bit, increase the amount you walk every day. Try for at least 30 minutes on most days of the week. You also may want to swim, bike, or do other activities. · If finding enough time is a problem, it is fine to be active in blocks of 10 minutes or more throughout your day and week. · To get the heart-healthy benefits of walking, you need to walk briskly enough to increase your heart rate and breathing, but not so fast that you cannot talk comfortably. · Wear comfortable shoes that fit well and provide good support for your feet and ankles. Staying with your plan  · After you've made walking a habit, set a longer-term goal. You may want to set a goal of walking briskly for longer or walking farther. Experts say to do 2½ hours of moderate activity a week. A faster heartbeat is what defines moderate-level activity. · To stay motivated, walk with friends, coworkers, or pets. · Use a phone carla or pedometer to track your steps each day. Set a goal to increase your steps. Once you get there, set a higher goal. Aim for 10,000 steps a day. · If the weather keeps you from walking outside, go for walks at the mall with a friend. Local schools and churches may have indoor gyms where you can walk. Fitting a walk into your workday  · Park several blocks away from work, or get off the bus a few stops early. · Use the stairs instead of the elevator, at least for a few floors. · Suggest holding meetings with colleagues during a walk inside or outside the building. · Use the restroom that is the farthest from your desk or workstation. · Use your morning and afternoon breaks to take quick 15-minute walks. Staying safe  · Know your surroundings. Walk in a well-lighted, safe place. If it is dark, walk with a partner. Wear light-colored clothing. If you can, buy a vest or jacket that reflects light. · Carry a cell phone for emergencies. · Drink plenty of water.  Take a water bottle with you when you walk. This is very important if it is hot out. · Be careful not to slip on wet or icy ground. You can buy \"grippers\" for your shoes to help keep you from slipping. · Pay attention to your walking surface. Use sidewalks and paths. · If you have breathing problems like asthma or COPD, ask your doctor when it is safe for you to walk outdoors. Cold, dry air, smog, pollen, or other things in the air could cause breathing problems. Where can you learn more? Go to https://SqeeqeepeHiringThing.Go Long Wireless. org and sign in to your [employer] account. Enter R159 in the Muufri box to learn more about \"Walking for Exercise: Care Instructions. \"     If you do not have an account, please click on the \"Sign Up Now\" link. Current as of: January 16, 2020               Content Version: 12.6  © 5564-3864 [employer], [employer]. Care instructions adapted under license by [facility] ([facility]). If you have questions about a medical condition or this instruction, always ask your healthcare professional. [place] [address] any warranty or liability for your use of this information.

## 2020-12-22 RX ORDER — POTASSIUM CHLORIDE 20 MEQ/1
TABLET, EXTENDED RELEASE ORAL
Qty: 90 TABLET | Refills: 4 | Status: SHIPPED | OUTPATIENT
Start: 2020-12-22 | End: 2021-12-20

## 2021-01-01 NOTE — DISCHARGE INSTR - DIET

## 2021-05-17 DIAGNOSIS — E78.2 MIXED HYPERLIPIDEMIA: ICD-10-CM

## 2021-05-17 DIAGNOSIS — I63.30 CEREBROVASCULAR ACCIDENT (CVA) DUE TO THROMBOSIS OF CEREBRAL ARTERY (HCC): ICD-10-CM

## 2021-05-17 RX ORDER — ATORVASTATIN CALCIUM 20 MG/1
20 TABLET, FILM COATED ORAL DAILY
Qty: 90 TABLET | Refills: 1 | Status: SHIPPED | OUTPATIENT
Start: 2021-05-17 | End: 2021-11-15

## 2021-06-21 NOTE — PROGRESS NOTES
[facility]        Cardiology Progress Note     Jeimy Steen  1940 June 25, 2021    CC: \"I have no heart symptoms. \"     HPI:  The patient is [de-identified] y.o. female with a past medical history significant for AFIB, SR with LBBB, HLD, HTN, borderline diabetic, and a past stroke. Today, she is here for follow up and denies any cardiac sounding symptoms. Patient denies exertional chest pain/pressure, dyspnea at rest, SANTANA, PND, orthopnea, palpitations, heart racing, lightheadedness, dizziness, weight changes, changes in LE edema, and syncope. She is wearing compression stockings to work daily. She is staying hydrated and eating whole foods. She admits to medical therapy compliance. She denies abnormal bruising or bleeding on chronic anticoagulation therapy.      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 N/A 6/18/2019    EGD BIOPSY performed by Mik Young MD at [facility] History   Problem Relation Age of Onset    Heart Disease Mother         CAD    Diabetes Mother     Diabetes Maternal Grandmother     High Blood Pressure Maternal Grandfather     No Known Problems Paternal Grandmother     No Known Problems Paternal Grandfather     Diabetes Maternal Aunt      Social History     Tobacco Use    Smoking status: Never Smoker    Smokeless tobacco: Never Used   Vaping Use    Vaping Use: Never used   Substance Use Topics    Alcohol use: Not Currently    Drug use: No       Allergies   Allergen Reactions    Lisinopril      cough     Current Outpatient Medications   Medication Sig Dispense Refill    atorvastatin (LIPITOR) 20 MG tablet TAKE 1 TABLET BY MOUTH DAILY 90 tablet 1    metoprolol tartrate (LOPRESSOR) 25 MG tablet TAKE 1 TABLET BY MOUTH TWICE DAILY 180 tablet 1    apixaban (ELIQUIS) 5 MG TABS tablet TAKE 1 TABLET BY MOUTH TWICE DAILY 60 tablet 5    potassium chloride (KLOR-CON M) 20 MEQ extended release tablet TAKE 1 TABLET BY MOUTH EVERY DAY 90 tablet 4    amiodarone (CORDARONE) 200 MG tablet TAKE 1/2 TABLET BY MOUTH DAILY 45 tablet 4    furosemide (LASIX) 40 MG tablet TAKE 1 TABLET BY MOUTH DAILY. TAKE 1 EXTRA TABLET AS NEEDED IF WEIGHT GOES UP 2  tablet 1    Omega-3 Fatty Acids (FISH OIL) 1000 MG CAPS Take 3,000 mg by mouth daily      Coenzyme Q10 (COQ10) 100 MG CAPS Take 1 tablet by mouth daily       calcium-vitamin D (OSCAL) 250-125 MG-UNIT per tablet Take 1 tablet by mouth 2 times daily.  multivitamin (THERAGRAN) per tablet Take 1 tablet by mouth daily. No current facility-administered medications for this visit. Review of Systems:  · Constitutional: no unanticipated weight loss. There's been no change in energy level, sleep pattern, or activity level. No fevers, chills. · Eyes: No visual changes or diplopia. No scleral icterus. · ENT: No Headaches, hearing loss or vertigo. No mouth sores or sore throat. · Cardiovascular: as reviewed in HPI  · Respiratory: No cough or wheezing, no sputum production. No hematemesis.     · Gastrointestinal: No abdominal pain, appetite loss, blood in stools. No change in bowel or bladder habits. · Genitourinary: No dysuria, trouble voiding, or hematuria. · Musculoskeletal:  No gait disturbance, no joint complaints. · Integumentary: No rash or pruritis. · Neurological: No headache, diplopia, change in muscle strength, numbness or tingling. · Psychiatric: No anxiety or depression. · Endocrine: No temperature intolerance. No excessive thirst, fluid intake, or urination. No tremor. · Hematologic/Lymphatic: No abnormal bruising or bleeding, blood clots or swollen lymph nodes. · Allergic/Immunologic: No nasal congestion or hives. Physical Exam:   /74   Pulse 59   Ht 5' 2\" (1.575 m)   Wt 155 lb (70.3 kg)   LMP  (LMP Unknown)   SpO2 98%   BMI 28.35 kg/m²   Wt Readings from Last 3 Encounters:   06/25/21 155 lb (70.3 kg)   12/01/20 159 lb (72.1 kg)   11/10/20 158 lb 12.8 oz (72 kg)     Constitutional: She is oriented to person, place, and time. She appears well-developed and well-nourished. In no acute distress. Head: Normocephalic and atraumatic. Pupils equal and round. Neck: Neck supple. No JVP or carotid bruit appreciated. No mass and no thyromegaly present. No lymphadenopathy present. Cardiovascular: Regular heart rhythm. Exam reveals no gallop and no friction rub. No murmur heard. Pulmonary/Chest: Effort normal and breath sounds normal. No respiratory distress. She has no wheezes, rhonchi or rales. Abdominal: Soft, non-tender. Bowel sounds are normal. She exhibits no organomegaly, mass or bruit. Extremities: No edema, cyanosis, or clubbing. Pulses are 2+ radial/dorsalis pedis/posterior tibial/carotid bilaterally. Neurological: No gross cranial nerve deficit. Coordination normal.   Skin: Skin is warm and dry. There is no rash or diaphoresis. Psychiatric: She has a normal mood and affect.  Her speech is normal and behavior is normal.     Lab Review:   Lab Results   Component Value Date TRIG 123 10/31/2020    HDL 71 10/31/2020    HDL 42 05/15/2012    LDLCALC 85 10/31/2020    LABVLDL 25 10/31/2020     Lab Results   Component Value Date    BUN 34 10/31/2020    CREATININE 1.0 10/31/2020     EKG Interpretation:   6/30/16 Normal Sinus Rhythm with old ASMI with frequent PAC's and isolated PVC. 11/12/18: Atrial fibrillation rate 69  12/17/19: Sinus rhythm LBBB  12/1/20:  Sinus  Rhythm with Left bundle branch block. 6/22/21: SB with LBBB, ~56 npm.     Image Review:     Holter Monitor 3/2016  NSR  Frequent Supraventricular ectopies with frequent runs of PSVT/PAT    Stress 3/11/2016  Summary   unable to gate due to irreg hr      Normal stress myocardial perfusion. There is normal isotope uptake at stress and rest. There is no evidence of   myocardial ischemia or scar. ECHO 3/11/2016  Left ventricle size is normal.  Normal left ventricular wall thickness. Ejection fraction is lower limit of normal visually estimated to be 50 %. No regional wall motion abnormalities are noted. Normal diastolic function. Mild mitral regurgitation is present. Trace to mild tricuspid regurgitation with an RVSP of 36 mmHg    ECHO 6/17/2013 ([facility])  Summary:  The left ventricular wall motion is normal.  Left ventricular systolic function is normal. (An ejection fraction  >or= to 55% is considered normal)  The right ventricle is borderline dilated. The left atrium is mildly dilated. The right atrium is mildly dilated. There is mild mitral regurgitation. Mild - moderate tricuspid regurgitation is present. Lexiscan Stress 6/18/2013  ([facility])  IMAGING FINDINGS:  Stress Images:   Stress images show minimal decreased radiotracer uptake along the anterior wall of the left ventricle near the base. Rest images:   Rest images show normal perfusion with redistribution of the small area in the anterior wall base. LVEF:  72 percent . (Normal is 50% or greater).   LV wall motion:   Normal  LVEDV:   91 mL (Normal is up to 100ml for women and 150ml for men). Transient dilatation ratio:  1.1.  (Normal is up to 1.3 for women and 1.2 for men). ECHO 10/26/18  Left ventricular cavity size is normal.  Normal left ventricular wall thickness. Difficult to estimate EF due to rate/rhythm/arrhythmia but is lower limit of  normal  Mitral annular calcification is present. Mitral valve leaflets appear mildly thickened. Mild mitral regurgitation. The left atrium is dilated. right ventricular is slightly increased in size. Mild tricuspid regurgitation with RVSP of 37 mmHg. The right atrium is dilated. Assessment/Plan:     Atrial fibrillation   She is S/p DCCV on 9/11/19. Today, she  denies having any symptoms of dizziness,  Palpitation, heart racing or shortness of breath. She denies awareness of her heartbeat in her left ear as previously reported. Her EKG today confirms sinus bradycardia, ~56 BPM. Continue Eliquis 5 mg BID, amiodarone and Lopressor. No abnormal bruising or bleeding reported. No s/s of TIA/CVA. 10/31/20 TSH wnl. She did not complete PFT's due to COVID-19 pandemic. I have recommended her to get PFTs and TSH before her next visit. She did not obtain COVID vaccine but will consider getting it. Hypertension, essential   Blood pressure is stable today. BMP 10/31/20 remains abnormal but completely stable. Hyperlipidemia, unspecified   Last lipid profile from 10/2020 was within acceptable limits. LDL goal < 100. Continue Lipitor 20 mg daily. Will repeat lipid/liver profile prior to next visit. BLE edema  Lasix and daily compression stockings. Stable. Follow up in 6-7 months. Complexity of medical decision making-high    Thank you very much for allowing me to participate in the care of your patient. Please do not hesitate to contact me if you have any questions.     Sincerely,  Elin Whelan MD      [facility], [address] [place] [address]  Ph: [phone]  Fax: [phone]

## 2021-06-25 ENCOUNTER — OFFICE VISIT (OUTPATIENT)
Dept: CARDIOLOGY CLINIC | Age: 81
End: 2021-06-25
Payer: MEDICARE

## 2021-06-25 ENCOUNTER — HOSPITAL ENCOUNTER (EMERGENCY)
Age: 81
Discharge: HOME | End: 2021-06-25
Attending: EMERGENCY MEDICINE
Payer: MEDICARE

## 2021-06-25 VITALS
HEIGHT: 62 IN | WEIGHT: 155 LBS | DIASTOLIC BLOOD PRESSURE: 74 MMHG | HEART RATE: 59 BPM | BODY MASS INDEX: 28.52 KG/M2 | SYSTOLIC BLOOD PRESSURE: 132 MMHG | OXYGEN SATURATION: 98 %

## 2021-06-25 VITALS
HEART RATE: 57 BPM | RESPIRATION RATE: 17 BRPM | HEIGHT: 62 IN | WEIGHT: 155 LBS | DIASTOLIC BLOOD PRESSURE: 60 MMHG | TEMPERATURE: 97.3 F | SYSTOLIC BLOOD PRESSURE: 134 MMHG | BODY MASS INDEX: 28.52 KG/M2 | OXYGEN SATURATION: 98 %

## 2021-06-25 DIAGNOSIS — E78.5 HYPERLIPIDEMIA, UNSPECIFIED HYPERLIPIDEMIA TYPE: ICD-10-CM

## 2021-06-25 DIAGNOSIS — Z79.899 LONG TERM CURRENT USE OF AMIODARONE: ICD-10-CM

## 2021-06-25 DIAGNOSIS — J02.9 PHARYNGITIS, UNSPECIFIED ETIOLOGY: Primary | ICD-10-CM

## 2021-06-25 DIAGNOSIS — I10 ESSENTIAL HYPERTENSION: ICD-10-CM

## 2021-06-25 DIAGNOSIS — I48.0 PAF (PAROXYSMAL ATRIAL FIBRILLATION) (HCC): Primary | ICD-10-CM

## 2021-06-25 LAB — S PYO AG THROAT QL: NEGATIVE

## 2021-06-25 PROCEDURE — 87880 STREP A ASSAY W/OPTIC: CPT

## 2021-06-25 PROCEDURE — 93000 ELECTROCARDIOGRAM COMPLETE: CPT | Performed by: INTERNAL MEDICINE

## 2021-06-25 PROCEDURE — G8417 CALC BMI ABV UP PARAM F/U: HCPCS | Performed by: INTERNAL MEDICINE

## 2021-06-25 PROCEDURE — 1036F TOBACCO NON-USER: CPT | Performed by: INTERNAL MEDICINE

## 2021-06-25 PROCEDURE — 99214 OFFICE O/P EST MOD 30 MIN: CPT | Performed by: INTERNAL MEDICINE

## 2021-06-25 PROCEDURE — 87081 CULTURE SCREEN ONLY: CPT

## 2021-06-25 PROCEDURE — 1090F PRES/ABSN URINE INCON ASSESS: CPT | Performed by: INTERNAL MEDICINE

## 2021-06-25 PROCEDURE — G8399 PT W/DXA RESULTS DOCUMENT: HCPCS | Performed by: INTERNAL MEDICINE

## 2021-06-25 PROCEDURE — G8427 DOCREV CUR MEDS BY ELIG CLIN: HCPCS | Performed by: INTERNAL MEDICINE

## 2021-06-25 PROCEDURE — 99283 EMERGENCY DEPT VISIT LOW MDM: CPT

## 2021-06-25 PROCEDURE — 1123F ACP DISCUSS/DSCN MKR DOCD: CPT | Performed by: INTERNAL MEDICINE

## 2021-06-25 PROCEDURE — 4040F PNEUMOC VAC/ADMIN/RCVD: CPT | Performed by: INTERNAL MEDICINE

## 2021-06-25 ASSESSMENT — PAIN - FUNCTIONAL ASSESSMENT: PAIN_FUNCTIONAL_ASSESSMENT: ACTIVITIES ARE NOT PREVENTED

## 2021-06-25 ASSESSMENT — PAIN DESCRIPTION - FREQUENCY: FREQUENCY: CONTINUOUS

## 2021-06-25 ASSESSMENT — PAIN DESCRIPTION - PAIN TYPE
TYPE: ACUTE PAIN
TYPE: ACUTE PAIN

## 2021-06-25 ASSESSMENT — PAIN SCALES - GENERAL
PAINLEVEL_OUTOF10: 2
PAINLEVEL_OUTOF10: 2

## 2021-06-25 ASSESSMENT — PAIN DESCRIPTION - PROGRESSION
CLINICAL_PROGRESSION: NOT CHANGED
CLINICAL_PROGRESSION: NOT CHANGED

## 2021-06-25 ASSESSMENT — PAIN DESCRIPTION - LOCATION
LOCATION: THROAT
LOCATION: THROAT

## 2021-06-25 ASSESSMENT — PAIN DESCRIPTION - DESCRIPTORS: DESCRIPTORS: SORE

## 2021-06-25 NOTE — PATIENT INSTRUCTIONS
Patient Education        Atrial Fibrillation: Care Instructions  Your Care Instructions     Atrial fibrillation is an irregular and often fast heartbeat. Treating this condition is important for several reasons. It can cause blood clots, which can travel from your heart to your brain and cause a stroke. If you have a fast heartbeat, you may feel lightheaded, dizzy, and weak. An irregular heartbeat can also increase your risk for heart failure. Atrial fibrillation is often the result of another heart condition, such as high blood pressure or coronary artery disease. Making changes to improve your heart condition will help you stay healthy and active. Follow-up care is a key part of your treatment and safety. Be sure to make and go to all appointments, and call your doctor if you are having problems. It's also a good idea to know your test results and keep a list of the medicines you take. How can you care for yourself at home? Medicines    · Take your medicines exactly as prescribed. Call your doctor if you think you are having a problem with your medicine. You will get more details on the specific medicines your doctor prescribes.     · If your doctor has given you a blood thinner to prevent a stroke, be sure you get instructions about how to take your medicine safely. Blood thinners can cause serious bleeding problems.     · Do not take any vitamins, over-the-counter drugs, or herbal products without talking to your doctor first.   Lifestyle changes    · Do not smoke. Smoking can increase your chance of a stroke and heart attack. If you need help quitting, talk to your doctor about stop-smoking programs and medicines. These can increase your chances of quitting for good.     · Eat a heart-healthy diet.     · Stay at a healthy weight. Lose weight if you need to.     · Limit alcohol to 2 drinks a day for men and 1 drink a day for women. Too much alcohol can cause health problems.     · Avoid colds and flu.  Get a pneumococcal vaccine shot. If you have had one before, ask your doctor whether you need another dose. Get a flu shot every year. If you must be around people with colds or flu, wash your hands often. Activity    · If your doctor recommends it, get more exercise. Walking is a good choice. Bit by bit, increase the amount you walk every day. Try for at least 30 minutes on most days of the week. You also may want to swim, bike, or do other activities. Your doctor may suggest that you join a cardiac rehabilitation program so that you can have help increasing your physical activity safely.     · Start light exercise if your doctor says it is okay. Even a small amount will help you get stronger, have more energy, and manage stress. Walking is an easy way to get exercise. Start out by walking a little more than you did in the hospital. Gradually increase the amount you walk.     · When you exercise, watch for signs that your heart is working too hard. You are pushing too hard if you cannot talk while you are exercising. If you become short of breath or dizzy or have chest pain, sit down and rest immediately.     · Check your pulse regularly. Place two fingers on the artery at the palm side of your wrist, in line with your thumb. If your heartbeat seems uneven or fast, talk to your doctor. When should you call for help? Call 911 anytime you think you may need emergency care. For example, call if:    · You have symptoms of a heart attack. These may include:  ? Chest pain or pressure, or a strange feeling in the chest.  ? Sweating. ? Shortness of breath. ? Nausea or vomiting. ? Pain, pressure, or a strange feeling in the back, neck, jaw, or upper belly or in one or both shoulders or arms. ? Lightheadedness or sudden weakness. ? A fast or irregular heartbeat. After you call 911, the  may tell you to chew 1 adult-strength or 2 to 4 low-dose aspirin. Wait for an ambulance.  Do not try to drive yourself.     · You have symptoms of a stroke. These may include:  ? Sudden numbness, tingling, weakness, or loss of movement in your face, arm, or leg, especially on only one side of your body. ? Sudden vision changes. ? Sudden trouble speaking. ? Sudden confusion or trouble understanding simple statements. ? Sudden problems with walking or balance. ? A sudden, severe headache that is different from past headaches.     · You passed out (lost consciousness). Call your doctor now or seek immediate medical care if:    · You have new or increased shortness of breath.     · You feel dizzy or lightheaded, or you feel like you may faint.     · Your heart rate becomes irregular.     · You can feel your heart flutter in your chest or skip heartbeats. Tell your doctor if these symptoms are new or worse. Watch closely for changes in your health, and be sure to contact your doctor if you have any problems. Where can you learn more? Go to https://Anapsis.Storyful. org and sign in to your Intellicyt account. Enter U020 in the [employer] box to learn more about \"Atrial Fibrillation: Care Instructions. \"     If you do not have an account, please click on the \"Sign Up Now\" link. Current as of: August 31, 2020               Content Version: 12.9  © 2006-2021 Healthwise, [employer]. Care instructions adapted under license by [facility] ([facility]). If you have questions about a medical condition or this instruction, always ask your healthcare professional. [place] [address] any warranty or liability for your use of this information. Patient Education        Lung Function Tests: About These Tests  What are these tests? Lung function tests measure how much air your lungs hold and how quickly your lungs can move the air in and out. Spirometry is often the first lung function test that is done.  You may also have other tests, such as gas diffusion tests, body plethysmography, inhalation challenge test to check the oxygen and carbon dioxide levels in your blood. How long do these tests take? The testing may take from 5 to 30 minutes, depending on how many tests you have. What happens after these tests? · You will probably be able to go home right after the tests. · You can go back to your normal activities right away. Follow-up care is a key part of your treatment and safety. Be sure to make and go to all appointments, and call your doctor if you are having problems. It's also a good idea to know your test results and keep a list of the medicines you take. Where can you learn more? Go to https://Voter GravitypePlastyceweb.RedT. org and sign in to your Zyga account. Enter M313 in the [employer] box to learn more about \"Lung Function Tests: About These Tests. \"     If you do not have an account, please click on the \"Sign Up Now\" link. Current as of: October 26, 2020               Content Version: 12.9  © 2006-2021 Healthwise, Incorporated. Care instructions adapted under license by [facility] ([facility]). If you have questions about a medical condition or this instruction, always ask your healthcare professional. [place] [address] any warranty or liability for your use of this information.

## 2021-06-25 NOTE — ED PROVIDER NOTES
Socioeconomic History    Marital status:      Spouse name: Not on file    Number of children: Not on file    Years of education: Not on file    Highest education level: Not on file   Occupational History    Not on file   Tobacco Use    Smoking status: Never Smoker    Smokeless tobacco: Never Used   Vaping Use    Vaping Use: Never used   Substance and Sexual Activity    Alcohol use: Not Currently    Drug use: No    Sexual activity: Not Currently     Partners: Male   Other Topics Concern    Not on file   Social History Narrative    Not on file     Social Determinants of Health     Financial Resource Strain:     Difficulty of Paying Living Expenses:    Food Insecurity:     Worried About Running Out of Food in the Last Year:     [address] in the Last Year:    Transportation Needs:     Lack of Transportation (Medical):  Lack of Transportation (Non-Medical):    Physical Activity:     Days of Exercise per Week:     Minutes of Exercise per Session:    Stress:     Feeling of Stress :    Social Connections:     Frequency of Communication with Friends and Family:     Frequency of Social Gatherings with Friends and Family:     Attends [employer] Services:     Active Member of Clubs or Organizations:     Attends Club or Organization Meetings:     Marital Status:    Intimate Partner Violence:     Fear of Current or Ex-Partner:     Emotionally Abused:     Physically Abused:     Sexually Abused:      No current facility-administered medications for this encounter.      Current Outpatient Medications   Medication Sig Dispense Refill    atorvastatin (LIPITOR) 20 MG tablet TAKE 1 TABLET BY MOUTH DAILY 90 tablet 1    metoprolol tartrate (LOPRESSOR) 25 MG tablet TAKE 1 TABLET BY MOUTH TWICE DAILY 180 tablet 1    apixaban (ELIQUIS) 5 MG TABS tablet TAKE 1 TABLET BY MOUTH TWICE DAILY 60 tablet 5    potassium chloride (KLOR-CON M) 20 MEQ extended release tablet TAKE 1 TABLET BY MOUTH EVERY DAY 90 tablet 4    amiodarone (CORDARONE) 200 MG tablet TAKE 1/2 TABLET BY MOUTH DAILY 45 tablet 4    furosemide (LASIX) 40 MG tablet TAKE 1 TABLET BY MOUTH DAILY. TAKE 1 EXTRA TABLET AS NEEDED IF WEIGHT GOES UP 2  tablet 1    Omega-3 Fatty Acids (FISH OIL) 1000 MG CAPS Take 3,000 mg by mouth daily      Coenzyme Q10 (COQ10) 100 MG CAPS Take 1 tablet by mouth daily       calcium-vitamin D (OSCAL) 250-125 MG-UNIT per tablet Take 1 tablet by mouth 2 times daily.  multivitamin (THERAGRAN) per tablet Take 1 tablet by mouth daily. Allergies   Allergen Reactions    Lisinopril      cough       [unfilled]    Nursing Notes Reviewed    Physical Exam:  Vitals:    06/25/21 1007   BP: 134/60   Pulse: 57   Resp: 17   Temp: 97.3 °F (36.3 °C)   SpO2: 98%       GENERAL APPEARANCE: Awake and alert. Cooperative. No acute distress. HEAD: Normocephalic. Atraumatic. EYES: EOM's grossly intact. Sclera anicteric. ENT: Mucous membranes are moist. Tolerates saliva. No trismus. Examination of the oropharynx shows no exudate normal symmetry and architecture. There are 2 small bumps in the back of the throat that the patient is identified to me but they are not pathognomonic and they are not abscesses. NECK: Supple. No meningismus. Trachea midline. HEART: RRR. Radial pulses 2+. LUNGS: Respirations unlabored. CTAB  ABDOMEN: Soft. Non-tender. No guarding or rebound. EXTREMITIES: No acute deformities. SKIN: Warm and dry. NEUROLOGICAL: No gross facial drooping. Moves all 4 extremities spontaneously. PSYCHIATRIC: Normal mood.     I have reviewed and interpreted all of the currently available lab results from this visit (if applicable):  Results for orders placed or performed during the hospital encounter of 06/25/21   Strep Screen Group A Throat    Specimen: Throat   Result Value Ref Range    Rapid Strep A Screen Negative Negative        Radiographs (if obtained):  [] The following radiograph was words are mis-transcribed.)    MD Gustavo Lowery MD  06/25/21 3415

## 2021-06-25 NOTE — ED TRIAGE NOTES
Patient states she has had a sore throat for one week. No fever, body aches or malaise. Wants to make sure she doesn't  have strep. No recent known exposures to strep throat. Pleasant, talkative and cheerful disposition.

## 2021-06-25 NOTE — LETTER
[facility] [address]  Phone: [phone]               June 25, 2021    Patient: Krishan Orellana   YOB: 1940   Date of Visit: 6/25/2021       To Whom It May Concern:    Sherman Biggs was seen and treated in our emergency department on 6/25/2021. Please excuse her while she was in the Emergency Dept today. She may return to work today.       Sincerely,       Dr. Petra Santamaria        Signature:__________________________________

## 2021-06-27 LAB — S PYO THROAT QL CULT: NORMAL

## 2021-06-28 ENCOUNTER — CARE COORDINATION (OUTPATIENT)
Dept: CARE COORDINATION | Age: 81
End: 2021-06-28

## 2021-06-28 NOTE — CARE COORDINATION
follow up appointment(s):     Non-face-to-face services provided:  Scheduled appointment with PCP-contacted office and spoke with Augustina to request an appt  Obtained and reviewed discharge summary and/or continuity of care documents  Reviewed and followed up on pending diagnostic tests and treatments-reviewed labs. Provided follow up plan   Education of patient/family/caregiver/guardian to support self-management-encouraged rest and hydration     Advance Care Planning:   Does patient have an Advance Directive:  not on file. Educated patient about risk for severe COVID-19 due to risk factors according to CDC guidelines. ACM reviewed discharge instructions, medical action plan and red flag symptoms with the patient who verbalized understanding. Discussed COVID vaccination status: Yes. Education provided on COVID-19 vaccination as appropriate. Discussed exposure protocols and quarantine with CDC Guidelines. Patient was given an opportunity to verbalize any questions and concerns and agrees to contact ACM or health care provider for questions related to their healthcare. Reviewed and educated patient on any new and changed medications related to discharge diagnosis     Was patient discharged with a pulse oximeter? No  Discussed and confirmed pulse oximeter discharge instructions and when to notify provider or seek emergency care. ACM provided contact information. Plan for follow-up call in 5-7 days based on severity of symptoms and risk factors.

## 2021-06-30 ENCOUNTER — OFFICE VISIT (OUTPATIENT)
Dept: FAMILY MEDICINE CLINIC | Age: 81
End: 2021-06-30
Payer: MEDICARE

## 2021-06-30 ENCOUNTER — TELEPHONE (OUTPATIENT)
Dept: FAMILY MEDICINE CLINIC | Age: 81
End: 2021-06-30

## 2021-06-30 VITALS
HEIGHT: 62 IN | DIASTOLIC BLOOD PRESSURE: 74 MMHG | HEART RATE: 76 BPM | BODY MASS INDEX: 28.41 KG/M2 | TEMPERATURE: 98.1 F | SYSTOLIC BLOOD PRESSURE: 118 MMHG | OXYGEN SATURATION: 97 % | WEIGHT: 154.4 LBS

## 2021-06-30 DIAGNOSIS — I50.32 CHRONIC DIASTOLIC (CONGESTIVE) HEART FAILURE (HCC): ICD-10-CM

## 2021-06-30 DIAGNOSIS — J40 BRONCHITIS: Primary | ICD-10-CM

## 2021-06-30 PROCEDURE — G8427 DOCREV CUR MEDS BY ELIG CLIN: HCPCS | Performed by: INTERNAL MEDICINE

## 2021-06-30 PROCEDURE — G8399 PT W/DXA RESULTS DOCUMENT: HCPCS | Performed by: INTERNAL MEDICINE

## 2021-06-30 PROCEDURE — 1090F PRES/ABSN URINE INCON ASSESS: CPT | Performed by: INTERNAL MEDICINE

## 2021-06-30 PROCEDURE — G8417 CALC BMI ABV UP PARAM F/U: HCPCS | Performed by: INTERNAL MEDICINE

## 2021-06-30 PROCEDURE — 99213 OFFICE O/P EST LOW 20 MIN: CPT | Performed by: INTERNAL MEDICINE

## 2021-06-30 PROCEDURE — 1123F ACP DISCUSS/DSCN MKR DOCD: CPT | Performed by: INTERNAL MEDICINE

## 2021-06-30 PROCEDURE — 4040F PNEUMOC VAC/ADMIN/RCVD: CPT | Performed by: INTERNAL MEDICINE

## 2021-06-30 PROCEDURE — 1036F TOBACCO NON-USER: CPT | Performed by: INTERNAL MEDICINE

## 2021-06-30 RX ORDER — METHYLPREDNISOLONE 4 MG/1
TABLET ORAL
Qty: 1 KIT | Refills: 0 | Status: SHIPPED | OUTPATIENT
Start: 2021-06-30 | End: 2021-07-06

## 2021-06-30 RX ORDER — CEFUROXIME AXETIL 250 MG/1
250 TABLET ORAL 2 TIMES DAILY
Qty: 20 TABLET | Refills: 0 | Status: SHIPPED | OUTPATIENT
Start: 2021-06-30 | End: 2021-07-10

## 2021-06-30 SDOH — ECONOMIC STABILITY: FOOD INSECURITY: WITHIN THE PAST 12 MONTHS, THE FOOD YOU BOUGHT JUST DIDN'T LAST AND YOU DIDN'T HAVE MONEY TO GET MORE.: NEVER TRUE

## 2021-06-30 SDOH — ECONOMIC STABILITY: TRANSPORTATION INSECURITY
IN THE PAST 12 MONTHS, HAS LACK OF TRANSPORTATION KEPT YOU FROM MEETINGS, WORK, OR FROM GETTING THINGS NEEDED FOR DAILY LIVING?: NO

## 2021-06-30 SDOH — ECONOMIC STABILITY: TRANSPORTATION INSECURITY
IN THE PAST 12 MONTHS, HAS THE LACK OF TRANSPORTATION KEPT YOU FROM MEDICAL APPOINTMENTS OR FROM GETTING MEDICATIONS?: NO

## 2021-06-30 SDOH — ECONOMIC STABILITY: FOOD INSECURITY: WITHIN THE PAST 12 MONTHS, YOU WORRIED THAT YOUR FOOD WOULD RUN OUT BEFORE YOU GOT MONEY TO BUY MORE.: NEVER TRUE

## 2021-06-30 ASSESSMENT — PATIENT HEALTH QUESTIONNAIRE - PHQ9
SUM OF ALL RESPONSES TO PHQ QUESTIONS 1-9: 0
1. LITTLE INTEREST OR PLEASURE IN DOING THINGS: 0
SUM OF ALL RESPONSES TO PHQ QUESTIONS 1-9: 0
SUM OF ALL RESPONSES TO PHQ QUESTIONS 1-9: 0
SUM OF ALL RESPONSES TO PHQ9 QUESTIONS 1 & 2: 0
2. FEELING DOWN, DEPRESSED OR HOPELESS: 0

## 2021-06-30 ASSESSMENT — SOCIAL DETERMINANTS OF HEALTH (SDOH): HOW HARD IS IT FOR YOU TO PAY FOR THE VERY BASICS LIKE FOOD, HOUSING, MEDICAL CARE, AND HEATING?: NOT HARD AT ALL

## 2021-06-30 NOTE — PROGRESS NOTES
SUBJECTIVE:  Thelma Screen is a [de-identified] y.o. female being evaluated for:    Chief Complaint   Patient presents with    Follow-Up from Hospital             HPI   Last week developed sore throat and   Saw her heart doctor on Friday and went to the ed for pharngitis  Strep test was negative Coughing green sputum  Feels sone chest tightness Nasal drainage yellow  Pressure under her eyes  No ear pain  NOt sob  No fevers or chills    Has been doing a lot of belching burping  All together does not feel well  Allergies   Allergen Reactions    Lisinopril      cough     Current Outpatient Medications   Medication Sig Dispense Refill    cefUROXime (CEFTIN) 250 MG tablet Take 1 tablet by mouth 2 times daily for 10 days 20 tablet 0    methylPREDNISolone (MEDROL DOSEPACK) 4 MG tablet Take by mouth as directed per package 1 kit 0    atorvastatin (LIPITOR) 20 MG tablet TAKE 1 TABLET BY MOUTH DAILY 90 tablet 1    metoprolol tartrate (LOPRESSOR) 25 MG tablet TAKE 1 TABLET BY MOUTH TWICE DAILY 180 tablet 1    apixaban (ELIQUIS) 5 MG TABS tablet TAKE 1 TABLET BY MOUTH TWICE DAILY 60 tablet 5    potassium chloride (KLOR-CON M) 20 MEQ extended release tablet TAKE 1 TABLET BY MOUTH EVERY DAY 90 tablet 4    amiodarone (CORDARONE) 200 MG tablet TAKE 1/2 TABLET BY MOUTH DAILY 45 tablet 4    furosemide (LASIX) 40 MG tablet TAKE 1 TABLET BY MOUTH DAILY. TAKE 1 EXTRA TABLET AS NEEDED IF WEIGHT GOES UP 2  tablet 1    Omega-3 Fatty Acids (FISH OIL) 1000 MG CAPS Take 3,000 mg by mouth daily      Coenzyme Q10 (COQ10) 100 MG CAPS Take 1 tablet by mouth daily       calcium-vitamin D (OSCAL) 250-125 MG-UNIT per tablet Take 1 tablet by mouth 2 times daily.  multivitamin (THERAGRAN) per tablet Take 1 tablet by mouth daily. No current facility-administered medications for this visit. Social History     Socioeconomic History    Marital status:       Spouse name: Not on file    Number of children: Not on file    Years of education: Not on file    Highest education level: Not on file   Occupational History    Not on file   Tobacco Use    Smoking status: Never Smoker    Smokeless tobacco: Never Used   Vaping Use    Vaping Use: Never used   Substance and Sexual Activity    Alcohol use: Not Currently    Drug use: No    Sexual activity: Not Currently     Partners: Male   Other Topics Concern    Not on file   Social History Narrative    Not on file     Social Determinants of Health     Financial Resource Strain: Low Risk     Difficulty of Paying Living Expenses: Not hard at all   Food Insecurity: No Food Insecurity    Worried About [facility] in the Last Year: Never true    Jud of Food in the Last Year: Never true   Transportation Needs: No Transportation Needs    Lack of Transportation (Medical): No    Lack of Transportation (Non-Medical):  No   Physical Activity:     Days of Exercise per Week:     Minutes of Exercise per Session:    Stress:     Feeling of Stress :    Social Connections:     Frequency of Communication with Friends and Family:     Frequency of Social Gatherings with Friends and Family:     Attends Anabaptist Services:     Active Member of Clubs or Organizations:     Attends Club or Organization Meetings:     Marital Status:    Intimate Partner Violence:     Fear of Current or Ex-Partner:     Emotionally Abused:     Physically Abused:     Sexually Abused:       Past Medical History:   Diagnosis Date    At[facility] ([facility]     Bleeding ulcer 01/2019    Fibromyalgia     History of blood transfusion 01/2019    2 units    Hyperlipidemia     Hypertension     Osteoporosis     RLS (restless legs syndrome)     Shingles     [facility]    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 COLONOSCOPY N/A 6/18/2019 COLONOSCOPY POLYPECTOMY COLD BIOPSY performed by Olga Allen MD at [address]  9/07    scalp cyst excision    CYST REMOVAL      from left breast    HYSTERECTOMY, TOTAL ABDOMINAL      with bso     OVARIAN CYST REMOVAL Right     Age 24    UPPER GASTROINTESTINAL ENDOSCOPY N/A 1/16/2019    EGD ESOPHAGOGASTRODUODENOSCOPY performed by Olga Allen MD at [address] N/A 6/18/2019    EGD BIOPSY performed by Olga Allen MD at [address] of Systems   Constitutional: Negative for chills and fever. HENT: Positive for congestion, postnasal drip, rhinorrhea, sinus pressure, sinus pain and sore throat. Negative for ear pain. Respiratory: Positive for cough and chest tightness. Negative for shortness of breath and wheezing. Cardiovascular: Negative for chest pain, palpitations and leg swelling. Gastrointestinal: Negative for anal bleeding, diarrhea, nausea and vomiting. Musculoskeletal: Negative for myalgias. Neurological: Positive for headaches. Negative for dizziness and light-headedness. OBJECTIVE:  /74 (Site: Right Upper Arm, Position: Sitting, Cuff Size: Medium Adult)   Pulse 76   Temp 98.1 °F (36.7 °C) (Temporal)   Ht 5' 2\" (1.575 m)   Wt 154 lb 6.4 oz (70 kg)   LMP  (LMP Unknown)   SpO2 97%   BMI 28.24 kg/m²      Body mass index is 28.24 kg/m². Physical Exam  Constitutional:       General: She is not in acute distress. Appearance: Normal appearance. She is well-developed. HENT:      Head: Normocephalic and atraumatic. Comments: Right maxillary tenderness     Right Ear: Tympanic membrane, ear canal and external ear normal.      Left Ear: Tympanic membrane, ear canal and external ear normal.      Nose: Congestion present. Mouth/Throat:      Pharynx: Posterior oropharyngeal erythema present. No oropharyngeal exudate.    Eyes:      Conjunctiva/sclera: Conjunctivae normal.   Neck: Thyroid: No thyromegaly. Vascular: No carotid bruit or JVD. Comments: No tm or jvd   Cardiovascular:      Rate and Rhythm: Normal rate and regular rhythm. Heart sounds: Normal heart sounds. No murmur heard. No friction rub. No gallop. Pulmonary:      Effort: Pulmonary effort is normal.      Breath sounds: Rhonchi present. No wheezing or rales. Chest:      Chest wall: No tenderness. Abdominal:      General: There is no distension. Palpations: Abdomen is soft. Tenderness: There is no abdominal tenderness. Comments: No hepatosplenomegaly   Musculoskeletal:         General: No swelling. Cervical back: Neck supple. No rigidity. Lymphadenopathy:      Cervical: No cervical adenopathy. Skin:     General: Skin is warm and dry. Findings: No rash. Neurological:      General: No focal deficit present. Mental Status: She is alert. Gait: Gait normal.   Psychiatric:         Behavior: Behavior normal.         Thought Content: Thought content normal.      Comments: Mildly anxious         ASSESSMENT/PLAN:    Royal bain was seen today for follow-up from hospital.    Diagnoses and all orders for this visit:    Bronchitis  -     cefUROXime (CEFTIN) 250 MG tablet; Take 1 tablet by mouth 2 times daily for 10 days  -     methylPREDNISolone (MEDROL DOSEPACK) 4 MG tablet; Take by mouth as directed per package    Chronic diastolic (congestive) heart failure (HCC) stable and did just follow-up with her cardiologist  Sign of active failure      Orders Placed This Encounter   Medications    cefUROXime (CEFTIN) 250 MG tablet     Sig: Take 1 tablet by mouth 2 times daily for 10 days     Dispense:  20 tablet     Refill:  0    methylPREDNISolone (MEDROL DOSEPACK) 4 MG tablet     Sig: Take by mouth as directed per package     Dispense:  1 kit     Refill:  0        Return if symptoms worsen or fail to improve. There are no Patient Instructions on file for this visit.

## 2021-06-30 NOTE — TELEPHONE ENCOUNTER
[address] Coordinator stated that pt was in the ED and she is not getting any better, Riya Jones thinks that the pt is needing to be seen, but she is needing a 4:30 or later apt. Pt is now coughing and coughing up green/yellow.    Denies   SOB   Fever   Vomiting     Pl advise

## 2021-07-05 ASSESSMENT — ENCOUNTER SYMPTOMS
RHINORRHEA: 1
DIARRHEA: 0
VOMITING: 0
NAUSEA: 0
SINUS PAIN: 1
COUGH: 1
SHORTNESS OF BREATH: 0
SORE THROAT: 1
CHEST TIGHTNESS: 1
ANAL BLEEDING: 0
WHEEZING: 0
SINUS PRESSURE: 1

## 2021-09-22 ENCOUNTER — TELEPHONE (OUTPATIENT)
Dept: FAMILY MEDICINE CLINIC | Age: 81
End: 2021-09-22

## 2021-09-22 RX ORDER — FUROSEMIDE 40 MG/1
TABLET ORAL
Qty: 180 TABLET | Refills: 1 | Status: SHIPPED | OUTPATIENT
Start: 2021-09-22 | End: 2022-08-11

## 2021-09-22 NOTE — TELEPHONE ENCOUNTER
Pt needs a refill on furosemide (LASIX) 40 MG tablet to [facility] to [facility] [place]. Pt would like for this to be sent in soon b/c she only has 12 pills left and she's afraid that she might have to take more than 1 pill daily. Pl call pt when this has been done.

## 2021-10-22 ENCOUNTER — TELEPHONE (OUTPATIENT)
Dept: FAMILY MEDICINE CLINIC | Age: 81
End: 2021-10-22

## 2021-10-22 NOTE — TELEPHONE ENCOUNTER
----- Message from Armando Solermitch sent at 10/22/2021  2:56 PM EDT -----  Subject: Appointment Request    Reason for Call: Semi-Routine Skin Problem    QUESTIONS  Type of Appointment? Established Patient  Reason for appointment request? Available appointments did not meet   patient need  Additional Information for Provider? Patient called stating that she has a   moveable bump on her left hand. She said that it has moved about 1/2\" and   she is concerned that it could go to her heart. The system would only   search appts from 10/22-10/26 and no appt avail with Dontrell Hollins. Patient   only wants to see Dontrell Hollins but she would like a call back asa. Please   return her call.   ---------------------------------------------------------------------------  --------------  CALL BACK INFO  What is the best way for the office to contact you? OK to leave message on   voicemail  Preferred Call Back Phone Number? [phone]  ---------------------------------------------------------------------------  --------------  SCRIPT ANSWERS  Relationship to Patient? Self  Are you having swelling in your throat or face? No  Are you having difficulty breathing? No  Have the symptoms worsened or spread in the last day? No  Are you having fevers (100.4), chills or sweats? No  Have you recently (14 days) seen a provider for this issue? No  Have you been diagnosed with, awaiting test results for, or told that you   are suspected of having COVID-19 (Coronavirus)? (If patient has tested   negative or was tested as a requirement for work, school, or travel and   not based on symptoms, answer no)? No  Within the past two weeks have you developed any of the following symptoms   (answer no if symptoms have been present longer than 2 weeks or began   more than 2 weeks ago)?  Fever or Chills, Cough, Shortness of breath or   difficulty breathing, Loss of taste or smell, Sore throat, Nasal   congestion, Sneezing or runny nose, Fatigue or generalized body aches (answer no if pain is specific to a body part e.g. back pain), Diarrhea,   Headache? No  Have you had close contact with someone with COVID-19 in the last 14 days? No  (Service Expert  click yes below to proceed with [employer] As Usual   Scheduling)?  Yes

## 2021-10-25 ENCOUNTER — OFFICE VISIT (OUTPATIENT)
Dept: FAMILY MEDICINE CLINIC | Age: 81
End: 2021-10-25
Payer: MEDICARE

## 2021-10-25 VITALS
TEMPERATURE: 97.8 F | HEIGHT: 62 IN | WEIGHT: 149.8 LBS | HEART RATE: 65 BPM | BODY MASS INDEX: 27.57 KG/M2 | DIASTOLIC BLOOD PRESSURE: 74 MMHG | SYSTOLIC BLOOD PRESSURE: 118 MMHG | OXYGEN SATURATION: 98 %

## 2021-10-25 DIAGNOSIS — I10 ESSENTIAL HYPERTENSION: ICD-10-CM

## 2021-10-25 DIAGNOSIS — L98.9 HAND LESION: Primary | ICD-10-CM

## 2021-10-25 DIAGNOSIS — R73.03 PREDIABETES: ICD-10-CM

## 2021-10-25 DIAGNOSIS — I48.91 ATRIAL FIBRILLATION WITH RVR (HCC): ICD-10-CM

## 2021-10-25 DIAGNOSIS — Z51.81 MEDICATION MONITORING ENCOUNTER: ICD-10-CM

## 2021-10-25 DIAGNOSIS — E78.2 MIXED HYPERLIPIDEMIA: ICD-10-CM

## 2021-10-25 DIAGNOSIS — G25.81 RLS (RESTLESS LEGS SYNDROME): ICD-10-CM

## 2021-10-25 PROCEDURE — 1090F PRES/ABSN URINE INCON ASSESS: CPT | Performed by: INTERNAL MEDICINE

## 2021-10-25 PROCEDURE — 1123F ACP DISCUSS/DSCN MKR DOCD: CPT | Performed by: INTERNAL MEDICINE

## 2021-10-25 PROCEDURE — G8417 CALC BMI ABV UP PARAM F/U: HCPCS | Performed by: INTERNAL MEDICINE

## 2021-10-25 PROCEDURE — G8399 PT W/DXA RESULTS DOCUMENT: HCPCS | Performed by: INTERNAL MEDICINE

## 2021-10-25 PROCEDURE — 4040F PNEUMOC VAC/ADMIN/RCVD: CPT | Performed by: INTERNAL MEDICINE

## 2021-10-25 PROCEDURE — 1036F TOBACCO NON-USER: CPT | Performed by: INTERNAL MEDICINE

## 2021-10-25 PROCEDURE — 99213 OFFICE O/P EST LOW 20 MIN: CPT | Performed by: INTERNAL MEDICINE

## 2021-10-25 PROCEDURE — G8484 FLU IMMUNIZE NO ADMIN: HCPCS | Performed by: INTERNAL MEDICINE

## 2021-10-25 PROCEDURE — G8427 DOCREV CUR MEDS BY ELIG CLIN: HCPCS | Performed by: INTERNAL MEDICINE

## 2021-10-25 RX ORDER — ROPINIROLE 0.25 MG/1
0.25 TABLET, FILM COATED ORAL 3 TIMES DAILY
Qty: 90 TABLET | Refills: 3 | Status: SHIPPED | OUTPATIENT
Start: 2021-10-25 | End: 2022-03-30

## 2021-10-25 NOTE — PROGRESS NOTES
SUBJECTIVE:  Violeta Stubbs is a [de-identified] y.o. female being evaluated for:    Chief Complaint   Patient presents with    Mass      Patient have the bump on left hand that moves. HPI   Left palm with small nodules moving on her and causing pain  Hand crushed in machine but more in the fingers and not into the hand   No fevers or chills     Veins in left leg enlarging not painful no swelling heart is good  No fast heart rate  No chest or sob  Occassionaly legs become restless     Allergies   Allergen Reactions    Lisinopril      cough     Current Outpatient Medications   Medication Sig Dispense Refill    rOPINIRole (REQUIP) 0.25 MG tablet Take 1 tablet by mouth 3 times daily 90 tablet 3    metoprolol tartrate (LOPRESSOR) 25 MG tablet TAKE 1 TABLET BY MOUTH TWICE DAILY 180 tablet 1    furosemide (LASIX) 40 MG tablet TAKE 1 TABLET BY MOUTH DAILY. TAKE 1 EXTRA TABLET AS NEEDED IF WEIGHT GOES UP 2  tablet 1    apixaban (ELIQUIS) 5 MG TABS tablet TAKE 1 TABLET BY MOUTH TWICE DAILY 60 tablet 5    atorvastatin (LIPITOR) 20 MG tablet TAKE 1 TABLET BY MOUTH DAILY 90 tablet 1    potassium chloride (KLOR-CON M) 20 MEQ extended release tablet TAKE 1 TABLET BY MOUTH EVERY DAY 90 tablet 4    amiodarone (CORDARONE) 200 MG tablet TAKE 1/2 TABLET BY MOUTH DAILY 45 tablet 4    Omega-3 Fatty Acids (FISH OIL) 1000 MG CAPS Take 3,000 mg by mouth daily      Coenzyme Q10 (COQ10) 100 MG CAPS Take 1 tablet by mouth daily       calcium-vitamin D (OSCAL) 250-125 MG-UNIT per tablet Take 1 tablet by mouth 2 times daily.  multivitamin (THERAGRAN) per tablet Take 1 tablet by mouth daily. No current facility-administered medications for this visit. Social History     Socioeconomic History    Marital status:       Spouse name: Not on file    Number of children: Not on file    Years of education: Not on file    Highest education level: Not on file   Occupational History    Not on file   Tobacco Use    Smoking status: Never Smoker    Smokeless tobacco: Never Used   Vaping Use    Vaping Use: Never used   Substance and Sexual Activity    Alcohol use: Not Currently    Drug use: No    Sexual activity: Not Currently     Partners: Male   Other Topics Concern    Not on file   Social History Narrative    Not on file     Social Determinants of Health     Financial Resource Strain: Low Risk     Difficulty of Paying Living Expenses: Not hard at all   Food Insecurity: No Food Insecurity    Worried About Running Out of Food in the Last Year: Never true    [address] in the Last Year: Never true   Transportation Needs: No Transportation Needs    Lack of Transportation (Medical): No    Lack of Transportation (Non-Medical):  No   Physical Activity:     Days of Exercise per Week: Not on file    Minutes of Exercise per Session: Not on file   Stress:     Feeling of Stress : Not on file   Social Connections:     Frequency of Communication with Friends and Family: Not on file    Frequency of Social Gatherings with Friends and Family: Not on file    Attends Restoration Services: Not on file    Active Member of [address] [place] [address] or Organizations: Not on file    Attends Club or Organization Meetings: Not on file    Marital Status: Not on file   Intimate Partner Violence:     Fear of Current or Ex-Partner: Not on file    Emotionally Abused: Not on file    Physically Abused: Not on file    Sexually Abused: Not on file   Housing Stability:     Unable to Pay for Housing in the Last Year: Not on file    Number of [place] in the Last Year: Not on file    Unstable Housing in the Last Year: Not on file      Past Medical History:   Diagnosis Date    [facility] ([facility]     Bleeding ulcer 01/2019    Fibromyalgia     History of blood transfusion 01/2019    2 units    Hyperlipidemia     Hypertension     Osteoporosis     RLS (restless legs syndrome)     Shingles     Stroke [facility]) 2004    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      Past Surgical History:   Procedure Laterality Date    BREAST SURGERY Left     lumpectomy    CARDIOVERSION      Rashida May     COLONOSCOPY N/A 6/18/2019    COLONOSCOPY POLYPECTOMY COLD BIOPSY performed by Cookie Davis MD at [address]  9/07    scalp cyst excision    CYST REMOVAL      from left breast    HYSTERECTOMY, TOTAL ABDOMINAL      with bso     OVARIAN CYST REMOVAL Right     Age 24    UPPER GASTROINTESTINAL ENDOSCOPY N/A 1/16/2019    EGD ESOPHAGOGASTRODUODENOSCOPY performed by Cookie Davis MD at [place] [address] N/A 6/18/2019    EGD BIOPSY performed by Cookie Davis MD at [address] [place] [address] of Systems   Constitutional: Negative for activity change, chills and fever. HENT: Negative for nosebleeds. Respiratory: Negative for cough and shortness of breath. Cardiovascular: Negative for chest pain, palpitations and leg swelling. Gastrointestinal: Negative for abdominal pain, blood in stool, diarrhea, nausea and vomiting. Neurological: Negative for dizziness, numbness and headaches. OBJECTIVE:  /74 (Site: Left Upper Arm, Position: Sitting, Cuff Size: Medium Adult)   Pulse 65   Temp 97.8 °F (36.6 °C) (Oral)   Ht 5' 2\" (1.575 m)   Wt 149 lb 12.8 oz (67.9 kg)   LMP  (LMP Unknown)   SpO2 98%   BMI 27.40 kg/m²      Body mass index is 27.4 kg/m². Physical Exam  Constitutional:       General: She is not in acute distress. Appearance: Normal appearance. She is well-developed. HENT:      Head: Normocephalic and atraumatic. Eyes:      Conjunctiva/sclera: Conjunctivae normal.   Neck:      Thyroid: No thyromegaly. Vascular: No JVD. Comments: No JVD or TM  Cardiovascular:      Rate and Rhythm: Normal rate and regular rhythm. Heart sounds: Normal heart sounds. No murmur heard. No friction rub. No gallop.     Pulmonary:      Effort: Pulmonary effort is normal. Breath sounds: Normal breath sounds. Chest:      Chest wall: No tenderness. Abdominal:      General: There is no distension. Palpations: Abdomen is soft. Tenderness: There is no abdominal tenderness. Comments: No hepatosplenomegaly   Musculoskeletal:         General: No swelling. Cervical back: Neck supple. Comments: Tendon contracture firmness in left palm   Lymphadenopathy:      Cervical: No cervical adenopathy. Skin:     General: Skin is warm and dry. Findings: No erythema. Neurological:      General: No focal deficit present. Mental Status: She is alert. Gait: Gait normal.   Psychiatric:         Behavior: Behavior normal.         Thought Content: Thought content normal.         ASSESSMENT/PLAN:    Brandon Desouza was seen today for mass. Diagnoses and all orders for this visit:    Hand lesion tendon contracture  -     Rashida Araujo MD, Hand Surgery (Hand, Wrist, Upper Extremity), [facility]    RLS (restless legs syndrome)  Alright with medications and needs refill  -     rOPINIRole (REQUIP) 0.25 MG tablet; Take 1 tablet by mouth 3 times daily    Atrial fibrillation with RVR (HCC)  -     CBC Auto Differential; Future  -     TSH without Reflex; Future    Mixed hyperlipidemia  -     Comprehensive Metabolic Panel; Future  -     Lipid Panel; Future    Essential hypertension controlled  -     Comprehensive Metabolic Panel; Future    Prediabetes  -     Hemoglobin A1C; Future    Medication monitoring encounter  -     CBC Auto Differential; Future  -     TSH without Reflex; Future      Labs ordered for follow-up visit    Orders Placed This Encounter   Medications    rOPINIRole (REQUIP) 0.25 MG tablet     Sig: Take 1 tablet by mouth 3 times daily     Dispense:  90 tablet     Refill:  3        Return if symptoms worsen or fail to improve, for medicare wellness  in nov or dec after nov 10th . There are no Patient Instructions on file for this visit.

## 2021-11-06 ASSESSMENT — ENCOUNTER SYMPTOMS
VOMITING: 0
NAUSEA: 0
COUGH: 0
DIARRHEA: 0
SHORTNESS OF BREATH: 0
BLOOD IN STOOL: 0
ABDOMINAL PAIN: 0

## 2021-11-13 DIAGNOSIS — E78.2 MIXED HYPERLIPIDEMIA: ICD-10-CM

## 2021-11-13 DIAGNOSIS — I63.30 CEREBROVASCULAR ACCIDENT (CVA) DUE TO THROMBOSIS OF CEREBRAL ARTERY (HCC): ICD-10-CM

## 2021-11-15 ENCOUNTER — OFFICE VISIT (OUTPATIENT)
Dept: ORTHOPEDIC SURGERY | Age: 81
End: 2021-11-15
Payer: MEDICARE

## 2021-11-15 VITALS — RESPIRATION RATE: 16 BRPM | HEIGHT: 62 IN | WEIGHT: 149 LBS | BODY MASS INDEX: 27.42 KG/M2

## 2021-11-15 DIAGNOSIS — R22.32 MASS OF LEFT HAND: Primary | ICD-10-CM

## 2021-11-15 PROCEDURE — 1036F TOBACCO NON-USER: CPT | Performed by: PHYSICIAN ASSISTANT

## 2021-11-15 PROCEDURE — G8484 FLU IMMUNIZE NO ADMIN: HCPCS | Performed by: PHYSICIAN ASSISTANT

## 2021-11-15 PROCEDURE — 1090F PRES/ABSN URINE INCON ASSESS: CPT | Performed by: PHYSICIAN ASSISTANT

## 2021-11-15 PROCEDURE — 99203 OFFICE O/P NEW LOW 30 MIN: CPT | Performed by: PHYSICIAN ASSISTANT

## 2021-11-15 PROCEDURE — 1123F ACP DISCUSS/DSCN MKR DOCD: CPT | Performed by: PHYSICIAN ASSISTANT

## 2021-11-15 PROCEDURE — 4040F PNEUMOC VAC/ADMIN/RCVD: CPT | Performed by: PHYSICIAN ASSISTANT

## 2021-11-15 PROCEDURE — G8427 DOCREV CUR MEDS BY ELIG CLIN: HCPCS | Performed by: PHYSICIAN ASSISTANT

## 2021-11-15 PROCEDURE — G8399 PT W/DXA RESULTS DOCUMENT: HCPCS | Performed by: PHYSICIAN ASSISTANT

## 2021-11-15 PROCEDURE — G8417 CALC BMI ABV UP PARAM F/U: HCPCS | Performed by: PHYSICIAN ASSISTANT

## 2021-11-15 RX ORDER — AMIODARONE HYDROCHLORIDE 200 MG/1
TABLET ORAL
Qty: 45 TABLET | Refills: 4 | Status: SHIPPED | OUTPATIENT
Start: 2021-11-15 | End: 2022-03-18 | Stop reason: SDUPTHER

## 2021-11-15 RX ORDER — ATORVASTATIN CALCIUM 20 MG/1
20 TABLET, FILM COATED ORAL DAILY
Qty: 90 TABLET | Refills: 1 | Status: SHIPPED | OUTPATIENT
Start: 2021-11-15 | End: 2022-02-14

## 2021-11-15 NOTE — PROGRESS NOTES
Ms. Chelly Branham is a [de-identified] y.o. right handed woman  who is seen today in Hand Surgical Consultation at the request of Russell Marcum MD.    She presents today regarding left Ring Finger symptoms which have been present for approximately 3 months. A history of antecedent trauma or injury is Absent. She reports a mass on the Volar aspect of the hand along the ring finger with symptoms that include No Symptoms. Finger symptoms are exacerbated with no activity exacerbates the symptoms. The size of the mass has been stable with time and change in activity level. Previous treatment has included no prior treatments used. She does not claim relation of her symptoms to her required work activities. She has not undergone any form of testing. I have today reviewed with Chelly Branham the clinically relevant, past medical history, medications, allergies,  family history, social history, and Review Of Systems & I have documented any details relevant to today's presenting complaints in my history above. Ms. Miguel Angel Nation's self-reported past medical history, medications, allergies,  family history, social history, and Review Of Systems have been scanned into the chart under the \"Media\" tab. Physical Exam:  Ms. Miguel Angel Nation's most recent vitals:  Vitals  Resp: 16  Height: 5' 2\" (157.5 cm)  Weight: 149 lb (67.6 kg)    She is well nourished, oriented to person, place & time. She demonstrates appropriate mood and affect as well as normal gait and station. 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 Wrist range of motion is without significant limitation bilaterally. There is no evidence of gross joint instability bilaterally. Sensation is normal in the Whole Hand bilaterally  Muscular strength is clinically appropriate bilaterally.   Vascular examination reveals normal, good capillary refill and good color bilaterally  There is no Swelling bilaterally  There is a 4 mm blue tinged Mobile mass on the  Volar aspect of the palm nearest to the MCP head. The mass is not tender to palpation, unchanged in maximal digital flexion/extension. Impression:  Ms. Desiree Denton is showing clinical evidence of a Volar palmar mass and presents requesting further treatment. Plan:  I have had a thorough discussion with Ms. Desiree Denton regarding the treatment options available for her initially presenting left palmar mass, which is causing her significant symptoms and difficulty. I have outlined for Ms. Desiree Denton the risk, benefits and consequences of the various treatment modalities, including a reasonable expectation for the long term success of each. We have discussed the possibility that further, more aggressive treatment may be required for her current presenting condition. Based upon our current discussion and a reasonable understating of the options available to her, Ms. Desiree Denton has selected to proceed with a conservative plan of treatment consisting of: the use of interval monitoring of the size and character of the mass, activity modification, and the judicious use of over-the-counter anti-inflammatory medications if allowed by her primary care physician. Instructions were given as well as suggestions for use of the other modalities were discussed. I have clearly explained to her that the above outlined treatment plan should not be expected to 'cure' her  finger mass, but we are rather treating the symptoms with which she presents. She has understood that in order to achieve more durable relief of her symptoms and to prevent future worsening or further damage, that definitive surgical treatment would be required. Ms. Desiree Denton  voiced an appropriate understanding of our discussion, the options available to her, and of the expectations of her selected  treatment.     I have also discussed with Ms. Desiree Denton  the other treatment options available to her  for this condition. We have today selected to proceed with conservative management. She and I have agreed that if our current course of conservative treatment does not prove to be effective over the short term future, that she will schedule a follow-up appointment to discuss and select an alternate course of therapy including possibly injection or surgical treatment. Ms. Desiree Denton has been given a full verbal list of instructions and precautions related to her present condition. I have asked her to followup with me in the office at the prescribed time. She is also specifically requested to call or return to the office sooner if her symptoms change or worsen prior to the next scheduled appointment.

## 2021-11-15 NOTE — PATIENT INSTRUCTIONS
Thank you for choosing [facility]) Physicians for your Hand and Upper Extremity needs. If we can be of any further assistance to you, please do not hesitate to contact us.     Office Phone Number:  [phone]  or  [phone]

## 2021-12-20 RX ORDER — POTASSIUM CHLORIDE 20 MEQ/1
TABLET, EXTENDED RELEASE ORAL
Qty: 90 TABLET | Refills: 0 | Status: SHIPPED | OUTPATIENT
Start: 2021-12-20 | End: 2022-03-18 | Stop reason: SDUPTHER

## 2022-02-14 DIAGNOSIS — I63.30 CEREBROVASCULAR ACCIDENT (CVA) DUE TO THROMBOSIS OF CEREBRAL ARTERY (HCC): ICD-10-CM

## 2022-02-14 DIAGNOSIS — E78.2 MIXED HYPERLIPIDEMIA: ICD-10-CM

## 2022-02-14 RX ORDER — ATORVASTATIN CALCIUM 20 MG/1
20 TABLET, FILM COATED ORAL DAILY
Qty: 90 TABLET | Refills: 0 | Status: SHIPPED | OUTPATIENT
Start: 2022-02-14

## 2022-02-15 NOTE — TELEPHONE ENCOUNTER
Patient advise and verbalized understanding . Patient said she will call us back to make the appointment and informed to  Have the lab work done .

## 2022-03-07 PROBLEM — I48.0 PAF (PAROXYSMAL ATRIAL FIBRILLATION) (HCC): Status: ACTIVE | Noted: 2019-07-02

## 2022-03-07 PROBLEM — Z79.899 LONG TERM CURRENT USE OF AMIODARONE: Status: ACTIVE | Noted: 2022-03-07

## 2022-03-07 NOTE — PROGRESS NOTES
[facility]        Cardiology Progress Note     Edna Moss  1940 March 18, 2022    CC: \"I am feeling good  \"     HPI:  The patient is 80 y.o. female with a past medical history significant for AFIB, SR with LBBB, HLD, HTN, borderline diabetic, and a past stroke. Today, she is here for follow up. Pt is [place]. She states that she is feeling well. She continues to work and denies episodes of racing heart beat. The patient denies exertional chest pain, palpitations, dizziness, syncope, worsening leg swelling and worsening dyspnea. She reports compliance with her medications. She did not have PFT's and lab work done as previously ordered due to having COVID. She has since received her vaccinations.        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 N/A 6/18/2019    EGD BIOPSY performed by Ferdinand Duverney, MD at [address] History   Problem Relation Age of Onset    Heart Disease Mother         CAD    Diabetes Mother     Diabetes Maternal Grandmother     High Blood Pressure Maternal Grandfather     No Known Problems Paternal Grandmother     No Known Problems Paternal Grandfather     Diabetes Maternal Aunt      Social History     Tobacco Use    Smoking status: Never Smoker    Smokeless tobacco: Never Used   Vaping Use    Vaping Use: Never used   Substance Use Topics    Alcohol use: Not Currently    Drug use: No       Allergies   Allergen Reactions    Lisinopril      cough     Current Outpatient Medications   Medication Sig Dispense Refill    atorvastatin (LIPITOR) 20 MG tablet TAKE 1 TABLET BY MOUTH DAILY 90 tablet 0    apixaban (ELIQUIS) 5 MG TABS tablet TAKE 1 TABLET BY MOUTH TWICE DAILY 60 tablet 5    metoprolol tartrate (LOPRESSOR) 25 MG tablet TAKE 1 TABLET BY MOUTH TWICE DAILY 180 tablet 1    potassium chloride (KLOR-CON M) 20 MEQ extended release tablet TAKE 1 TABLET BY MOUTH EVERY DAY 90 tablet 0    amiodarone (CORDARONE) 200 MG tablet TAKE 1/2 TABLET BY MOUTH DAILY 45 tablet 4    rOPINIRole (REQUIP) 0.25 MG tablet Take 1 tablet by mouth 3 times daily 90 tablet 3    furosemide (LASIX) 40 MG tablet TAKE 1 TABLET BY MOUTH DAILY. TAKE 1 EXTRA TABLET AS NEEDED IF WEIGHT GOES UP 2  tablet 1    Omega-3 Fatty Acids (FISH OIL) 1000 MG CAPS Take 3,000 mg by mouth daily      Coenzyme Q10 (COQ10) 100 MG CAPS Take 1 tablet by mouth daily       calcium-vitamin D (OSCAL) 250-125 MG-UNIT per tablet Take 1 tablet by mouth 2 times daily.  multivitamin (THERAGRAN) per tablet Take 1 tablet by mouth daily. No current facility-administered medications for this visit. Review of Systems:  · Constitutional: no unanticipated weight loss. There's been no change in energy level, sleep pattern, or activity level. No fevers, chills. · Eyes: No visual changes or diplopia. No scleral icterus. · ENT: No Headaches, hearing loss or vertigo. No mouth sores or sore throat. · Cardiovascular: as reviewed in HPI  · Respiratory: No cough or wheezing, no sputum production.  No hematemesis. · Gastrointestinal: No abdominal pain, appetite loss, blood in stools. No change in bowel or bladder habits. · Genitourinary: No dysuria, trouble voiding, or hematuria. · Musculoskeletal:  No gait disturbance, no joint complaints. · Integumentary: No rash or pruritis. · Neurological: No headache, diplopia, change in muscle strength, numbness or tingling. · Psychiatric: No anxiety or depression. · Endocrine: No temperature intolerance. No excessive thirst, fluid intake, or urination. No tremor. · Hematologic/Lymphatic: No abnormal bruising or bleeding, blood clots or swollen lymph nodes. · Allergic/Immunologic: No nasal congestion or hives. Physical Exam:   /70   Pulse 65   Ht 5' 3\" (1.6 m)   Wt 148 lb (67.1 kg)   LMP  (LMP Unknown)   SpO2 99%   BMI 26.22 kg/m²   Wt Readings from Last 3 Encounters:   03/18/22 148 lb (67.1 kg)   11/15/21 149 lb (67.6 kg)   10/25/21 149 lb 12.8 oz (67.9 kg)     Constitutional: She is oriented to person, place, and time. She appears well-developed and well-nourished. In no acute distress. Head: Normocephalic and atraumatic. Pupils equal and round. Neck: Neck supple. No JVP or carotid bruit appreciated. No mass and no thyromegaly present. No lymphadenopathy present. Cardiovascular: Regular heart rhythm. Exam reveals no gallop and no friction rub. No murmur heard. Pulmonary/Chest: Effort normal and breath sounds normal. No respiratory distress. She has no wheezes, rhonchi or rales. Abdominal: Soft, non-tender. Bowel sounds are normal. She exhibits no organomegaly, mass or bruit. Extremities: No edema, cyanosis, or clubbing. Pulses are 2+ radial/dorsalis pedis/posterior tibial/carotid bilaterally. Neurological: No gross cranial nerve deficit. Coordination normal.   Skin: Skin is warm and dry. There is no rash or diaphoresis. Psychiatric: She has a normal mood and affect.  Her speech is normal and behavior is normal.     Lab Review:   Lab Results   Component Value Date    TRIG 123 10/31/2020    HDL 71 10/31/2020    HDL 42 05/15/2012    LDLCALC 85 10/31/2020    LABVLDL 25 10/31/2020     Lab Results   Component Value Date    BUN 34 10/31/2020    CREATININE 1.0 10/31/2020     EKG Interpretation:   6/30/16 Normal Sinus Rhythm with old ASMI with frequent PAC's and isolated PVC. 11/12/18: Atrial fibrillation rate 69  12/17/19: Sinus rhythm LBBB  12/1/20:  Sinus  Rhythm with Left bundle branch block. 6/22/21: SB with LBBB, ~56 npm.   3/18/22 SB- reviewed    Image Review:     Holter Monitor 3/2016  NSR  Frequent Supraventricular ectopies with frequent runs of PSVT/PAT    Stress 3/11/2016  Summary   unable to gate due to irreg hr      Normal stress myocardial perfusion. There is normal isotope uptake at stress and rest. There is no evidence of   myocardial ischemia or scar. ECHO 3/11/2016  Left ventricle size is normal.  Normal left ventricular wall thickness. Ejection fraction is lower limit of normal visually estimated to be 50 %. No regional wall motion abnormalities are noted. Normal diastolic function. Mild mitral regurgitation is present. Trace to mild tricuspid regurgitation with an RVSP of 36 mmHg    ECHO 6/17/2013 ([facility])  Summary:  The left ventricular wall motion is normal.  Left ventricular systolic function is normal. (An ejection fraction  >or= to 55% is considered normal)  The right ventricle is borderline dilated. The left atrium is mildly dilated. The right atrium is mildly dilated. There is mild mitral regurgitation. Mild - moderate tricuspid regurgitation is present. Lexiscan Stress 6/18/2013  ([facility])  IMAGING FINDINGS:  Stress Images:   Stress images show minimal decreased radiotracer uptake along the anterior wall of the left ventricle near the base. Rest images:   Rest images show normal perfusion with redistribution of the small area in the anterior wall base. LVEF:  72 percent .   (Normal is 50% or greater). LV wall motion:   Normal  LVEDV:   91 mL        (Normal is up to 100ml for women and 150ml for men). Transient dilatation ratio:  1.1.  (Normal is up to 1.3 for women and 1.2 for men). ECHO 10/26/18  Left ventricular cavity size is normal.  Normal left ventricular wall thickness. Difficult to estimate EF due to rate/rhythm/arrhythmia but is lower limit of  normal  Mitral annular calcification is present. Mitral valve leaflets appear mildly thickened. Mild mitral regurgitation. The left atrium is dilated. right ventricular is slightly increased in size. Mild tricuspid regurgitation with RVSP of 37 mmHg. The right atrium is dilated. Assessment/Plan:     Atrial fibrillation   She is S/p DCCV on 9/11/19. Reported symptoms of dizziness, palpitations, heart racing or shortness of breath and EKG at that time 6/2022 confirms sinus bradycardia. She appears in regular rhythm. Continue Eliquis 5 mg BID, amiodarone and Lopressor. TSH wnl. She did not complete PFT's due to COVID-19 pandemic. I have encouraged her to have PFTs and TSH done. EKG today confirms sinus bradycardia. Hypertension, essential   Blood pressure is stable today. BMP 10/31/20 remains abnormal but completely stable. Hyperlipidemia  LDL goal < 100. LDL 85 10/2020. Repeat lipids ordered 10/2021 but not done. Continue Lipitor 20 mg daily. Fasting lipid profile, CMP soon      BLE edema  Continue Lasix and  compression stockings. Low salt diet discussed and advised to elevate legs as much as possible. Stable. Follow up in 6-7 months. Thank you very much for allowing me to participate in the care of your patient. Please do not hesitate to contact me if you have any questions.     Sincerely,  Maisha Russell MD      [facility], [address] [place] [address]  Ph: [phone]  Fax: [phone]    This note was scribed in the presence of the physician by Kymberly Fisher CABRERA.

## 2022-03-17 ENCOUNTER — TELEPHONE (OUTPATIENT)
Dept: FAMILY MEDICINE CLINIC | Age: 82
End: 2022-03-17

## 2022-03-17 NOTE — TELEPHONE ENCOUNTER
----- Message from Rocky Erwin sent at 3/17/2022  4:06 PM EDT -----  Subject: Appointment Request    Reason for Call: Routine Existing Condition Follow Up    QUESTIONS  Type of Appointment? Established Patient  Reason for appointment request? No appointments available during search  Additional Information for Provider? Patient is needing person appointment   wants a 4:45pm appointment but its only showing virtual up until May so   wanting to switch one to in person. ---------------------------------------------------------------------------  --------------  Garry VILLARREAL  What is the best way for the office to contact you? OK to leave message on   voicemail  Preferred Call Back Phone Number? [phone]  ---------------------------------------------------------------------------  --------------  SCRIPT ANSWERS  Relationship to Patient? Self  Is this follow up request related to routine Diabetes Management? No  Have you been diagnosed with, awaiting test results for, or told that you   are suspected of having COVID-19 (Coronavirus)? (If patient has tested   negative or was tested as a requirement for work, school, or travel and   not based on symptoms, answer no)? No  Within the past 10 days have you developed any of the following symptoms   (answer no if symptoms have been present longer than 10 days or began   more than 10 days ago)? Fever or Chills, Cough, Shortness of breath or   difficulty breathing, Loss of taste or smell, Sore throat, Nasal   congestion, Sneezing or runny nose, Fatigue or generalized body aches   (answer no if pain is specific to a body part e.g. back pain), Diarrhea,   Headache? No  Have you had close contact with someone with COVID-19 in the last 7 days? No  (Service Expert  click yes below to proceed with [employer] As Usual   Scheduling)?  Yes

## 2022-03-18 ENCOUNTER — OFFICE VISIT (OUTPATIENT)
Dept: CARDIOLOGY CLINIC | Age: 82
End: 2022-03-18
Payer: MEDICARE

## 2022-03-18 VITALS
DIASTOLIC BLOOD PRESSURE: 70 MMHG | OXYGEN SATURATION: 99 % | HEART RATE: 65 BPM | WEIGHT: 148 LBS | BODY MASS INDEX: 26.22 KG/M2 | SYSTOLIC BLOOD PRESSURE: 118 MMHG | HEIGHT: 63 IN

## 2022-03-18 DIAGNOSIS — I48.0 PAF (PAROXYSMAL ATRIAL FIBRILLATION) (HCC): Primary | ICD-10-CM

## 2022-03-18 DIAGNOSIS — Z79.899 LONG TERM CURRENT USE OF AMIODARONE: ICD-10-CM

## 2022-03-18 DIAGNOSIS — E78.5 HYPERLIPIDEMIA, UNSPECIFIED HYPERLIPIDEMIA TYPE: ICD-10-CM

## 2022-03-18 DIAGNOSIS — I10 ESSENTIAL HYPERTENSION: ICD-10-CM

## 2022-03-18 PROCEDURE — G8399 PT W/DXA RESULTS DOCUMENT: HCPCS | Performed by: INTERNAL MEDICINE

## 2022-03-18 PROCEDURE — G8428 CUR MEDS NOT DOCUMENT: HCPCS | Performed by: INTERNAL MEDICINE

## 2022-03-18 PROCEDURE — G8417 CALC BMI ABV UP PARAM F/U: HCPCS | Performed by: INTERNAL MEDICINE

## 2022-03-18 PROCEDURE — 1090F PRES/ABSN URINE INCON ASSESS: CPT | Performed by: INTERNAL MEDICINE

## 2022-03-18 PROCEDURE — 1123F ACP DISCUSS/DSCN MKR DOCD: CPT | Performed by: INTERNAL MEDICINE

## 2022-03-18 PROCEDURE — 1036F TOBACCO NON-USER: CPT | Performed by: INTERNAL MEDICINE

## 2022-03-18 PROCEDURE — 99214 OFFICE O/P EST MOD 30 MIN: CPT | Performed by: INTERNAL MEDICINE

## 2022-03-18 PROCEDURE — G8484 FLU IMMUNIZE NO ADMIN: HCPCS | Performed by: INTERNAL MEDICINE

## 2022-03-18 PROCEDURE — 93000 ELECTROCARDIOGRAM COMPLETE: CPT | Performed by: INTERNAL MEDICINE

## 2022-03-18 PROCEDURE — 4040F PNEUMOC VAC/ADMIN/RCVD: CPT | Performed by: INTERNAL MEDICINE

## 2022-03-18 RX ORDER — ATORVASTATIN CALCIUM 20 MG/1
20 TABLET, FILM COATED ORAL DAILY
Qty: 90 TABLET | Refills: 3 | Status: SHIPPED | OUTPATIENT
Start: 2022-03-18 | End: 2022-08-30

## 2022-03-18 RX ORDER — POTASSIUM CHLORIDE 20 MEQ/1
20 TABLET, EXTENDED RELEASE ORAL DAILY
Qty: 90 TABLET | Refills: 3 | Status: SHIPPED | OUTPATIENT
Start: 2022-03-18

## 2022-03-18 RX ORDER — AMIODARONE HYDROCHLORIDE 200 MG/1
100 TABLET ORAL DAILY
Qty: 45 TABLET | Refills: 3 | Status: SHIPPED | OUTPATIENT
Start: 2022-03-18

## 2022-03-18 NOTE — PATIENT INSTRUCTIONS
a pneumococcal vaccine shot. If you have had one before, ask your doctor whether you need another dose. Get a flu shot every year. If you must be around people with colds or flu, wash your hands often. Activity    · If your doctor recommends it, get more exercise. Walking is a good choice. Bit by bit, increase the amount you walk every day. Try for at least 30 minutes on most days of the week. You also may want to swim, bike, or do other activities. Your doctor may suggest that you join a cardiac rehabilitation program so that you can have help increasing your physical activity safely.     · Start light exercise if your doctor says it is okay. Even a small amount will help you get stronger, have more energy, and manage stress. Walking is an easy way to get exercise. Start out by walking a little more than you did in the hospital. Gradually increase the amount you walk.     · When you exercise, watch for signs that your heart is working too hard. You are pushing too hard if you cannot talk while you are exercising. If you become short of breath or dizzy or have chest pain, sit down and rest immediately.     · Check your pulse regularly. Place two fingers on the artery at the palm side of your wrist, in line with your thumb. If your heartbeat seems uneven or fast, talk to your doctor. When should you call for help? Call 911 anytime you think you may need emergency care. For example, call if:    · You have symptoms of a heart attack. These may include:  ? Chest pain or pressure, or a strange feeling in the chest.  ? Sweating. ? Shortness of breath. ? Nausea or vomiting. ? Pain, pressure, or a strange feeling in the back, neck, jaw, or upper belly or in one or both shoulders or arms. ? Lightheadedness or sudden weakness. ? A fast or irregular heartbeat. After you call 911, the  may tell you to chew 1 adult-strength or 2 to 4 low-dose aspirin. Wait for an ambulance.  Do not try to drive yourself.     · You have symptoms of a stroke. These may include:  ? Sudden numbness, tingling, weakness, or loss of movement in your face, arm, or leg, especially on only one side of your body. ? Sudden vision changes. ? Sudden trouble speaking. ? Sudden confusion or trouble understanding simple statements. ? Sudden problems with walking or balance. ? A sudden, severe headache that is different from past headaches.     · You passed out (lost consciousness). Call your doctor now or seek immediate medical care if:    · You have new or increased shortness of breath.     · You feel dizzy or lightheaded, or you feel like you may faint.     · Your heart rate becomes irregular.     · You can feel your heart flutter in your chest or skip heartbeats. Tell your doctor if these symptoms are new or worse. Watch closely for changes in your health, and be sure to contact your doctor if you have any problems. Where can you learn more? Go to [email or web address]://[email or web address].[employer]. org and sign in to your [email or web address] account. Enter U020 in the [employer] box to learn more about \"Atrial Fibrillation: Care Instructions. \"     If you do not have an account, please click on the \"Sign Up Now\" link. Current as of: April 29, 2021               Content Version: 13.1  © 5025-9552 [employer]. Care instructions adapted under license by [facility]. If you have questions about a medical condition or this instruction, always ask your healthcare professional. [place] [address] any warranty or liability for your use of this information.

## 2022-03-21 RX ORDER — FUROSEMIDE 40 MG/1
TABLET ORAL
Qty: 180 TABLET | Refills: 1 | OUTPATIENT
Start: 2022-03-21

## 2022-03-21 NOTE — TELEPHONE ENCOUNTER
Spoke with the patient and she said  she have enough medication  does not  need refill on the medication , patient have upcoming appointment on  03/30/2022 . Patient said she will have her blood work done on 03/26/2022 .

## 2022-03-26 DIAGNOSIS — I48.91 ATRIAL FIBRILLATION WITH RVR (HCC): ICD-10-CM

## 2022-03-26 DIAGNOSIS — Z51.81 MEDICATION MONITORING ENCOUNTER: ICD-10-CM

## 2022-03-26 DIAGNOSIS — R73.03 PREDIABETES: ICD-10-CM

## 2022-03-26 DIAGNOSIS — I10 ESSENTIAL HYPERTENSION: ICD-10-CM

## 2022-03-26 DIAGNOSIS — E78.2 MIXED HYPERLIPIDEMIA: ICD-10-CM

## 2022-03-26 LAB
A/G RATIO: 2 (ref 1.1–2.2)
ALBUMIN SERPL-MCNC: 4.1 G/DL (ref 3.4–5)
ALP BLD-CCNC: 60 U/L (ref 40–129)
ALT SERPL-CCNC: 16 U/L (ref 10–40)
ANION GAP SERPL CALCULATED.3IONS-SCNC: 13 MMOL/L (ref 3–16)
AST SERPL-CCNC: 18 U/L (ref 15–37)
BASOPHILS ABSOLUTE: 0.1 K/UL (ref 0–0.2)
BASOPHILS RELATIVE PERCENT: 1.2 %
BILIRUB SERPL-MCNC: 0.4 MG/DL (ref 0–1)
BUN BLDV-MCNC: 32 MG/DL (ref 7–20)
CALCIUM SERPL-MCNC: 9.1 MG/DL (ref 8.3–10.6)
CHLORIDE BLD-SCNC: 101 MMOL/L (ref 99–110)
CHOLESTEROL, TOTAL: 173 MG/DL (ref 0–199)
CO2: 26 MMOL/L (ref 21–32)
CREAT SERPL-MCNC: 1 MG/DL (ref 0.6–1.2)
EOSINOPHILS ABSOLUTE: 0.2 K/UL (ref 0–0.6)
EOSINOPHILS RELATIVE PERCENT: 4.2 %
GFR AFRICAN AMERICAN: >60
GFR NON-AFRICAN AMERICAN: 53
GLUCOSE BLD-MCNC: 84 MG/DL (ref 70–99)
HCT VFR BLD CALC: 35 % (ref 36–48)
HDLC SERPL-MCNC: 60 MG/DL (ref 40–60)
HEMOGLOBIN: 11.7 G/DL (ref 12–16)
LDL CHOLESTEROL CALCULATED: 97 MG/DL
LYMPHOCYTES ABSOLUTE: 1.4 K/UL (ref 1–5.1)
LYMPHOCYTES RELATIVE PERCENT: 28.1 %
MCH RBC QN AUTO: 30.5 PG (ref 26–34)
MCHC RBC AUTO-ENTMCNC: 33.5 G/DL (ref 31–36)
MCV RBC AUTO: 91.2 FL (ref 80–100)
MONOCYTES ABSOLUTE: 0.4 K/UL (ref 0–1.3)
MONOCYTES RELATIVE PERCENT: 7.8 %
NEUTROPHILS ABSOLUTE: 2.9 K/UL (ref 1.7–7.7)
NEUTROPHILS RELATIVE PERCENT: 58.7 %
PDW BLD-RTO: 14.7 % (ref 12.4–15.4)
PLATELET # BLD: 114 K/UL (ref 135–450)
PMV BLD AUTO: 11.9 FL (ref 5–10.5)
POTASSIUM SERPL-SCNC: 4.4 MMOL/L (ref 3.5–5.1)
RBC # BLD: 3.84 M/UL (ref 4–5.2)
SODIUM BLD-SCNC: 140 MMOL/L (ref 136–145)
TOTAL PROTEIN: 6.2 G/DL (ref 6.4–8.2)
TRIGL SERPL-MCNC: 82 MG/DL (ref 0–150)
TSH SERPL DL<=0.05 MIU/L-ACNC: 2.27 UIU/ML (ref 0.27–4.2)
VLDLC SERPL CALC-MCNC: 16 MG/DL
WBC # BLD: 4.9 K/UL (ref 4–11)

## 2022-03-27 LAB
ESTIMATED AVERAGE GLUCOSE: 119.8 MG/DL
HBA1C MFR BLD: 5.8 %

## 2022-03-30 ENCOUNTER — OFFICE VISIT (OUTPATIENT)
Dept: FAMILY MEDICINE CLINIC | Age: 82
End: 2022-03-30
Payer: MEDICARE

## 2022-03-30 VITALS
SYSTOLIC BLOOD PRESSURE: 121 MMHG | DIASTOLIC BLOOD PRESSURE: 76 MMHG | OXYGEN SATURATION: 98 % | WEIGHT: 148.8 LBS | HEIGHT: 63 IN | HEART RATE: 91 BPM | BODY MASS INDEX: 26.36 KG/M2 | TEMPERATURE: 98.2 F

## 2022-03-30 DIAGNOSIS — Z00.00 MEDICARE ANNUAL WELLNESS VISIT, SUBSEQUENT: Primary | ICD-10-CM

## 2022-03-30 DIAGNOSIS — I48.0 PAF (PAROXYSMAL ATRIAL FIBRILLATION) (HCC): ICD-10-CM

## 2022-03-30 DIAGNOSIS — I63.30 CEREBROVASCULAR ACCIDENT (CVA) DUE TO THROMBOSIS OF CEREBRAL ARTERY (HCC): ICD-10-CM

## 2022-03-30 DIAGNOSIS — R73.03 PREDIABETES: ICD-10-CM

## 2022-03-30 DIAGNOSIS — I10 ESSENTIAL HYPERTENSION: ICD-10-CM

## 2022-03-30 DIAGNOSIS — E78.2 MIXED HYPERLIPIDEMIA: ICD-10-CM

## 2022-03-30 DIAGNOSIS — I50.32 CHRONIC DIASTOLIC (CONGESTIVE) HEART FAILURE (HCC): ICD-10-CM

## 2022-03-30 PROCEDURE — 1123F ACP DISCUSS/DSCN MKR DOCD: CPT | Performed by: INTERNAL MEDICINE

## 2022-03-30 PROCEDURE — G0439 PPPS, SUBSEQ VISIT: HCPCS | Performed by: INTERNAL MEDICINE

## 2022-03-30 PROCEDURE — 4040F PNEUMOC VAC/ADMIN/RCVD: CPT | Performed by: INTERNAL MEDICINE

## 2022-03-30 PROCEDURE — G8484 FLU IMMUNIZE NO ADMIN: HCPCS | Performed by: INTERNAL MEDICINE

## 2022-03-30 ASSESSMENT — PATIENT HEALTH QUESTIONNAIRE - PHQ9
2. FEELING DOWN, DEPRESSED OR HOPELESS: 0
SUM OF ALL RESPONSES TO PHQ QUESTIONS 1-9: 0
SUM OF ALL RESPONSES TO PHQ9 QUESTIONS 1 & 2: 0
SUM OF ALL RESPONSES TO PHQ QUESTIONS 1-9: 0
1. LITTLE INTEREST OR PLEASURE IN DOING THINGS: 0

## 2022-03-30 ASSESSMENT — ENCOUNTER SYMPTOMS
TROUBLE SWALLOWING: 0
BLOOD IN STOOL: 0
COUGH: 0
SHORTNESS OF BREATH: 0
VOMITING: 0
BACK PAIN: 0
NAUSEA: 0
ABDOMINAL PAIN: 0
DIARRHEA: 0
CONSTIPATION: 0
ROS SKIN COMMENTS: NO CONCERNING SKIN LESIONS
SINUS PRESSURE: 0

## 2022-03-30 ASSESSMENT — LIFESTYLE VARIABLES: HOW OFTEN DO YOU HAVE A DRINK CONTAINING ALCOHOL: NEVER

## 2022-03-30 NOTE — PROGRESS NOTES
SUBJECTIVE:  Constanza Estrada is a 80 y.o. female being evaluated for:    Chief Complaint   Patient presents with   [facility]      Patient is here for medicarewellness today . HPI   Patient remains active  Working 11 hour days at [employer] 5 days a week  wethout any problems  NO fast heart beats or palpitaions No stroke like symptms     Allergies   Allergen Reactions    Lisinopril      cough     Current Outpatient Medications   Medication Sig Dispense Refill    potassium chloride (KLOR-CON M) 20 MEQ extended release tablet Take 1 tablet by mouth daily 90 tablet 3    amiodarone (CORDARONE) 200 MG tablet Take 0.5 tablets by mouth daily 45 tablet 3    atorvastatin (LIPITOR) 20 MG tablet Take 1 tablet by mouth daily 90 tablet 3    metoprolol tartrate (LOPRESSOR) 25 MG tablet Take 1 tablet by mouth 2 times daily 180 tablet 3    atorvastatin (LIPITOR) 20 MG tablet TAKE 1 TABLET BY MOUTH DAILY 90 tablet 0    apixaban (ELIQUIS) 5 MG TABS tablet TAKE 1 TABLET BY MOUTH TWICE DAILY 60 tablet 5    furosemide (LASIX) 40 MG tablet TAKE 1 TABLET BY MOUTH DAILY. TAKE 1 EXTRA TABLET AS NEEDED IF WEIGHT GOES UP 2  tablet 1    Omega-3 Fatty Acids (FISH OIL) 1000 MG CAPS Take 3,000 mg by mouth daily      Coenzyme Q10 (COQ10) 100 MG CAPS Take 1 tablet by mouth daily       calcium-vitamin D (OSCAL) 250-125 MG-UNIT per tablet Take 1 tablet by mouth 2 times daily.  multivitamin (THERAGRAN) per tablet Take 1 tablet by mouth daily. No current facility-administered medications for this visit. Social History     Socioeconomic History    Marital status:       Spouse name: Not on file    Number of children: Not on file    Years of education: Not on file    Highest education level: Not on file   Occupational History    Not on file   Tobacco Use    Smoking status: Never Smoker    Smokeless tobacco: Never Used   Vaping Use    Vaping Use: Never used   Substance and Sexual Activity    Alcohol use: Not Currently    Drug use: No    Sexual activity: Not Currently     Partners: Male   Other Topics Concern    Not on file   Social History Narrative    Not on file     Social Determinants of Health     Financial Resource Strain: Low Risk     Difficulty of Paying Living Expenses: Not hard at all   Food Insecurity: No Food Insecurity    Worried About Running Out of Food in the Last Year: Never true    Jud of Food in the Last Year: Never true   Transportation Needs: No Transportation Needs    Lack of Transportation (Medical): No    Lack of Transportation (Non-Medical):  No   Physical Activity: Unknown    Days of Exercise per Week: 0 days    Minutes of Exercise per Session: Not on file   Stress:     Feeling of Stress : Not on file   Social Connections:     Frequency of Communication with Friends and Family: Not on file    Frequency of Social Gatherings with Friends and Family: Not on file    Attends [employer] Services: Not on file    Active Member of [address] [place] [address] or Organizations: Not on file    Attends Club or Organization Meetings: Not on file    Marital Status: Not on file   Intimate Partner Violence:     Fear of Current or Ex-Partner: Not on file    Emotionally Abused: Not on file    Physically Abused: Not on file    Sexually Abused: Not on file   Housing Stability:     Unable to Pay for Housing in the Last Year: Not on file    Number of [place] in the Last Year: Not on file    Unstable Housing in the Last Year: Not on file      Past Medical History:   Diagnosis Date    Atrial fibrillation ([facility] [address]     Bleeding ulcer 01/2019    Fibromyalgia     History of blood transfusion 01/2019    2 units    Hyperlipidemia     Hypertension     Osteoporosis     RLS (restless legs syndrome)     Shingles     Stroke ([facility] [address] 2004    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      Past Surgical History:   Procedure Laterality Date    BREAST SURGERY Left     lumpectomy    CARDIOVERSION      [facility]     COLONOSCOPY N/A 6/18/2019    COLONOSCOPY POLYPECTOMY COLD BIOPSY performed by Jose Mendoza MD at [address]  9/07    scalp cyst excision    CYST REMOVAL      from left breast    HYSTERECTOMY, TOTAL ABDOMINAL      with bso     OVARIAN CYST REMOVAL Right     Age 24    UPPER GASTROINTESTINAL ENDOSCOPY N/A 1/16/2019    EGD ESOPHAGOGASTRODUODENOSCOPY performed by Jose [facility]za MD at [facility] N/A 6/18/2019    EGD BIOPSY performed by Jose Mendoza MD at [address] History   Problem Relation Age of Onset    Heart Disease Mother         CAD    Diabetes Mother     Other Mother         fall    Kidney Disease Mother     Diabetes Maternal Grandmother     High Blood Pressure Maternal Grandfather     No Known Problems Paternal Grandmother     No Known Problems Paternal Grandfather     Diabetes Maternal Aunt      Review of Systems   Constitutional: Negative for activity change, fatigue and unexpected weight change. HENT: Negative for congestion, nosebleeds, sinus pressure and trouble swallowing. Eyes: Negative for visual disturbance. Respiratory: Negative for cough and shortness of breath. Cardiovascular: Negative for chest pain, palpitations and leg swelling. Gastrointestinal: Negative for abdominal pain, blood in stool, constipation, diarrhea, nausea and vomiting. Endocrine: Negative for cold intolerance and heat intolerance. Self breast exams negative    Genitourinary: Negative for dysuria and vaginal bleeding. Musculoskeletal: Negative for arthralgias and back pain. Skin:        No concerning skin lesions    Neurological: Negative for dizziness, syncope, speech difficulty, weakness, light-headedness, numbness and headaches. Hematological: Does not bruise/bleed easily. Psychiatric/Behavioral: Positive for sleep disturbance (reading the bible ). Negative for dysphoric mood. OBJECTIVE:  /76 (Site: Left Upper Arm, Position: Sitting, Cuff Size: Medium Adult)   Pulse 91   Temp 98.2 °F (36.8 °C) (Oral)   Ht 5' 3\" (1.6 m)   Wt 148 lb 12.8 oz (67.5 kg)   LMP  (LMP Unknown)   SpO2 98%   BMI 26.36 kg/m²      Body mass index is 26.36 kg/m². Physical Exam  Constitutional:       General: She is not in acute distress. Appearance: Normal appearance. She is well-developed. HENT:      Head: Normocephalic and atraumatic. Right Ear: Tympanic membrane and ear canal normal.      Left Ear: Tympanic membrane and ear canal normal.      Nose: Nose normal.      Mouth/Throat:      Pharynx: Oropharynx is clear. Eyes:      Conjunctiva/sclera: Conjunctivae normal.   Neck:      Thyroid: No thyromegaly. Vascular: No carotid bruit or JVD. Cardiovascular:      Rate and Rhythm: Normal rate and regular rhythm. Heart sounds: Normal heart sounds. No murmur heard. No friction rub. No gallop. Comments: Varicose veins on her left thigh   Pulmonary:      Effort: Pulmonary effort is normal.      Breath sounds: Normal breath sounds. Chest:      Chest wall: No tenderness. Abdominal:      General: There is no distension. Palpations: Abdomen is soft. Tenderness: There is no abdominal tenderness. Comments: No hepatosplenomegaly   Musculoskeletal:         General: No swelling. Cervical back: Neck supple. Lymphadenopathy:      Cervical: No cervical adenopathy. Skin:     General: Skin is warm and dry. Findings: No erythema. Neurological:      General: No focal deficit present. Mental Status: She is alert. Gait: Gait normal.      Comments: Memory testing normal   Psychiatric:         Mood and Affect: Mood normal.         Behavior: Behavior normal.         Thought Content: Thought content normal.         ASSESSMENT/PLAN:    Harrison Toribio was seen today for medicare awv.     Diagnoses and all orders for this visit:    Medicare annual wellness visit, subsequent reviewed medicare wellness forms      Chronic diastolic (congestive) heart failure (HCC) no signs      PAF (paroxysmal atrial fibrillation) (HCC) on noac, in sinus on amiodarone      Essential hypertension controlled    Mixed hyperlipidemia good chol profile     Prediabetes long range sugar 5.8    Cerebrovascular accident (CVA) due to thrombosis of cerebral artery ([facility] [address]        No orders of the defined types were placed in this encounter. Return in 6 months (on 9/30/2022), or if symptoms worsen or fail to improve, for Medicare Annual Wellness Visit in 1 year. Patient Instructions     Personalized Preventive Plan for Berenice Manzanares - 3/30/2022  Medicare offers a range of preventive health benefits. Some of the tests and screenings are paid in full while other may be subject to a deductible, co-insurance, and/or copay. Some of these benefits include a comprehensive review of your medical history including lifestyle, illnesses that may run in your family, and various assessments and screenings as appropriate. After reviewing your medical record and screening and assessments performed today your provider may have ordered immunizations, labs, imaging, and/or referrals for you. A list of these orders (if applicable) as well as your Preventive Care list are included within your After Visit Summary for your review. Other Preventive Recommendations:    · A preventive eye exam performed by an eye specialist is recommended every 1-2 years to screen for glaucoma; cataracts, macular degeneration, and other eye disorders. · A preventive dental visit is recommended every 6 months. · Try to get at least 150 minutes of exercise per week or 10,000 steps per day on a pedometer . · Order or download the FREE \"Exercise & Physical Activity: Your Everyday Guide\" from [employer]. Call 6-459.782.3670 or search [employer] online.   · You need 7875-5938 mg of calcium and 6857-3000 IU of vitamin D per day. It is possible to meet your calcium requirement with diet alone, but a vitamin D supplement is usually necessary to meet this goal.  · When exposed to the sun, use a sunscreen that protects against both UVA and UVB radiation with an SPF of 30 or greater. Reapply every 2 to 3 hours or after sweating, drying off with a towel, or swimming. · Always wear a seat belt when traveling in a car. Always wear a helmet when riding a bicycle or motorcycle. Medicare Annual Wellness Visit    Jovita Ramirez is here for Medicare AWV ( [facility] )    Assessment & Plan   Medicare annual wellness visit, subsequent  Chronic diastolic (congestive) heart failure (HCC)  PAF (paroxysmal atrial fibrillation) ([facility] [address]  Essential hypertension  Mixed hyperlipidemia  Prediabetes  Cerebrovascular accident (CVA) due to thrombosis of cerebral artery ([facility] [address]      Recommendations for Preventive Services Due: see orders and patient instructions/AVS.  Recommended screening schedule for the next 5-10 years is provided to the patient in written form: see Patient Instructions/AVS.     Return in 6 months (on 9/30/2022), or if symptoms worsen or fail to improve, for Medicare Annual Wellness Visit in 1 year. Subjective     Patient's complete Health Risk Assessment and screening values have been reviewed and are found in Flowsheets. The following problems were reviewed today and where indicated follow up appointments were made and/or referrals ordered.     Positive Risk Factor Screenings with Interventions:               General Health and ACP:  General  In general, how would you say your health is?: Very Good  In the past 7 days, have you experienced any of the following: New or Increased Pain, New or Increased Fatigue, Loneliness, Social Isolation, Stress or Anger?: No  Do you get the social and emotional support that you need?: Yes  Do you have a Living Will?: (!) mass index is 26.36 kg/m². See above physical and ROS        Allergies   Allergen Reactions    Lisinopril      cough     Prior to Visit Medications    Medication Sig Taking? Authorizing Provider   potassium chloride (KLOR-CON M) 20 MEQ extended release tablet Take 1 tablet by mouth daily Yes Edyta Gaviria MD   amiodarone (CORDARONE) 200 MG tablet Take 0.5 tablets by mouth daily Yes Edyta Gaviria MD   atorvastatin (LIPITOR) 20 MG tablet Take 1 tablet by mouth daily Yes Edyta Gaviria MD   metoprolol tartrate (LOPRESSOR) 25 MG tablet Take 1 tablet by mouth 2 times daily Yes Edyta Gaviria MD   atorvastatin (LIPITOR) 20 MG tablet TAKE 1 TABLET BY MOUTH DAILY Yes Guera Benjamin MD   apixaban (ELIQUIS) 5 MG TABS tablet TAKE 1 TABLET BY MOUTH TWICE DAILY Yes ZOILA Aranda - GREG   furosemide (LASIX) 40 MG tablet TAKE 1 TABLET BY MOUTH DAILY. TAKE 1 EXTRA TABLET AS NEEDED IF WEIGHT GOES UP 2 LB Yes Guera Benjamin MD   Omega-3 Fatty Acids (FISH OIL) 1000 MG CAPS Take 3,000 mg by mouth daily Yes Historical Provider, MD   Coenzyme Q10 (COQ10) 100 MG CAPS Take 1 tablet by mouth daily  Yes Historical Provider, MD   calcium-vitamin D (OSCAL) 250-125 MG-UNIT per tablet Take 1 tablet by mouth 2 times daily. Yes Historical Provider, MD   multivitamin [facility]) per tablet Take 1 tablet by mouth daily.    Yes Historical Provider, MD Castro (Including outside providers/suppliers regularly involved in providing care):   Patient Care Team:  Guera Benjamin MD as PCP - General (Internal Medicine)  Guera Benjamin MD as PCP - [facility] Empaneled Provider   Dr Amber Barroso cardiology     Reviewed and updated this visit:  Tobacco  Allergies  Meds  Med Hx  Surg Hx  Soc Hx  Fam Hx

## 2022-03-30 NOTE — PATIENT INSTRUCTIONS
Personalized Preventive Plan for Rosalia Garsia - 3/30/2022  Medicare offers a range of preventive health benefits. Some of the tests and screenings are paid in full while other may be subject to a deductible, co-insurance, and/or copay. Some of these benefits include a comprehensive review of your medical history including lifestyle, illnesses that may run in your family, and various assessments and screenings as appropriate. After reviewing your medical record and screening and assessments performed today your provider may have ordered immunizations, labs, imaging, and/or referrals for you. A list of these orders (if applicable) as well as your Preventive Care list are included within your After Visit Summary for your review. Other Preventive Recommendations:    · A preventive eye exam performed by an eye specialist is recommended every 1-2 years to screen for glaucoma; cataracts, macular degeneration, and other eye disorders. · A preventive dental visit is recommended every 6 months. · Try to get at least 150 minutes of exercise per week or 10,000 steps per day on a pedometer . · Order or download the FREE \"Exercise & Physical Activity: Your Everyday Guide\" from [employer]. Call 6-558.224.9211 or search [employer] online. · You need 4727-5178 mg of calcium and 8068-1420 IU of vitamin D per day. It is possible to meet your calcium requirement with diet alone, but a vitamin D supplement is usually necessary to meet this goal.  · When exposed to the sun, use a sunscreen that protects against both UVA and UVB radiation with an SPF of 30 or greater. Reapply every 2 to 3 hours or after sweating, drying off with a towel, or swimming. · Always wear a seat belt when traveling in a car. Always wear a helmet when riding a bicycle or motorcycle.

## 2022-04-12 ENCOUNTER — NURSE ONLY (OUTPATIENT)
Dept: FAMILY MEDICINE CLINIC | Age: 82
End: 2022-04-12
Payer: MEDICARE

## 2022-04-12 DIAGNOSIS — Z00.00 ROUTINE MEDICAL EXAM: Primary | ICD-10-CM

## 2022-04-12 LAB
HEMOCCULT STL QL: NEGATIVE
HEMOCCULT STL QL: NORMAL
HEMOCCULT STL QL: NORMAL

## 2022-04-12 PROCEDURE — 82270 OCCULT BLOOD FECES: CPT | Performed by: INTERNAL MEDICINE

## 2022-05-16 ENCOUNTER — HOSPITAL ENCOUNTER (OUTPATIENT)
Dept: PULMONOLOGY | Age: 82
Discharge: HOME | End: 2022-05-16
Payer: MEDICARE

## 2022-05-16 DIAGNOSIS — Z79.899 LONG TERM CURRENT USE OF AMIODARONE: ICD-10-CM

## 2022-05-16 DIAGNOSIS — I48.0 PAF (PAROXYSMAL ATRIAL FIBRILLATION) (HCC): ICD-10-CM

## 2022-05-16 LAB
DLCO %PRED: 112 %
DLCO PRED: NORMAL
DLCO/VA %PRED: 116 %
DLCO/VA PRED: NORMAL
DLCO/VA: 5.11 ML/MIN/MMHG
DLCO: 22.69 ML/MIN/MMHG
EXPIRATORY TIME-POST: NORMAL
EXPIRATORY TIME: NORMAL
FEF 25-75% %CHNG: NORMAL
FEF 25-75% %PRED-POST: NORMAL
FEF 25-75% %PRED-PRE: NORMAL
FEF 25-75% PRED: NORMAL
FEF 25-75%-POST: NORMAL
FEF 25-75%-PRE: NORMAL
FEV1 %PRED-POST: NORMAL %
FEV1 %PRED-PRE: 111 %
FEV1 PRED: NORMAL
FEV1-POST: NORMAL
FEV1-PRE: NORMAL
FEV1/FVC %PRED-POST: NORMAL
FEV1/FVC %PRED-PRE: NORMAL
FEV1/FVC PRED: NORMAL
FEV1/FVC-POST: NORMAL %
FEV1/FVC-PRE: 83 %
FVC %PRED-POST: NORMAL
FVC %PRED-PRE: NORMAL
FVC PRED: NORMAL
FVC-POST: NORMAL
FVC-PRE: NORMAL
GAW %PRED: NORMAL
GAW PRED: NORMAL
GAW: NORMAL
IC %PRED: NORMAL
IC PRED: NORMAL
IC: NORMAL
MEP: NORMAL
MIP: NORMAL
MVV %PRED-PRE: NORMAL
MVV PRED: NORMAL
MVV-PRE: NORMAL
PEF %PRED-POST: NORMAL
PEF %PRED-PRE: NORMAL
PEF PRED: NORMAL
PEF%CHNG: NORMAL
PEF-POST: NORMAL
PEF-PRE: NORMAL
RAW %PRED: NORMAL
RAW PRED: NORMAL
RAW: NORMAL
RV %PRED: NORMAL
RV PRED: NORMAL
RV: NORMAL
SVC %PRED: NORMAL
SVC PRED: NORMAL
SVC: NORMAL
TLC %PRED: 95 %
TLC PRED: NORMAL
TLC: NORMAL
VA %PRED: 96 %
VA PRED: NORMAL
VA: 4.44 L
VTG %PRED: NORMAL
VTG PRED: NORMAL
VTG: NORMAL

## 2022-05-16 PROCEDURE — 94729 DIFFUSING CAPACITY: CPT | Performed by: INTERNAL MEDICINE

## 2022-05-16 PROCEDURE — 94726 PLETHYSMOGRAPHY LUNG VOLUMES: CPT | Performed by: INTERNAL MEDICINE

## 2022-05-16 PROCEDURE — 94726 PLETHYSMOGRAPHY LUNG VOLUMES: CPT

## 2022-05-16 PROCEDURE — 94729 DIFFUSING CAPACITY: CPT

## 2022-05-16 PROCEDURE — 94010 BREATHING CAPACITY TEST: CPT | Performed by: INTERNAL MEDICINE

## 2022-05-16 PROCEDURE — 6370000000 HC RX 637 (ALT 250 FOR IP): Performed by: INTERNAL MEDICINE

## 2022-05-16 PROCEDURE — 94010 BREATHING CAPACITY TEST: CPT

## 2022-05-16 RX ORDER — ALBUTEROL SULFATE 90 UG/1
4 AEROSOL, METERED RESPIRATORY (INHALATION) ONCE
Status: DISCONTINUED | OUTPATIENT
Start: 2022-05-16 | End: 2022-05-16

## 2022-05-16 ASSESSMENT — PULMONARY FUNCTION TESTS
FEV1/FVC_PRE: 83
FEV1_PERCENT_PREDICTED_PRE: 111

## 2022-05-16 NOTE — PROCEDURES
spirometry was acceptable and reproducible by ATS standards      Spirometry/Flow volume loop:  No active airflow obstruction    Lung volumes:  Within normal    Diffusing capacity:  Normal diffusion capacity    Impression:  Normal PFT    FEV1 %Pred-Post   Date Value Ref Range Status   05/16/2022 see comment % Final     Comment:     Pt refused Post bronchodilator     FEV1/FVC-Post   Date Value Ref Range Status   05/16/2022 see comment % Final     TLC %Pred   Date Value Ref Range Status   05/16/2022 95 % Final     DLCO/VA %Pred   Date Value Ref Range Status   05/16/2022 116 % Final           OBSTRUCTION % Predicted FEV1   MILD >70%   MODERATE 60-69%   MODERATELY-SEVERE 50-59%   SEVERE 35-49%   VERY SEVERE <35%         RESTRICTION % Predicted TLC   MILD 66-80%   MODERATE 54-65%   MODERATELY-SEVERE <54%                 DIFFUSION CAPACITY DLCO % Pred   MILD >60% AND < LLN   MODERATE 40-60%   SEVERE <40%       PFT data will be scanned into the media tab under this encounter. Please see the scanned data for numerical values.      Amy Dutta MD  [facility]

## 2022-06-13 ENCOUNTER — OFFICE VISIT (OUTPATIENT)
Dept: FAMILY MEDICINE CLINIC | Age: 82
End: 2022-06-13
Payer: MEDICARE

## 2022-06-13 VITALS
OXYGEN SATURATION: 97 % | WEIGHT: 146.6 LBS | HEIGHT: 63 IN | TEMPERATURE: 98.6 F | DIASTOLIC BLOOD PRESSURE: 70 MMHG | HEART RATE: 66 BPM | SYSTOLIC BLOOD PRESSURE: 122 MMHG | BODY MASS INDEX: 25.98 KG/M2

## 2022-06-13 DIAGNOSIS — N30.00 ACUTE CYSTITIS WITHOUT HEMATURIA: Primary | ICD-10-CM

## 2022-06-13 DIAGNOSIS — K40.30 INGUINAL HERNIA OF LEFT SIDE WITH OBSTRUCTION: ICD-10-CM

## 2022-06-13 LAB
BILIRUBIN, POC: NORMAL
BLOOD URINE, POC: NORMAL
CLARITY, POC: CLEAR
COLOR, POC: YELLOW
GLUCOSE URINE, POC: NORMAL
KETONES, POC: NORMAL
LEUKOCYTE EST, POC: NORMAL
NITRITE, POC: NORMAL
PH, POC: 6.5
PROTEIN, POC: NORMAL
SPECIFIC GRAVITY, POC: 1.01
UROBILINOGEN, POC: 0.2

## 2022-06-13 PROCEDURE — G8427 DOCREV CUR MEDS BY ELIG CLIN: HCPCS | Performed by: NURSE PRACTITIONER

## 2022-06-13 PROCEDURE — 1123F ACP DISCUSS/DSCN MKR DOCD: CPT | Performed by: NURSE PRACTITIONER

## 2022-06-13 PROCEDURE — 81002 URINALYSIS NONAUTO W/O SCOPE: CPT | Performed by: NURSE PRACTITIONER

## 2022-06-13 PROCEDURE — 1090F PRES/ABSN URINE INCON ASSESS: CPT | Performed by: NURSE PRACTITIONER

## 2022-06-13 PROCEDURE — G8417 CALC BMI ABV UP PARAM F/U: HCPCS | Performed by: NURSE PRACTITIONER

## 2022-06-13 PROCEDURE — 1036F TOBACCO NON-USER: CPT | Performed by: NURSE PRACTITIONER

## 2022-06-13 PROCEDURE — 99214 OFFICE O/P EST MOD 30 MIN: CPT | Performed by: NURSE PRACTITIONER

## 2022-06-13 PROCEDURE — G8399 PT W/DXA RESULTS DOCUMENT: HCPCS | Performed by: NURSE PRACTITIONER

## 2022-06-13 RX ORDER — NITROFURANTOIN 25; 75 MG/1; MG/1
100 CAPSULE ORAL 2 TIMES DAILY
Qty: 20 CAPSULE | Refills: 0 | Status: SHIPPED | OUTPATIENT
Start: 2022-06-13 | End: 2022-06-23

## 2022-06-13 NOTE — PATIENT INSTRUCTIONS
Patient Education        Inguinal Hernia: Care Instructions  Your Care Instructions     An inguinal hernia occurs when tissue bulges through a weak spot in your groin area. You may see or feel a tender bulge in the groin or scrotum. You may alsohave pain, pressure or burning, or a feeling that something has \"given way. \"  Hernias are caused by a weakness in the belly wall. The bulge or discomfort may occur after heavy lifting, straining, or coughing. Hernias do not heal on theirown, and they tend to get worse over time. If your hernia does not bother you, you most likely can wait to have surgery. Your hernia may get worse, but it may not. In some cases, hernias that aresmall and painless may never need to be repaired. Follow-up care is a key part of your treatment and safety. Be sure to make and go to all appointments, and call your doctor if you are having problems. It's also a good idea to know your test results and keep alist of the medicines you take. How can you care for yourself at home?  Take pain medicines exactly as directed. ? If the doctor gave you a prescription medicine for pain, take it as prescribed. ? If you are not taking a prescription pain medicine, ask your doctor if you can take an over-the-counter medicine.  Use proper lifting techniques, and avoid heavy lifting if you can. To lift things more safely, bend your knees and let your arms and legs do the work. Keep your back straight, and do not bend over at the waist. Keep the load as close to your body as you can. Move your feet instead of turning or twisting your body.  Lose weight if you are overweight.  Include fruits, vegetables, legumes, and whole grains in your diet each day. These foods are high in fiber and will make it easier to avoid straining during bowel movements.  Do not smoke. Smoking can cause coughing, which can cause your hernia to bulge.  If you need help quitting, talk to your doctor about stop-smoking programs and medicines. These can increase your chances of quitting for good. When should you call for help? Call your doctor now or seek immediate medical care if:     You have new or worse belly pain.      You are vomiting.      You cannot pass stools or gas.      You cannot push the hernia back into place with gentle pressure when you are lying down.      The area over the hernia turns red or becomes tender. Watch closely for changes in your health, and be sure to contact your doctor ifyou have any problems. Where can you learn more? Go to https://[employer] ([employer]peArctic Wolf Networks.E-Semble. org and sign in to your Phagenesis account. Enter E598 in the X2TV box to learn more about \"Inguinal Hernia: Care Instructions. \"     If you do not have an account, please click on the \"Sign Up Now\" link. Current as of: September 8, 2021               Content Version: 13.2  © 2006-2022 [employer]. Care instructions adapted under license by [facility] ([facility]). If you have questions about a medical condition or this instruction, always ask your healthcare professional. [place] [address] any warranty or liability for your use of this information. Patient Education        Urinary Tract Infection (UTI) in Women: Care Instructions  Overview     A urinary tract infection, or UTI, is a general term for an infection anywhere between the kidneys and the urethra (where urine comes out). Most UTIs arebladder infections. They often cause pain or burning when you urinate. UTIs are caused by bacteria and can be cured with antibiotics. Be sure tocomplete your treatment so that the infection does not get worse. Follow-up care is a key part of your treatment and safety. Be sure to make and go to all appointments, and call your doctor if you are having problems. It's also a good idea to know your test results and keep alist of the medicines you take. How can you care for yourself at home?    Take your antibiotics as directed. Do not stop taking them just because you feel better. You need to take the full course of antibiotics.  Drink extra water and other fluids for the next day or two. This will help make the urine less concentrated and help wash out the bacteria that are causing the infection. (If you have kidney, heart, or liver disease and have to limit fluids, talk with your doctor before you increase the amount of fluids you drink.)   Avoid drinks that are carbonated or have caffeine. They can irritate the bladder.  Urinate often. Try to empty your bladder each time.  To relieve pain, take a hot bath or lay a heating pad set on low over your lower belly or genital area. Never go to sleep with a heating pad in place. To prevent UTIs   Drink plenty of water each day. This helps you urinate often, which clears bacteria from your system. (If you have kidney, heart, or liver disease and have to limit fluids, talk with your doctor before you increase the amount of fluids you drink.)   Urinate when you need to.  If you are sexually active, urinate right after you have sex.  Change sanitary pads often.  Avoid douches, bubble baths, feminine hygiene sprays, and other feminine hygiene products that have deodorants.  After going to the bathroom, wipe from front to back. When should you call for help? Call your doctor now or seek immediate medical care if:     Symptoms such as fever, chills, nausea, or vomiting get worse or appear for the first time.      You have new pain in your back just below your rib cage. This is called flank pain.      There is new blood or pus in your urine.      You have any problems with your antibiotic medicine. Watch closely for changes in your health, and be sure to contact your doctor if:     You are not getting better after taking an antibiotic for 2 days.      Your symptoms go away but then come back. Where can you learn more?   Go to https://chpepiceweb.BioIQ. org and sign in to your NVoicePay account. Enter O443 in the [place] box to learn more about \"Urinary Tract Infection (UTI) in Women: Care Instructions. \"     If you do not have an account, please click on the \"Sign Up Now\" link. Current as of: October 18, 2021               Content Version: 13.2  © 2006-2022 [employer]. Care instructions adapted under license by [facility] [address] If you have questions about a medical condition or this instruction, always ask your healthcare professional. [place] [address] any warranty or liability for your use of this information. Patient Education        Constipation: Care Instructions  Overview     Constipation means that you have a hard time passing stools (bowel movements). People pass stools from 3 times a day to once every 3 days. What is normal for you may be different. Constipation may occur with pain in the rectum and cramping. The pain may get worse when you try to pass stools. Sometimes there are small amounts of bright red blood on toilet paper or the surface of stools. This is because of enlarged veins near the rectum (hemorrhoids). A few changes in your diet and lifestyle may help you avoid ongoingconstipation. Your doctor may also prescribe medicine to help loosen your stool. Some medicines can cause constipation. These include pain medicines and antidepressants. Tell your doctor about all the medicines you take. Your doctormay want to make a medicine change to ease your symptoms. Follow-up care is a key part of your treatment and safety. Be sure to make and go to all appointments, and call your doctor if you are having problems. It's also a good idea to know your test results and keep alist of the medicines you take. How can you care for yourself at home?  Drink plenty of fluids.  If you have kidney, heart, or liver disease and have to limit fluids, talk with your doctor before you increase the amount of fluids you drink.  Include high-fiber foods in your diet each day. These include fruits, vegetables, beans, and whole grains.  Get at least 30 minutes of exercise on most days of the week. Walking is a good choice. You also may want to do other activities, such as running, swimming, cycling, or playing tennis or team sports.  Take a fiber supplement, such as Citrucel or Metamucil, every day. Read and follow all instructions on the label.  Schedule time each day for a bowel movement. A daily routine may help. Take your time having a bowel movement, but don't sit for more than 10 minutes at a time. And don't strain too much.  Support your feet with a small step stool when you sit on the toilet. This helps flex your hips and places your pelvis in a squatting position.  Your doctor may recommend an over-the-counter laxative to relieve your constipation. Examples are Milk of Magnesia and MiraLax. Read and follow all instructions on the label. Do not use laxatives on a long-term basis. When should you call for help? Call your doctor now or seek immediate medical care if:     You have new or worse belly pain.      You have new or worse nausea or vomiting.      You have blood in your stools. Watch closely for changes in your health, and be sure to contact your doctor if:     Your constipation is getting worse.      You do not get better as expected. Where can you learn more? Go to [email or web address] [email or web address] Just [email or web address]. org and sign in to your [employer] account. Enter [phone] in the [facility] box to learn more about \"Constipation: Care Instructions. \"     If you do not have an account, please click on the \"Sign Up Now\" link. Current as of: July 1, 2021               Content Version: 13.2  © 0278-7785 [employer]. Care instructions adapted under license by [facility] ([facility]).  If you have questions about a medical condition or this instruction, always ask your healthcare professional. [place] [address] any warranty or liability for your use of this information.

## 2022-06-13 NOTE — PROGRESS NOTES
Rl Lares (:  1940) is a 80 y.o. female,Established patient, here for evaluation of the following chief complaint(s):  Pelvic Pain (possible lump ) and Urinary Pain         ASSESSMENT/PLAN:  1. Acute cystitis without hematuria  UA positive for leuk and nitrites. Start macrobid, send urine for culture. Follow pt education and instructions included in avs.  - POCT Urinalysis no Micro    2. Inguinal hernia of left side with obstruction  New, pt state she does not want a referral for surgery at this time. Verbalized she would notify office for worsening symptoms. Follow pt education and instructions included in avs.       Return if symptoms worsen or fail to improve. Subjective   SUBJECTIVE/OBJECTIVE:  HPI  2 weeks ago developed dysuria, pain in suprapubic area. incresed water intake along with cranberry juice, had some relief of urinary symptoms. noticed a lump in her pelvic area, states it is tender when she pushes on it. History of abd hysterectomy, cyst removed from right ovary. Intermittent constipation, chronic. Denies n/v, fevers, flank pain, weight loss. Current Outpatient Medications   Medication Sig Dispense Refill    nitrofurantoin, macrocrystal-monohydrate, (MACROBID) 100 MG capsule Take 1 capsule by mouth 2 times daily for 10 days 20 capsule 0    potassium chloride (KLOR-CON M) 20 MEQ extended release tablet Take 1 tablet by mouth daily 90 tablet 3    amiodarone (CORDARONE) 200 MG tablet Take 0.5 tablets by mouth daily 45 tablet 3    atorvastatin (LIPITOR) 20 MG tablet Take 1 tablet by mouth daily 90 tablet 3    metoprolol tartrate (LOPRESSOR) 25 MG tablet Take 1 tablet by mouth 2 times daily 180 tablet 3    atorvastatin (LIPITOR) 20 MG tablet TAKE 1 TABLET BY MOUTH DAILY 90 tablet 0    apixaban (ELIQUIS) 5 MG TABS tablet TAKE 1 TABLET BY MOUTH TWICE DAILY 60 tablet 5    furosemide (LASIX) 40 MG tablet TAKE 1 TABLET BY MOUTH DAILY.  TAKE 1 EXTRA TABLET AS NEEDED IF WEIGHT GOES UP 2  tablet 1    Omega-3 Fatty Acids (FISH OIL) 1000 MG CAPS Take 3,000 mg by mouth daily      Coenzyme Q10 (COQ10) 100 MG CAPS Take 1 tablet by mouth daily       calcium-vitamin D (OSCAL) 250-125 MG-UNIT per tablet Take 1 tablet by mouth 2 times daily.  multivitamin (THERAGRAN) per tablet Take 1 tablet by mouth daily. No current facility-administered medications for this visit. 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 Respiratory: Negative for cough, shortness of breath and wheezing. Cardiovascular: Negative for chest pain, palpitations and leg swelling. Gastrointestinal: Negative for abdominal pain, diarrhea and nausea. Genitourinary: Positive for dysuria, frequency and pelvic pain (supra pubic). Negative for flank pain and hematuria. Objective   Physical Exam  Constitutional:       General: She is not in acute distress. Appearance: She is not ill-appearing. HENT:      Mouth/Throat:      Mouth: Mucous membranes are moist.      Pharynx: Oropharynx is clear. Eyes:      Extraocular Movements: Extraocular movements intact. Conjunctiva/sclera: Conjunctivae normal.      Pupils: Pupils are equal, round, and reactive to light. Cardiovascular:      Rate and Rhythm: Normal rate and regular rhythm. Heart sounds: Normal heart sounds. Pulmonary:      Effort: Pulmonary effort is normal.   Abdominal:      Tenderness: There is no abdominal tenderness. There is no right CVA tenderness or left CVA tenderness. Hernia: A hernia (left inguinal) is present. Musculoskeletal:      Right lower leg: No edema. Left lower leg: No edema. Skin:     General: Skin is warm. Neurological:      Mental Status: She is alert.    Psychiatric:         Mood and Affect: Mood normal.              --ZOILA Alvarez NP

## 2022-06-14 LAB — URINE CULTURE, ROUTINE: NORMAL

## 2022-06-14 ASSESSMENT — ENCOUNTER SYMPTOMS
ABDOMINAL PAIN: 0
DIARRHEA: 0
COUGH: 0
SHORTNESS OF BREATH: 0
WHEEZING: 0
NAUSEA: 0

## 2022-06-23 ENCOUNTER — TELEPHONE (OUTPATIENT)
Dept: FAMILY MEDICINE CLINIC | Age: 82
End: 2022-06-23

## 2022-06-23 DIAGNOSIS — K40.90 LEFT GROIN HERNIA: Primary | ICD-10-CM

## 2022-06-23 NOTE — TELEPHONE ENCOUNTER
Pt saw Dane Garnica on 6-13 for a UTI and there was also a lump in her pelvic area. Per pt at the appt she declined getting a ultrasound. Pt has decided that she does want to get the ultrasound done. Pt wants to know if Dane Garnica can order this for her? Pl call [phone] when this has been done. Per pt if no answer pl leave a detailed message.

## 2022-07-07 ENCOUNTER — HOSPITAL ENCOUNTER (OUTPATIENT)
Dept: ULTRASOUND IMAGING | Age: 82
Discharge: HOME | End: 2022-07-07
Payer: MEDICARE

## 2022-07-07 DIAGNOSIS — K40.90 LEFT GROIN HERNIA: ICD-10-CM

## 2022-07-07 PROCEDURE — 76705 ECHO EXAM OF ABDOMEN: CPT

## 2022-08-11 RX ORDER — FUROSEMIDE 40 MG/1
TABLET ORAL
Qty: 180 TABLET | Refills: 1 | Status: SHIPPED | OUTPATIENT
Start: 2022-08-11

## 2022-08-30 ENCOUNTER — NURSE TRIAGE (OUTPATIENT)
Dept: OTHER | Facility: CLINIC | Age: 82
End: 2022-08-30

## 2022-08-30 ENCOUNTER — OFFICE VISIT (OUTPATIENT)
Dept: FAMILY MEDICINE CLINIC | Age: 82
End: 2022-08-30
Payer: MEDICARE

## 2022-08-30 VITALS
OXYGEN SATURATION: 98 % | HEART RATE: 65 BPM | BODY MASS INDEX: 25.98 KG/M2 | HEIGHT: 63 IN | WEIGHT: 146.6 LBS | DIASTOLIC BLOOD PRESSURE: 74 MMHG | SYSTOLIC BLOOD PRESSURE: 119 MMHG | TEMPERATURE: 98.8 F

## 2022-08-30 DIAGNOSIS — J40 BRONCHITIS: ICD-10-CM

## 2022-08-30 DIAGNOSIS — J06.9 VIRAL URI: ICD-10-CM

## 2022-08-30 DIAGNOSIS — J06.9 VIRAL URI: Primary | ICD-10-CM

## 2022-08-30 PROCEDURE — 1123F ACP DISCUSS/DSCN MKR DOCD: CPT | Performed by: INTERNAL MEDICINE

## 2022-08-30 PROCEDURE — 1036F TOBACCO NON-USER: CPT | Performed by: INTERNAL MEDICINE

## 2022-08-30 PROCEDURE — G8427 DOCREV CUR MEDS BY ELIG CLIN: HCPCS | Performed by: INTERNAL MEDICINE

## 2022-08-30 PROCEDURE — G8399 PT W/DXA RESULTS DOCUMENT: HCPCS | Performed by: INTERNAL MEDICINE

## 2022-08-30 PROCEDURE — G8417 CALC BMI ABV UP PARAM F/U: HCPCS | Performed by: INTERNAL MEDICINE

## 2022-08-30 PROCEDURE — 99213 OFFICE O/P EST LOW 20 MIN: CPT | Performed by: INTERNAL MEDICINE

## 2022-08-30 PROCEDURE — 1090F PRES/ABSN URINE INCON ASSESS: CPT | Performed by: INTERNAL MEDICINE

## 2022-08-30 RX ORDER — BENZONATATE 100 MG/1
100 CAPSULE ORAL 3 TIMES DAILY PRN
Qty: 30 CAPSULE | Refills: 0 | Status: SHIPPED | OUTPATIENT
Start: 2022-08-30 | End: 2022-09-09

## 2022-08-30 RX ORDER — AZITHROMYCIN 250 MG/1
250 TABLET, FILM COATED ORAL SEE ADMIN INSTRUCTIONS
Qty: 6 TABLET | Refills: 0 | Status: SHIPPED | OUTPATIENT
Start: 2022-08-30 | End: 2022-09-04

## 2022-08-30 RX ORDER — METHYLPREDNISOLONE 4 MG/1
TABLET ORAL
Qty: 1 KIT | Refills: 0 | Status: SHIPPED | OUTPATIENT
Start: 2022-08-30 | End: 2022-09-05

## 2022-08-30 SDOH — ECONOMIC STABILITY: FOOD INSECURITY: WITHIN THE PAST 12 MONTHS, THE FOOD YOU BOUGHT JUST DIDN'T LAST AND YOU DIDN'T HAVE MONEY TO GET MORE.: NEVER TRUE

## 2022-08-30 SDOH — ECONOMIC STABILITY: FOOD INSECURITY: WITHIN THE PAST 12 MONTHS, YOU WORRIED THAT YOUR FOOD WOULD RUN OUT BEFORE YOU GOT MONEY TO BUY MORE.: NEVER TRUE

## 2022-08-30 ASSESSMENT — PATIENT HEALTH QUESTIONNAIRE - PHQ9
7. TROUBLE CONCENTRATING ON THINGS, SUCH AS READING THE NEWSPAPER OR WATCHING TELEVISION: 0
8. MOVING OR SPEAKING SO SLOWLY THAT OTHER PEOPLE COULD HAVE NOTICED. OR THE OPPOSITE, BEING SO FIGETY OR RESTLESS THAT YOU HAVE BEEN MOVING AROUND A LOT MORE THAN USUAL: 0
SUM OF ALL RESPONSES TO PHQ QUESTIONS 1-9: 0
6. FEELING BAD ABOUT YOURSELF - OR THAT YOU ARE A FAILURE OR HAVE LET YOURSELF OR YOUR FAMILY DOWN: 0
SUM OF ALL RESPONSES TO PHQ QUESTIONS 1-9: 0
9. THOUGHTS THAT YOU WOULD BE BETTER OFF DEAD, OR OF HURTING YOURSELF: 0
4. FEELING TIRED OR HAVING LITTLE ENERGY: 0
SUM OF ALL RESPONSES TO PHQ9 QUESTIONS 1 & 2: 0
3. TROUBLE FALLING OR STAYING ASLEEP: 0
1. LITTLE INTEREST OR PLEASURE IN DOING THINGS: 0
SUM OF ALL RESPONSES TO PHQ QUESTIONS 1-9: 0
10. IF YOU CHECKED OFF ANY PROBLEMS, HOW DIFFICULT HAVE THESE PROBLEMS MADE IT FOR YOU TO DO YOUR WORK, TAKE CARE OF THINGS AT HOME, OR GET ALONG WITH OTHER PEOPLE: 0
5. POOR APPETITE OR OVEREATING: 0
2. FEELING DOWN, DEPRESSED OR HOPELESS: 0
SUM OF ALL RESPONSES TO PHQ QUESTIONS 1-9: 0

## 2022-08-30 ASSESSMENT — SOCIAL DETERMINANTS OF HEALTH (SDOH): HOW HARD IS IT FOR YOU TO PAY FOR THE VERY BASICS LIKE FOOD, HOUSING, MEDICAL CARE, AND HEATING?: NOT HARD AT ALL

## 2022-08-30 NOTE — PROGRESS NOTES
SUBJECTIVE:  Ivan Cabrales is a 80 y.o. female being evaluated for:    Chief Complaint   Patient presents with    Cough      Patient is having cough , sore thort , chest congestion  . HPI   Symptoms started this week end  Post nasal drip and sore throat  Blowing her nose  nothing coming out    Right ear pain and itchig  Coughing Not productive except for foam  Cant get a deep breath in  feels needles and chest tightness    No heart racing   No fevers or chills   Coughs with breathing in    Achy all over  Feels heavy all over   Tired  more frequent stools  Nauseated    Has not taken a COVID TEST   Afraid to stick things in her nose       Allergies   Allergen Reactions    Lisinopril      cough     Current Outpatient Medications   Medication Sig Dispense Refill    benzonatate (TESSALON) 100 MG capsule Take 1 capsule by mouth 3 times daily as needed for Cough 30 capsule 0    azithromycin (ZITHROMAX) 250 MG tablet Take 1 tablet by mouth See Admin Instructions for 5 days 500mg on day 1 followed by 250mg on days 2 - 5 6 tablet 0    methylPREDNISolone (MEDROL DOSEPACK) 4 MG tablet Take by mouth as directed per package . 1 kit 0    furosemide (LASIX) 40 MG tablet TAKE 1 TABLET BY MOUTH EVERY DAY TAKE 1 EXTRA TABLET AS NEEDED IF WEIGHT GOES UP 2 POUNDS 180 tablet 1    apixaban (ELIQUIS) 5 MG TABS tablet TAKE 1 TABLET BY MOUTH TWICE DAILY 60 tablet 2    potassium chloride (KLOR-CON M) 20 MEQ extended release tablet Take 1 tablet by mouth daily 90 tablet 3    metoprolol tartrate (LOPRESSOR) 25 MG tablet Take 1 tablet by mouth 2 times daily 180 tablet 3    atorvastatin (LIPITOR) 20 MG tablet TAKE 1 TABLET BY MOUTH DAILY 90 tablet 0    Omega-3 Fatty Acids (FISH OIL) 1000 MG CAPS Take 3,000 mg by mouth daily      Coenzyme Q10 (COQ10) 100 MG CAPS Take 1 tablet by mouth daily       calcium-vitamin D (OSCAL) 250-125 MG-UNIT per tablet Take 1 tablet by mouth 2 times daily.         multivitamin (THERAGRAN) per tablet Take 1 tablet by mouth daily. molnupiravir 200 MG capsule Take 4 capsules by mouth in the morning and 4 capsules in the evening. Do all this for 5 days. 40 capsule 0    amiodarone (CORDARONE) 200 MG tablet Take 0.5 tablets by mouth daily 45 tablet 3     No current facility-administered medications for this visit. Social History     Socioeconomic History    Marital status:      Spouse name: Not on file    Number of children: Not on file    Years of education: Not on file    Highest education level: Not on file   Occupational History    Not on file   Tobacco Use    Smoking status: Never    Smokeless tobacco: Never   Vaping Use    Vaping Use: Never used   Substance and Sexual Activity    Alcohol use: Not Currently    Drug use: No    Sexual activity: Not Currently     Partners: Male   Other Topics Concern    Not on file   Social History Narrative    Not on file     Social Determinants of Health     Financial Resource Strain: Low Risk     Difficulty of Paying Living Expenses: Not hard at all   Food Insecurity: No Food Insecurity    Worried About Running Out of Food in the Last Year: Never true    [address] in the Last Year: Never true   Transportation Needs: No Transportation Needs    Lack of Transportation (Medical): No    Lack of Transportation (Non-Medical):  No   Physical Activity: Unknown    Days of Exercise per Week: 0 days    Minutes of Exercise per Session: Not on file   Stress: Not on file   Social Connections: Not on file   Intimate Partner Violence: Not on file   Housing Stability: Not on file      Past Medical History:   Diagnosis Date    Atrial fibrillation ([facility] [address]     Bleeding ulcer 01/2019    Fibromyalgia     History of blood transfusion 01/2019    2 units    Hyperlipidemia     Hypertension     Osteoporosis     RLS (restless legs syndrome)     Shingles     Stroke ([facility] [address] 2004    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      Past Surgical History:   Procedure Laterality Date BREAST SURGERY Left     lumpectomy    CARDIOVERSION      [facility]     COLONOSCOPY N/A 6/18/2019    COLONOSCOPY POLYPECTOMY COLD BIOPSY performed by Riki Arias MD at [facility]  9/07    scalp cyst excision    CYST REMOVAL      from left breast    HYSTERECTOMY, TOTAL ABDOMINAL (CERVIX REMOVED)      with bso     OVARIAN CYST REMOVAL Right     Age 24    UPPER GASTROINTESTINAL ENDOSCOPY N/A 1/16/2019    EGD ESOPHAGOGASTRODUODENOSCOPY performed by Riki Arias MD at [address] N/A 6/18/2019    EGD BIOPSY performed by Riki Arias MD at [address] of Systems   Constitutional:  Positive for fatigue. Negative for chills and fever. HENT:  Positive for ear pain, postnasal drip and sore throat. Negative for rhinorrhea. Respiratory:  Positive for cough, chest tightness and shortness of breath. Negative for wheezing. Cardiovascular:  Negative for chest pain and palpitations. Gastrointestinal:  Positive for diarrhea (more frequent) and nausea. Negative for abdominal pain and vomiting. Musculoskeletal:  Positive for myalgias. Neurological:  Positive for light-headedness and headaches. Negative for dizziness. OBJECTIVE:  /74 (Site: Right Upper Arm, Position: Sitting, Cuff Size: Medium Adult)   Pulse 65   Temp 98.8 °F (37.1 °C) (Oral)   Ht 5' 3\" (1.6 m)   Wt 146 lb 9.6 oz (66.5 kg)   LMP  (LMP Unknown)   SpO2 98%   BMI 25.97 kg/m²      Body mass index is 25.97 kg/m². Physical Exam  Constitutional:       General: She is not in acute distress. Appearance: Normal appearance. She is toxic-appearing. HENT:      Head: Normocephalic and atraumatic. Right Ear: Tympanic membrane and ear canal normal.      Left Ear: Tympanic membrane normal.      Nose: Congestion present. Mouth/Throat:      Pharynx: Posterior oropharyngeal erythema present. No oropharyngeal exudate.    Eyes:      Conjunctiva/sclera: Conjunctivae normal.   Cardiovascular:      Rate and Rhythm: Normal rate and regular rhythm. Heart sounds: Normal heart sounds. No murmur heard. No gallop. Pulmonary:      Effort: Pulmonary effort is normal.      Breath sounds: Normal breath sounds. Comments: Coughing   Abdominal:      General: There is no distension. Palpations: Abdomen is soft. Tenderness: There is no abdominal tenderness. Musculoskeletal:         General: No swelling. Cervical back: Neck supple. No rigidity or tenderness. Lymphadenopathy:      Cervical: No cervical adenopathy. Skin:     General: Skin is warm and dry. Findings: No rash. Neurological:      General: No focal deficit present. Mental Status: She is alert. Gait: Gait normal.       ASSESSMENT/PLAN:    Edgar Larkin was seen today for cough. Diagnoses and all orders for this visit:    Viral URI seems to have  if positive will treat with molnupiravir due to her other meds for atrial fib   -     COVID-19; Future    Bronchitis  -     azithromycin (ZITHROMAX) 250 MG tablet; Take 1 tablet by mouth See Admin Instructions for 5 days 500mg on day 1 followed by 250mg on days 2 - 5  -     benzonatate (TESSALON) 100 MG capsule; Take 1 capsule by mouth 3 times daily as needed for Cough  -     methylPREDNISolone (MEDROL DOSEPACK) 4 MG tablet; Take by mouth as directed per package . Orders Placed This Encounter   Medications    benzonatate (TESSALON) 100 MG capsule     Sig: Take 1 capsule by mouth 3 times daily as needed for Cough     Dispense:  30 capsule     Refill:  0    azithromycin (ZITHROMAX) 250 MG tablet     Sig: Take 1 tablet by mouth See Admin Instructions for 5 days 500mg on day 1 followed by 250mg on days 2 - 5     Dispense:  6 tablet     Refill:  0    methylPREDNISolone (MEDROL DOSEPACK) 4 MG tablet     Sig: Take by mouth as directed per package . Dispense:  1 kit     Refill:  0        Return if symptoms worsen or fail to improve.      There are no Patient Instructions on file for this visit.

## 2022-08-30 NOTE — TELEPHONE ENCOUNTER
Received call from Asael Nolan at [facility] with Red Flag Complaint. Subjective: Caller states \"congestion, sore throat , chest feels heavy\"     Current Symptoms: congestion , sore throat,h/a, cough chest feels heavy , arms feel heavy just does not feel well,hx afib, chf,htn, states does not feel sob currently but has a lot of coughing non productive    Onset: 2 days ago; gradual    Associated Symptoms: NA    Pain Severity: 8/10; aching; intermittent    Temperature: none as far as she knows    What has been tried: vit c ,honey    LMP: NA Pregnant: NA    Recommended disposition: Go to ED/UCC Now (Or to Office with PCP Approval)    Care advice provided, patient verbalizes understanding; denies any other questions or concerns; instructed to call back for any new or worsening symptoms. Writer provided warm transfer to trey at [facility] pc for sob, chest congestion      Attention Provider: Thank you for allowing me to participate in the care of your patient. The patient was connected to triage in response to information provided to the ECC/PSC. Please do not respond through this encounter as the response is not directed to a shared pool.      Reason for Disposition   Patient sounds very sick or weak to the triager    Protocols used: Breathing Difficulty-ADULT-[place]

## 2022-08-31 LAB — SARS-COV-2: DETECTED

## 2022-09-04 ASSESSMENT — ENCOUNTER SYMPTOMS
SHORTNESS OF BREATH: 1
COUGH: 1
NAUSEA: 1
SORE THROAT: 1
ABDOMINAL PAIN: 0
DIARRHEA: 1
VOMITING: 0
CHEST TIGHTNESS: 1
RHINORRHEA: 0
WHEEZING: 0

## 2022-09-23 ENCOUNTER — OFFICE VISIT (OUTPATIENT)
Dept: CARDIOLOGY CLINIC | Age: 82
End: 2022-09-23
Payer: MEDICARE

## 2022-09-23 VITALS
HEART RATE: 66 BPM | SYSTOLIC BLOOD PRESSURE: 128 MMHG | OXYGEN SATURATION: 96 % | DIASTOLIC BLOOD PRESSURE: 70 MMHG | WEIGHT: 146 LBS | BODY MASS INDEX: 25.87 KG/M2 | HEIGHT: 63 IN

## 2022-09-23 DIAGNOSIS — I48.0 PAF (PAROXYSMAL ATRIAL FIBRILLATION) (HCC): Primary | ICD-10-CM

## 2022-09-23 DIAGNOSIS — M79.89 LEG SWELLING: ICD-10-CM

## 2022-09-23 DIAGNOSIS — E78.5 HYPERLIPIDEMIA, UNSPECIFIED HYPERLIPIDEMIA TYPE: ICD-10-CM

## 2022-09-23 DIAGNOSIS — I10 ESSENTIAL HYPERTENSION: ICD-10-CM

## 2022-09-23 DIAGNOSIS — Z79.899 ENCOUNTER FOR MONITORING DIURETIC THERAPY: ICD-10-CM

## 2022-09-23 DIAGNOSIS — Z79.899 LONG TERM CURRENT USE OF AMIODARONE: ICD-10-CM

## 2022-09-23 DIAGNOSIS — Z51.81 ENCOUNTER FOR MONITORING DIURETIC THERAPY: ICD-10-CM

## 2022-09-23 PROCEDURE — 1123F ACP DISCUSS/DSCN MKR DOCD: CPT | Performed by: INTERNAL MEDICINE

## 2022-09-23 PROCEDURE — 93000 ELECTROCARDIOGRAM COMPLETE: CPT | Performed by: INTERNAL MEDICINE

## 2022-09-23 PROCEDURE — G8427 DOCREV CUR MEDS BY ELIG CLIN: HCPCS | Performed by: INTERNAL MEDICINE

## 2022-09-23 PROCEDURE — 1090F PRES/ABSN URINE INCON ASSESS: CPT | Performed by: INTERNAL MEDICINE

## 2022-09-23 PROCEDURE — 1036F TOBACCO NON-USER: CPT | Performed by: INTERNAL MEDICINE

## 2022-09-23 PROCEDURE — 99214 OFFICE O/P EST MOD 30 MIN: CPT | Performed by: INTERNAL MEDICINE

## 2022-09-23 PROCEDURE — G8399 PT W/DXA RESULTS DOCUMENT: HCPCS | Performed by: INTERNAL MEDICINE

## 2022-09-23 PROCEDURE — G8417 CALC BMI ABV UP PARAM F/U: HCPCS | Performed by: INTERNAL MEDICINE

## 2022-09-23 NOTE — PROGRESS NOTES
Kidney Disease Mother     Diabetes Maternal Grandmother     High Blood Pressure Maternal Grandfather     No Known Problems Paternal Grandmother     No Known Problems Paternal Grandfather     Diabetes Maternal Aunt      Social History     Tobacco Use    Smoking status: Never    Smokeless tobacco: Never   Vaping Use    Vaping Use: Never used   Substance Use Topics    Alcohol use: Not Currently    Drug use: No       Allergies   Allergen Reactions    Lisinopril      cough     Current Outpatient Medications   Medication Sig Dispense Refill    furosemide (LASIX) 40 MG tablet TAKE 1 TABLET BY MOUTH EVERY DAY TAKE 1 EXTRA TABLET AS NEEDED IF WEIGHT GOES UP 2 POUNDS 180 tablet 1    apixaban (ELIQUIS) 5 MG TABS tablet TAKE 1 TABLET BY MOUTH TWICE DAILY 60 tablet 2    potassium chloride (KLOR-CON M) 20 MEQ extended release tablet Take 1 tablet by mouth daily 90 tablet 3    amiodarone (CORDARONE) 200 MG tablet Take 0.5 tablets by mouth daily 45 tablet 3    metoprolol tartrate (LOPRESSOR) 25 MG tablet Take 1 tablet by mouth 2 times daily 180 tablet 3    atorvastatin (LIPITOR) 20 MG tablet TAKE 1 TABLET BY MOUTH DAILY 90 tablet 0    Omega-3 Fatty Acids (FISH OIL) 1000 MG CAPS Take 3,000 mg by mouth daily      Coenzyme Q10 (COQ10) 100 MG CAPS Take 1 tablet by mouth daily       calcium-vitamin D (OSCAL) 250-125 MG-UNIT per tablet Take 1 tablet by mouth 2 times daily. multivitamin (THERAGRAN) per tablet Take 1 tablet by mouth daily. No current facility-administered medications for this visit. Review of Systems:  Constitutional: no unanticipated weight loss. There's been no change in energy level, sleep pattern, or activity level. No fevers, chills. Eyes: No visual changes or diplopia. No scleral icterus. ENT: No Headaches, hearing loss or vertigo. No mouth sores or sore throat. Cardiovascular: as reviewed in HPI  Respiratory: No cough or wheezing, no sputum production. No hematemesis.     Gastrointestinal: No abdominal pain, appetite loss, blood in stools. No change in bowel or bladder habits. Genitourinary: No dysuria, trouble voiding, or hematuria. Musculoskeletal:  No gait disturbance, no joint complaints. Integumentary: No rash or pruritis. Neurological: No headache, diplopia, change in muscle strength, numbness or tingling. Psychiatric: No anxiety or depression. Endocrine: No temperature intolerance. No excessive thirst, fluid intake, or urination. No tremor. Hematologic/Lymphatic: No abnormal bruising or bleeding, blood clots or swollen lymph nodes. Allergic/Immunologic: No nasal congestion or hives. Physical Exam:   /70   Pulse 66   Ht 5' 3\" (1.6 m)   Wt 146 lb (66.2 kg)   LMP  (LMP Unknown)   SpO2 96%   BMI 25.86 kg/m²   Wt Readings from Last 3 Encounters:   09/23/22 146 lb (66.2 kg)   08/30/22 146 lb 9.6 oz (66.5 kg)   06/13/22 146 lb 9.6 oz (66.5 kg)     Constitutional: She is oriented to person, place, and time. She appears well-developed and well-nourished. In no acute distress. Head: Normocephalic and atraumatic. Pupils equal and round. Neck: Neck supple. No JVP or carotid bruit appreciated. No mass and no thyromegaly present. No lymphadenopathy present. Cardiovascular: Regular heart rhythm. Exam reveals no gallop and no friction rub. No murmur heard. Pulmonary/Chest: Effort normal and breath sounds normal. No respiratory distress. She has no wheezes, rhonchi or rales. Abdominal: Soft, non-tender. Bowel sounds are normal. She exhibits no organomegaly, mass or bruit. Extremities: No edema, cyanosis, or clubbing. Pulses are 2+ radial/dorsalis pedis/posterior tibial/carotid bilaterally. Neurological: No gross cranial nerve deficit. Coordination normal.   Skin: Skin is warm and dry. There is no rash or diaphoresis. Psychiatric: She has a normal mood and affect.  Her speech is normal and behavior is normal.     Lab Review:   Lab Results   Component Value Date/Time    TRIG 82 03/26/2022 09:11 AM    HDL 60 03/26/2022 09:11 AM    HDL 42 05/15/2012 04:05 AM    LDLCALC 97 03/26/2022 09:11 AM    LABVLDL 16 03/26/2022 09:11 AM     Lab Results   Component Value Date/Time    BUN 32 03/26/2022 09:11 AM    CREATININE 1.0 03/26/2022 09:11 AM     EKG Interpretation:   6/30/16 Normal Sinus Rhythm with old ASMI with frequent PAC's and isolated PVC. 11/12/18: Atrial fibrillation rate 69  12/17/19: Sinus rhythm LBBB  12/1/20:  Sinus  Rhythm with Left bundle branch block. 6/22/21: SB with LBBB, ~56 npm.   3/18/22 SB- reviewed  9/23/22: Sinus  Rhythm -Left bundle branch block and right axis. Image Review:     Holter Monitor 3/2016  NSR  Frequent Supraventricular ectopies with frequent runs of PSVT/PAT    Stress 3/11/2016  Summary   unable to gate due to irreg hr      Normal stress myocardial perfusion. There is normal isotope uptake at stress and rest. There is no evidence of   myocardial ischemia or scar. ECHO 3/11/2016  Left ventricle size is normal.  Normal left ventricular wall thickness. Ejection fraction is lower limit of normal visually estimated to be 50 %. No regional wall motion abnormalities are noted. Normal diastolic function. Mild mitral regurgitation is present. Trace to mild tricuspid regurgitation with an RVSP of 36 mmHg    ECHO 6/17/2013 ([facility])  Summary:  The left ventricular wall motion is normal.  Left ventricular systolic function is normal. (An ejection fraction  >or= to 55% is considered normal)  The right ventricle is borderline dilated. The left atrium is mildly dilated. The right atrium is mildly dilated. There is mild mitral regurgitation. Mild - moderate tricuspid regurgitation is present. Lexiscan Stress 6/18/2013  ([facility])  IMAGING FINDINGS:  Stress Images:   Stress images show minimal decreased radiotracer uptake along the anterior wall of the left ventricle near the base.   Rest images:   Rest images show normal perfusion with redistribution of the small area in the anterior wall base. LVEF:  72 percent . (Normal is 50% or greater). LV wall motion:   Normal  LVEDV:   91 mL        (Normal is up to 100ml for women and 150ml for men). Transient dilatation ratio:  1.1.  (Normal is up to 1.3 for women and 1.2 for men). ECHO 10/26/18  Left ventricular cavity size is normal.  Normal left ventricular wall thickness. Difficult to estimate EF due to rate/rhythm/arrhythmia but is lower limit of  normal  Mitral annular calcification is present. Mitral valve leaflets appear mildly thickened. Mild mitral regurgitation. The left atrium is dilated. right ventricular is slightly increased in size. Mild tricuspid regurgitation with RVSP of 37 mmHg. The right atrium is dilated. Assessment/Plan:     Atrial fibrillation/bradycardia   -She is S/p DCCV on 9/11/19-Reported symptoms of dizziness, palpitations, heart racing or shortness of breath and EKG at that time   -Today, denies any symptoms and has remained stable. -She appears in regular rhythm per physical exam and EKG>   -Continue Eliquis 5 mg BID, amiodarone and Lopressor. -TSH 3/26/22 wnl. 5/16/22 PFT's normal.   -repeat TSH, CBC and PFT prior to next follow up. Hypertension, essential   -Blood pressure is stable today for an octogenarian. Hyperlipidemia  LDL goal < 100 with no hx of CAD. 10/2020 LDL 85    3/26/22 LDLc 97  Continue Lipitor 20 mg daily. -CMP and lipid prior to next follow up. BLE edema  BMP stable except BUN 32, GFR BRIDGET 53. Continue Lasix and compression stockings. Low salt diet discussed and advised to elevate legs as much as possible. Follow up in 7-8 months. Thank you very much for allowing me to participate in the care of your patient. Please do not hesitate to contact me if you have any questions.     Sincerely,  Sherwin Lopez MD      [facility] [address] [place], [place] [address]Dimitrios Uriarte [facility]  Ph: [phone]  Fax: [phone]    This note was scribed in the presence of Dr. Margo Dinh MD by Norma Cortés RN.

## 2022-10-27 ENCOUNTER — OFFICE VISIT (OUTPATIENT)
Dept: FAMILY MEDICINE CLINIC | Age: 82
End: 2022-10-27
Payer: MEDICARE

## 2022-10-27 VITALS
BODY MASS INDEX: 26.54 KG/M2 | HEART RATE: 68 BPM | OXYGEN SATURATION: 98 % | TEMPERATURE: 98.1 F | WEIGHT: 149.8 LBS | SYSTOLIC BLOOD PRESSURE: 140 MMHG | DIASTOLIC BLOOD PRESSURE: 60 MMHG | HEIGHT: 63 IN

## 2022-10-27 DIAGNOSIS — H65.93 MIDDLE EAR EFFUSION, BILATERAL: Primary | ICD-10-CM

## 2022-10-27 DIAGNOSIS — R05.9 COUGH, UNSPECIFIED TYPE: ICD-10-CM

## 2022-10-27 DIAGNOSIS — J01.90 ACUTE SINUSITIS, RECURRENCE NOT SPECIFIED, UNSPECIFIED LOCATION: ICD-10-CM

## 2022-10-27 PROCEDURE — 1036F TOBACCO NON-USER: CPT | Performed by: NURSE PRACTITIONER

## 2022-10-27 PROCEDURE — 1090F PRES/ABSN URINE INCON ASSESS: CPT | Performed by: NURSE PRACTITIONER

## 2022-10-27 PROCEDURE — 1123F ACP DISCUSS/DSCN MKR DOCD: CPT | Performed by: NURSE PRACTITIONER

## 2022-10-27 PROCEDURE — G8417 CALC BMI ABV UP PARAM F/U: HCPCS | Performed by: NURSE PRACTITIONER

## 2022-10-27 PROCEDURE — 3074F SYST BP LT 130 MM HG: CPT | Performed by: NURSE PRACTITIONER

## 2022-10-27 PROCEDURE — 99213 OFFICE O/P EST LOW 20 MIN: CPT | Performed by: NURSE PRACTITIONER

## 2022-10-27 PROCEDURE — G8399 PT W/DXA RESULTS DOCUMENT: HCPCS | Performed by: NURSE PRACTITIONER

## 2022-10-27 PROCEDURE — G8427 DOCREV CUR MEDS BY ELIG CLIN: HCPCS | Performed by: NURSE PRACTITIONER

## 2022-10-27 PROCEDURE — 3078F DIAST BP <80 MM HG: CPT | Performed by: NURSE PRACTITIONER

## 2022-10-27 PROCEDURE — G8484 FLU IMMUNIZE NO ADMIN: HCPCS | Performed by: NURSE PRACTITIONER

## 2022-10-27 RX ORDER — AZITHROMYCIN 250 MG/1
250 TABLET, FILM COATED ORAL SEE ADMIN INSTRUCTIONS
Qty: 6 TABLET | Refills: 0 | Status: SHIPPED | OUTPATIENT
Start: 2022-10-27 | End: 2022-11-01

## 2022-10-27 RX ORDER — METHYLPREDNISOLONE 4 MG/1
TABLET ORAL
Qty: 1 KIT | Refills: 0 | Status: SHIPPED | OUTPATIENT
Start: 2022-10-27 | End: 2022-11-02

## 2022-10-27 NOTE — PROGRESS NOTES
Sander Beauchamp (:  1940) is a 80 y.o. female,Established patient, here for evaluation of the following chief complaint(s):  Cough (For the past 5 days having runny nose, sore throat, productive yellow cough. No fever. This morning had some balance issues. Cough has gotten worse. Was taking honey, tylenol, tea and Vit C. Has not taken any Covid test.)         ASSESSMENT/PLAN:  1. Middle ear effusion, bilateral  New, take medication as prescribed, follow pt education included    2. Acute sinusitis, recurrence not specified, unspecified location  New, take medication as prescribed, follow pt education included    3. Cough, unspecified type  New, take medication as prescribed, follow pt education included     Return if symptoms worsen or fail to improve. Subjective   SUBJECTIVE/OBJECTIVE:  JACOBY  Presents today for above c/o. States she did not go to work today due to feeling bad and dizziness, kym ear pain. States her kym ears are itching, worse than normal, leading down into her throat. OTC treatment with no relief. Denies fevers, abd pain, n/v.   Current Outpatient Medications   Medication Sig Dispense Refill    metoprolol tartrate (LOPRESSOR) 25 MG tablet TAKE 1 TABLET BY MOUTH TWICE DAILY 180 tablet 3    furosemide (LASIX) 40 MG tablet TAKE 1 TABLET BY MOUTH EVERY DAY TAKE 1 EXTRA TABLET AS NEEDED IF WEIGHT GOES UP 2 POUNDS 180 tablet 1    potassium chloride (KLOR-CON M) 20 MEQ extended release tablet Take 1 tablet by mouth daily 90 tablet 3    amiodarone (CORDARONE) 200 MG tablet Take 0.5 tablets by mouth daily 45 tablet 3    atorvastatin (LIPITOR) 20 MG tablet TAKE 1 TABLET BY MOUTH DAILY 90 tablet 0    Omega-3 Fatty Acids (FISH OIL) 1000 MG CAPS Take 3,000 mg by mouth daily      Coenzyme Q10 (COQ10) 100 MG CAPS Take 1 tablet by mouth daily       calcium-vitamin D (OSCAL) 250-125 MG-UNIT per tablet Take 1 tablet by mouth 2 times daily.         multivitamin (THERAGRAN) per tablet Take 1 tablet by mouth daily. apixaban (ELIQUIS) 5 MG TABS tablet TAKE 1 TABLET BY MOUTH TWICE DAILY 60 tablet 2     No current facility-administered medications for this visit. 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 HENT:  Positive for congestion, ear pain, postnasal drip, sinus pressure and sinus pain. Respiratory:  Positive for cough and chest tightness. Gastrointestinal:  Negative for abdominal pain. Neurological:  Positive for dizziness and headaches. Objective   Physical Exam  HENT:      Head: Normocephalic and atraumatic. Right Ear: A middle ear effusion is present. Left Ear: A middle ear effusion is present. Nose: Rhinorrhea present. Mouth/Throat:      Pharynx: Posterior oropharyngeal erythema present. Cardiovascular:      Rate and Rhythm: Normal rate and regular rhythm. Heart sounds: Normal heart sounds. No murmur heard. Pulmonary:      Breath sounds: Normal breath sounds. No wheezing. Abdominal:      General: Bowel sounds are normal.      Palpations: Abdomen is soft. Musculoskeletal:      Right lower leg: No edema. Left lower leg: No edema.             --ZOILA Shukla NP

## 2022-10-27 NOTE — LETTER
[employer] [facility]  [address] [address]  Phone: [phone]  Fax: [phone]    ZOILA Allen NP        October 27, 2022     Patient: Vanita Barreto   YOB: 1940   Date of Visit: 10/27/2022       To Whom It May Concern: It is my medical opinion that Kayden Tracy be off work 10/27-10/28/2022. If you have any questions or concerns, please don't hesitate to call.     Sincerely,        ZOILA Allen NP

## 2022-11-03 ASSESSMENT — ENCOUNTER SYMPTOMS
ABDOMINAL PAIN: 0
SINUS PAIN: 1
SINUS PRESSURE: 1
CHEST TIGHTNESS: 1
COUGH: 1

## 2023-01-10 ENCOUNTER — TELEPHONE (OUTPATIENT)
Dept: FAMILY MEDICINE CLINIC | Age: 83
End: 2023-01-10

## 2023-01-10 NOTE — TELEPHONE ENCOUNTER
----- Message from Fabian Jen sent at 1/10/2023 11:58 AM EST -----  Subject: Appointment Request    Reason for Call: Established Patient Appointment needed: Urgent (Patient   Request) No Script    QUESTIONS    Reason for appointment request? Available appointments did not meet   patient need     Additional Information for Provider? Patient is calling because she would   like to be seen one day this week if possible to go over her Ultrasound   results. She would like a late appointment.  please advise  ---------------------------------------------------------------------------  --------------  Krish Adan INFO  [phone]; OK to leave message on voicemail  ---------------------------------------------------------------------------  --------------  SCRIPT ANSWERS  CAMMIEID Screen: Timoteo Sheridan

## 2023-01-11 ENCOUNTER — OFFICE VISIT (OUTPATIENT)
Dept: FAMILY MEDICINE CLINIC | Age: 83
End: 2023-01-11

## 2023-01-11 VITALS
BODY MASS INDEX: 26.22 KG/M2 | HEART RATE: 68 BPM | SYSTOLIC BLOOD PRESSURE: 126 MMHG | HEIGHT: 63 IN | OXYGEN SATURATION: 98 % | TEMPERATURE: 97.6 F | WEIGHT: 148 LBS | DIASTOLIC BLOOD PRESSURE: 84 MMHG

## 2023-01-11 DIAGNOSIS — R19.04 ABDOMINAL MASS, LLQ (LEFT LOWER QUADRANT): ICD-10-CM

## 2023-01-11 DIAGNOSIS — R73.9 HYPERGLYCEMIA: ICD-10-CM

## 2023-01-11 DIAGNOSIS — E78.00 HYPERCHOLESTEREMIA: ICD-10-CM

## 2023-01-11 DIAGNOSIS — I48.0 PAF (PAROXYSMAL ATRIAL FIBRILLATION) (HCC): ICD-10-CM

## 2023-01-11 DIAGNOSIS — Z51.81 MEDICATION MONITORING ENCOUNTER: ICD-10-CM

## 2023-01-11 DIAGNOSIS — R10.32 LLQ PAIN: Primary | ICD-10-CM

## 2023-01-11 DIAGNOSIS — I50.32 CHRONIC DIASTOLIC (CONGESTIVE) HEART FAILURE (HCC): ICD-10-CM

## 2023-01-11 ASSESSMENT — PATIENT HEALTH QUESTIONNAIRE - PHQ9
5. POOR APPETITE OR OVEREATING: 0
10. IF YOU CHECKED OFF ANY PROBLEMS, HOW DIFFICULT HAVE THESE PROBLEMS MADE IT FOR YOU TO DO YOUR WORK, TAKE CARE OF THINGS AT HOME, OR GET ALONG WITH OTHER PEOPLE: 0
9. THOUGHTS THAT YOU WOULD BE BETTER OFF DEAD, OR OF HURTING YOURSELF: 0
SUM OF ALL RESPONSES TO PHQ QUESTIONS 1-9: 0
6. FEELING BAD ABOUT YOURSELF - OR THAT YOU ARE A FAILURE OR HAVE LET YOURSELF OR YOUR FAMILY DOWN: 0
SUM OF ALL RESPONSES TO PHQ QUESTIONS 1-9: 0
4. FEELING TIRED OR HAVING LITTLE ENERGY: 0
SUM OF ALL RESPONSES TO PHQ9 QUESTIONS 1 & 2: 0
1. LITTLE INTEREST OR PLEASURE IN DOING THINGS: 0
SUM OF ALL RESPONSES TO PHQ QUESTIONS 1-9: 0
3. TROUBLE FALLING OR STAYING ASLEEP: 0
7. TROUBLE CONCENTRATING ON THINGS, SUCH AS READING THE NEWSPAPER OR WATCHING TELEVISION: 0
SUM OF ALL RESPONSES TO PHQ QUESTIONS 1-9: 0
2. FEELING DOWN, DEPRESSED OR HOPELESS: 0
8. MOVING OR SPEAKING SO SLOWLY THAT OTHER PEOPLE COULD HAVE NOTICED. OR THE OPPOSITE, BEING SO FIGETY OR RESTLESS THAT YOU HAVE BEEN MOVING AROUND A LOT MORE THAN USUAL: 0

## 2023-01-11 ASSESSMENT — ENCOUNTER SYMPTOMS
DIARRHEA: 0
ABDOMINAL DISTENTION: 1
NAUSEA: 0
SHORTNESS OF BREATH: 0
COUGH: 0
VOMITING: 0
ABDOMINAL PAIN: 1
BLOOD IN STOOL: 0
CONSTIPATION: 0

## 2023-01-11 NOTE — PROGRESS NOTES
SUBJECTIVE:  Elsworth Mcburney is a 80 y.o. female being evaluated for:    Chief Complaint   Patient presents with    Follow-up      Patient is here for review ultrasounds . HPI   Pain in left lower quadrant  Started on Sunday  ultrasound not a hernia    NO diarrhea constipation or nausea or vomiting  Heart is good  No fevers or chills  NO dysuria or other symptoms mass in llq  Problem going on since June  not getting bigger  Pain is worsening liz on this Sunday   Allergies   Allergen Reactions    Lisinopril      cough     Current Outpatient Medications   Medication Sig Dispense Refill    apixaban (ELIQUIS) 5 MG TABS tablet TAKE 1 TABLET BY MOUTH TWICE DAILY 60 tablet 2    metoprolol tartrate (LOPRESSOR) 25 MG tablet TAKE 1 TABLET BY MOUTH TWICE DAILY 180 tablet 3    furosemide (LASIX) 40 MG tablet TAKE 1 TABLET BY MOUTH EVERY DAY TAKE 1 EXTRA TABLET AS NEEDED IF WEIGHT GOES UP 2 POUNDS 180 tablet 1    potassium chloride (KLOR-CON M) 20 MEQ extended release tablet Take 1 tablet by mouth daily 90 tablet 3    amiodarone (CORDARONE) 200 MG tablet Take 0.5 tablets by mouth daily 45 tablet 3    atorvastatin (LIPITOR) 20 MG tablet TAKE 1 TABLET BY MOUTH DAILY 90 tablet 0    Omega-3 Fatty Acids (FISH OIL) 1000 MG CAPS Take 3,000 mg by mouth daily      Coenzyme Q10 (COQ10) 100 MG CAPS Take 1 tablet by mouth daily       calcium-vitamin D (OSCAL) 250-125 MG-UNIT per tablet Take 1 tablet by mouth 2 times daily. multivitamin (THERAGRAN) per tablet Take 1 tablet by mouth daily. No current facility-administered medications for this visit. Social History     Socioeconomic History    Marital status:       Spouse name: Not on file    Number of children: Not on file    Years of education: Not on file    Highest education level: Not on file   Occupational History    Not on file   Tobacco Use    Smoking status: Never    Smokeless tobacco: Never   Vaping Use    Vaping Use: Never used   Substance and Sexual Activity    Alcohol use: Not Currently    Drug use: No    Sexual activity: Not Currently     Partners: Male   Other Topics Concern    Not on file   Social History Narrative    Not on file     Social Determinants of Health     Financial Resource Strain: Low Risk     Difficulty of Paying Living Expenses: Not hard at all   Food Insecurity: No Food Insecurity    Worried About Running Out of Food in the Last Year: Never true    [address] in the Last Year: Never true   Transportation Needs: No Transportation Needs    Lack of Transportation (Medical): No    Lack of Transportation (Non-Medical):  No   Physical Activity: Unknown    Days of Exercise per Week: 0 days    Minutes of Exercise per Session: Not on file   Stress: Not on file   Social Connections: Not on file   Intimate Partner Violence: Not on file   Housing Stability: Not on file      Past Medical History:   Diagnosis Date    Atrial fibrillation ([facility] [address] [address]     Bleeding ulcer 01/2019    Fibromyalgia     History of blood transfusion 01/2019    2 units    Hyperlipidemia     Hypertension     Osteoporosis     RLS (restless legs syndrome)     Shingles     Stroke ([facility] [address] [facility] 2004    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      Past Surgical History:   Procedure Laterality Date    BREAST SURGERY Left     lumpectomy    [facility]ION      [facility]     COLONOSCOPY N/A 6/18/2019    COLONOSCOPY POLYPECTOMY COLD BIOPSY performed by Priscilla Jiang MD at [address] [place] [address]  9/07    scalp cyst excision    CYST REMOVAL      from left breast    HYSTERECTOMY, TOTAL ABDOMINAL (CERVIX REMOVED)      with bso     OVARIAN CYST REMOVAL Right     Age 24    UPPER GASTROINTESTINAL ENDOSCOPY N/A 1/16/2019    EGD ESOPHAGOGASTRODUODENOSCOPY performed by Priscilla Jiang MD at [address] 6/18/2019    EGD BIOPSY performed by Priscilla Jiang MD at [facility]   Constitutional:  Negative for chills and fever.   Respiratory:  Negative for cough and shortness of breath. Cardiovascular:  Negative for chest pain, palpitations and leg swelling. Gastrointestinal:  Positive for abdominal distention and abdominal pain. Negative for blood in stool, constipation, diarrhea, nausea and vomiting. Genitourinary:  Negative for dysuria and hematuria. OBJECTIVE:  /84 (Site: Left Upper Arm, Position: Sitting, Cuff Size: Medium Adult)   Pulse 68   Temp 97.6 °F (36.4 °C) (Oral)   Ht 5' 3\" (1.6 m)   Wt 148 lb (67.1 kg)   LMP  (LMP Unknown)   SpO2 98%   BMI 26.22 kg/m²      Body mass index is 26.22 kg/m². Physical Exam  Constitutional:       General: She is not in acute distress. Appearance: Normal appearance. She is well-developed. HENT:      Head: Normocephalic and atraumatic. Eyes:      Conjunctiva/sclera: Conjunctivae normal.   Neck:      Thyroid: No thyromegaly. Vascular: No JVD. Cardiovascular:      Rate and Rhythm: Normal rate and regular rhythm. Heart sounds: Normal heart sounds. No murmur heard. No friction rub. No gallop. Comments: Varicose veins on her left thigh   Pulmonary:      Effort: Pulmonary effort is normal.      Breath sounds: Normal breath sounds. Chest:      Chest wall: No tenderness. Abdominal:      General: There is no distension. Palpations: Abdomen is soft. There is mass. Tenderness: There is abdominal tenderness. There is no right CVA tenderness, left CVA tenderness, guarding or rebound. Comments: Llq mass and tenderness llq    Musculoskeletal:         General: No swelling. Cervical back: Neck supple. Lymphadenopathy:      Cervical: No cervical adenopathy. Skin:     General: Skin is warm and dry. Neurological:      General: No focal deficit present. Mental Status: She is alert. Gait: Gait normal.       ASSESSMENT/PLAN:    Danial Batista was seen today for follow-up.     Diagnoses and all orders for this visit:    Q pain  -     CBC with Auto Differential; Future  -     Comprehensive Metabolic Panel; Future  -     CT ABDOMEN PELVIS W IV CONTRAST Additional Contrast? Radiologist Recommendation; Future    Abdominal mass, LLQ (left lower quadrant)  -     CT ABDOMEN PELVIS W IV CONTRAST Additional Contrast? Radiologist Recommendation; Future    PAF (paroxysmal atrial fibrillation) (HCC) in sinus on NOAC     Chronic diastolic (congestive) heart failure (HCC) nos signs of fluid retention     Hyperglycemia  -     Hemoglobin A1C; Future    Hypercholesteremia  -     Lipid Panel; Future    Medication monitoring encounter      No orders of the defined types were placed in this encounter. Return if symptoms worsen or fail to improve. There are no Patient Instructions on file for this visit.

## 2023-01-12 ENCOUNTER — TELEPHONE (OUTPATIENT)
Dept: CARDIOLOGY CLINIC | Age: 83
End: 2023-01-12

## 2023-01-12 NOTE — TELEPHONE ENCOUNTER
Agapito Jackson called in this morning, she is having a CT of the Abdomen done on 1/14, she states she checked with Dr. Wanda Knapp and that office told her to call here to see if she needs to stop any medications before the testing. She can be reached at [phone].

## 2023-01-12 NOTE — TELEPHONE ENCOUNTER
When scheduled, they provide instructions for the CT scan. There is nothing from a cardiac standpoint she would need to hold. If she wants to take her lasix after that is fine.

## 2023-01-13 ENCOUNTER — HOSPITAL ENCOUNTER (EMERGENCY)
Age: 83
Discharge: HOME | End: 2023-01-13
Attending: EMERGENCY MEDICINE
Payer: MEDICARE

## 2023-01-13 ENCOUNTER — TELEPHONE (OUTPATIENT)
Dept: FAMILY MEDICINE CLINIC | Age: 83
End: 2023-01-13

## 2023-01-13 ENCOUNTER — APPOINTMENT (OUTPATIENT)
Dept: CT IMAGING | Age: 83
End: 2023-01-13
Payer: MEDICARE

## 2023-01-13 VITALS
RESPIRATION RATE: 17 BRPM | DIASTOLIC BLOOD PRESSURE: 54 MMHG | OXYGEN SATURATION: 100 % | HEIGHT: 63 IN | TEMPERATURE: 97.7 F | HEART RATE: 50 BPM | BODY MASS INDEX: 26.8 KG/M2 | SYSTOLIC BLOOD PRESSURE: 178 MMHG | WEIGHT: 151.24 LBS

## 2023-01-13 DIAGNOSIS — R10.9 LEFT FLANK PAIN: ICD-10-CM

## 2023-01-13 DIAGNOSIS — R10.32 LEFT GROIN PAIN: Primary | ICD-10-CM

## 2023-01-13 LAB
A/G RATIO: 1.6 (ref 1.1–2.2)
ALBUMIN SERPL-MCNC: 4.3 G/DL (ref 3.4–5)
ALP BLD-CCNC: 68 U/L (ref 40–129)
ALT SERPL-CCNC: 18 U/L (ref 10–40)
ANION GAP SERPL CALCULATED.3IONS-SCNC: 11 MMOL/L (ref 3–16)
AST SERPL-CCNC: 17 U/L (ref 15–37)
BASOPHILS ABSOLUTE: 0.1 K/UL (ref 0–0.2)
BASOPHILS RELATIVE PERCENT: 1 %
BILIRUB SERPL-MCNC: 0.3 MG/DL (ref 0–1)
BILIRUBIN URINE: NEGATIVE
BLOOD, URINE: NEGATIVE
BUN BLDV-MCNC: 56 MG/DL (ref 7–20)
CALCIUM SERPL-MCNC: 9.5 MG/DL (ref 8.3–10.6)
CHLORIDE BLD-SCNC: 101 MMOL/L (ref 99–110)
CLARITY: CLEAR
CO2: 29 MMOL/L (ref 21–32)
COLOR: YELLOW
CREAT SERPL-MCNC: 1.1 MG/DL (ref 0.6–1.2)
EOSINOPHILS ABSOLUTE: 0.2 K/UL (ref 0–0.6)
EOSINOPHILS RELATIVE PERCENT: 2.8 %
EPITHELIAL CELLS, UA: ABNORMAL /HPF (ref 0–5)
GFR SERPL CREATININE-BSD FRML MDRD: 50 ML/MIN/{1.73_M2}
GLUCOSE BLD-MCNC: 105 MG/DL (ref 70–99)
GLUCOSE URINE: NEGATIVE MG/DL
HCT VFR BLD CALC: 39.1 % (ref 36–48)
HEMOGLOBIN: 12.4 G/DL (ref 12–16)
IMMATURE GRANULOCYTES #: 0 K/UL (ref 0–0.2)
IMMATURE GRANULOCYTES %: 0.1 %
KETONES, URINE: NEGATIVE MG/DL
LEUKOCYTE ESTERASE, URINE: ABNORMAL
LYMPHOCYTES ABSOLUTE: 1.6 K/UL (ref 1–5.1)
LYMPHOCYTES RELATIVE PERCENT: 24.4 %
MCH RBC QN AUTO: 29.7 PG (ref 26–34)
MCHC RBC AUTO-ENTMCNC: 31.7 G/DL (ref 32–36.4)
MCV RBC AUTO: 93.5 FL (ref 80–100)
MICROSCOPIC EXAMINATION: YES
MONOCYTES ABSOLUTE: 0.4 K/UL (ref 0–1.3)
MONOCYTES RELATIVE PERCENT: 6.6 %
MUCUS: ABNORMAL /LPF
NEUTROPHILS ABSOLUTE: 4.3 K/UL (ref 1.7–7.7)
NEUTROPHILS RELATIVE PERCENT: 65.1 %
NITRITE, URINE: NEGATIVE
PDW BLD-RTO: 13.8 % (ref 12.4–15.4)
PH UA: 7 (ref 5–8)
PLATELET # BLD: 184 K/UL (ref 135–450)
PMV BLD AUTO: 12 FL (ref 5–10.5)
POTASSIUM REFLEX MAGNESIUM: 4.1 MMOL/L (ref 3.5–5.1)
PROTEIN UA: NEGATIVE MG/DL
RBC # BLD: 4.18 M/UL (ref 4–5.2)
RBC UA: ABNORMAL /HPF (ref 0–4)
SODIUM BLD-SCNC: 141 MMOL/L (ref 136–145)
SPECIFIC GRAVITY UA: 1.01 (ref 1–1.03)
TOTAL PROTEIN: 7 G/DL (ref 6.4–8.2)
URINE REFLEX TO CULTURE: ABNORMAL
URINE TYPE: ABNORMAL
UROBILINOGEN, URINE: 0.2 E.U./DL
WBC # BLD: 6.7 K/UL (ref 4–11)
WBC UA: ABNORMAL /HPF (ref 0–5)

## 2023-01-13 PROCEDURE — 6360000004 HC RX CONTRAST MEDICATION: Performed by: EMERGENCY MEDICINE

## 2023-01-13 PROCEDURE — 6360000002 HC RX W HCPCS: Performed by: EMERGENCY MEDICINE

## 2023-01-13 PROCEDURE — 85025 COMPLETE CBC W/AUTO DIFF WBC: CPT

## 2023-01-13 PROCEDURE — 99285 EMERGENCY DEPT VISIT HI MDM: CPT

## 2023-01-13 PROCEDURE — 81001 URINALYSIS AUTO W/SCOPE: CPT

## 2023-01-13 PROCEDURE — 96374 THER/PROPH/DIAG INJ IV PUSH: CPT

## 2023-01-13 PROCEDURE — 74177 CT ABD & PELVIS W/CONTRAST: CPT

## 2023-01-13 PROCEDURE — 36415 COLL VENOUS BLD VENIPUNCTURE: CPT

## 2023-01-13 PROCEDURE — 80053 COMPREHEN METABOLIC PANEL: CPT

## 2023-01-13 RX ORDER — KETOROLAC TROMETHAMINE 30 MG/ML
15 INJECTION, SOLUTION INTRAMUSCULAR; INTRAVENOUS ONCE
Status: COMPLETED | OUTPATIENT
Start: 2023-01-13 | End: 2023-01-13

## 2023-01-13 RX ORDER — HYDROCODONE BITARTRATE AND ACETAMINOPHEN 5; 325 MG/1; MG/1
1 TABLET ORAL EVERY 8 HOURS PRN
Qty: 8 TABLET | Refills: 0 | Status: SHIPPED | OUTPATIENT
Start: 2023-01-13 | End: 2023-01-16

## 2023-01-13 RX ADMIN — IOPAMIDOL 100 ML: 755 INJECTION, SOLUTION INTRAVENOUS at 10:40

## 2023-01-13 RX ADMIN — KETOROLAC TROMETHAMINE 15 MG: 30 INJECTION, SOLUTION INTRAMUSCULAR at 10:06

## 2023-01-13 ASSESSMENT — PAIN DESCRIPTION - PAIN TYPE
TYPE: ACUTE PAIN
TYPE: ACUTE PAIN

## 2023-01-13 ASSESSMENT — PAIN DESCRIPTION - ORIENTATION
ORIENTATION: LEFT

## 2023-01-13 ASSESSMENT — PAIN DESCRIPTION - LOCATION
LOCATION: BACK;FLANK

## 2023-01-13 ASSESSMENT — PAIN - FUNCTIONAL ASSESSMENT
PAIN_FUNCTIONAL_ASSESSMENT: 0-10
PAIN_FUNCTIONAL_ASSESSMENT: PREVENTS OR INTERFERES SOME ACTIVE ACTIVITIES AND ADLS
PAIN_FUNCTIONAL_ASSESSMENT: ACTIVITIES ARE NOT PREVENTED

## 2023-01-13 ASSESSMENT — PAIN DESCRIPTION - DESCRIPTORS
DESCRIPTORS: ACHING
DESCRIPTORS: ACHING

## 2023-01-13 ASSESSMENT — LIFESTYLE VARIABLES: HOW OFTEN DO YOU HAVE A DRINK CONTAINING ALCOHOL: NEVER

## 2023-01-13 ASSESSMENT — PAIN DESCRIPTION - FREQUENCY
FREQUENCY: INTERMITTENT
FREQUENCY: INTERMITTENT

## 2023-01-13 ASSESSMENT — PAIN SCALES - GENERAL
PAINLEVEL_OUTOF10: 0
PAINLEVEL_OUTOF10: 8
PAINLEVEL_OUTOF10: 2
PAINLEVEL_OUTOF10: 7

## 2023-01-13 NOTE — LETTER
[facility] [address]  Phone: [phone]               January 13, 2023    Patient: Imani Collier   YOB: 1940   Date of Visit: 1/13/2023       To Whom It May Concern:    Clemencia Leung was seen and treated in our emergency department on 1/13/2023. She may return to work on 1/16/23.       Sincerely,       Dr. Hilaria Siddiqui MD        Signature:__________________________________

## 2023-01-13 NOTE — TELEPHONE ENCOUNTER
Spoke to pt advising her there are no open appointments prior to 1/24/23. Pt voiced understanding and will wait until the 1/24.

## 2023-01-13 NOTE — ED PROVIDER NOTES
[facility] Emergency Department      Pt Name: Nellie Rooney  MRN: [id number]  [place] 1940  Date of evaluation: 1/13/2023  Provider: Olivia Miller MD  CHIEF COMPLAINT  Chief Complaint   Patient presents with    Flank Pain     Yesterday developed left sided back and flank pain. Took Tylenol at 0700 today. Pain is not as bad if she sits still. No dysuria. HPI  Nellie Rooney is a 80 y.o. female who presents because of flank pain. Pain is on the left side in her back and left lower quadrant. It started on Sunday. She has been taking Tylenol without relief. Her last dose was 7 AM.  She denies any known fever. Denies any dysuria or frequency. She has been having bowel movements and her last one was yesterday. Denies nausea or vomiting. Rates pain 8 on a scale of 10. She does have a known mass in her left lower quadrant. She had an ultrasound of it during the summer and it was not a hernia. She was told to follow-up with a surgeon but never followed up. She is anticoagulated with Eliquis and has a history of A. fib. Denies any chest pain or palpitations. Her doctor scheduled her for a CT scan with contrast for tomorrow morning but she did not feel she could wait that long. Past surgical history includes hysterectomy and ovarian cyst surgery. REVIEW OF SYSTEMS:  No fever, no chest pain, no bowel pattern change Pertinent positives and negatives as per the HPI. All other pertinent review of systems reviewed and negative. Nursing notes reviewed.     PAST MEDICAL HISTORY  Past Medical History:   Diagnosis Date    Atrial fibrillation ([address]     Bleeding ulcer 01/2019    Fibromyalgia     History of blood transfusion 01/2019    2 units    Hyperlipidemia     Hypertension     Osteoporosis     RLS (restless legs syndrome)     Shingles     Stroke ([address] 2004    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      SURGICAL HISTORY  Past Surgical History:   Procedure Laterality Date BREAST SURGERY Left     lumpectomy    CARDIOVERSION      [facility]     COLONOSCOPY N/A 6/18/2019    COLONOSCOPY POLYPECTOMY COLD BIOPSY performed by Terrance Mosley MD at [facility]  9/07    scalp cyst excision    CYST REMOVAL      from left breast    HYSTERECTOMY, TOTAL ABDOMINAL (CERVIX REMOVED)      with bso     OVARIAN CYST REMOVAL Right     Age 24    UPPER GASTROINTESTINAL ENDOSCOPY N/A 1/16/2019    EGD ESOPHAGOGASTRODUODENOSCOPY performed by Terrance Mosley MD at [facility] N/A 6/18/2019    EGD BIOPSY performed by Terrance Mosley MD at [facility]:  No current facility-administered medications on file prior to encounter. Current Outpatient Medications on File Prior to Encounter   Medication Sig Dispense Refill    apixaban (ELIQUIS) 5 MG TABS tablet TAKE 1 TABLET BY MOUTH TWICE DAILY 60 tablet 2    metoprolol tartrate (LOPRESSOR) 25 MG tablet TAKE 1 TABLET BY MOUTH TWICE DAILY 180 tablet 3    furosemide (LASIX) 40 MG tablet TAKE 1 TABLET BY MOUTH EVERY DAY TAKE 1 EXTRA TABLET AS NEEDED IF WEIGHT GOES UP 2 POUNDS 180 tablet 1    potassium chloride (KLOR-CON M) 20 MEQ extended release tablet Take 1 tablet by mouth daily 90 tablet 3    amiodarone (CORDARONE) 200 MG tablet Take 0.5 tablets by mouth daily 45 tablet 3    atorvastatin (LIPITOR) 20 MG tablet TAKE 1 TABLET BY MOUTH DAILY 90 tablet 0    Omega-3 Fatty Acids (FISH OIL) 1000 MG CAPS Take 3,000 mg by mouth daily      Coenzyme Q10 (COQ10) 100 MG CAPS Take 1 tablet by mouth daily       calcium-vitamin D (OSCAL) 250-125 MG-UNIT per tablet Take 1 tablet by mouth 2 times daily. multivitamin (THERAGRAN) per tablet Take 1 tablet by mouth daily.          ALLERGIES  Lisinopril  FAMILY HISTORY:  Family History   Problem Relation Age of Onset    Heart Disease Mother         CAD    Diabetes Mother     Other Mother         fall    Kidney Disease Mother     Diabetes Maternal Grandmother High Blood Pressure Maternal Grandfather     No Known Problems Paternal Grandmother     No Known Problems Paternal Grandfather     Diabetes Maternal Aunt      SOCIAL HISTORY:  Social History     Tobacco Use    Smoking status: Never    Smokeless tobacco: Never   Vaping Use    Vaping Use: Never used   Substance Use Topics    Alcohol use: Not Currently    Drug use: No     IMMUNIZATIONS:  Noncontributory    PHYSICAL EXAM  VITAL SIGNS:  Blood pressure (!) 160/70, pulse 59, temperature 97.7 °F (36.5 °C), temperature source Oral, resp. rate 18, height 5' 3\" (1.6 m), weight 151 lb 3.8 oz (68.6 kg), SpO2 97 %, not currently breastfeeding. Constitutional:  80 y.o. female alert, cooperative  HENT:  Atraumatic, mucous membranes moist  Eyes:   Conjunctiva clear, no icterus  Neck:  Supple, no adenopathy, no JVD  Cardiovascular:  Regular, no rubs  Thorax & Lungs:  No accessory muscle usage, clear  Abdomen:  Soft, non distended, no pulsatility or bruits, bowel sounds present, LLQ (left groin) mass tender to touch  Back:  No deformity, no bruising, CVA tenderness none, no rash  Genitalia:  No groin swelling  Rectal:  Deferred  Extremities:  No cyanosis, no edema, capillary refill distally intact  Skin:  Warm, dry  Neurologic:  Alert, no slurred speech, no focal deficits, coordination of extremities normal, steady gait  Psychiatric:  Affect appropriate    RESULTS / MEDICAL DECISION MAKING:  Labs resulted at the time of this note reviewed.   Labs Reviewed   URINALYSIS WITH REFLEX TO CULTURE - Abnormal; Notable for the following components:       Result Value    Leukocyte Esterase, Urine TRACE (*)     All other components within normal limits   CBC WITH AUTO DIFFERENTIAL - Abnormal; Notable for the following components:    MCHC 31.7 (*)     MPV 12.0 (*)     All other components within normal limits   COMPREHENSIVE METABOLIC PANEL W/ REFLEX TO MG FOR LOW K - Abnormal; Notable for the following components:    Glucose 105 (*)     BUN 56 (*)     Est, Glom Filt Rate 50 (*)     All other components within normal limits   MICROSCOPIC URINALYSIS - Abnormal; Notable for the following components:    Mucus, UA Rare (*)     All other components within normal limits     RADIOLOGY:    CT ABDOMEN PELVIS W IV CONTRAST Additional Contrast? None   Final Result   1. Stable size of a mass at the lower pole of the left kidney for greater   than 2 years. The prior noncontrast CT demonstrated subtle fat within the   tumor which cannot be confirmed on current exam following contrast.  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 Renal   cell carcinoma is less likely. 2. There is no surrounding inflammatory change. No retroperitoneal   adenopathy. 3. Stability of incidental left adrenal adenoma. ED COURSE:  Medications administered:  Medications   ketorolac (TORADOL) injection 15 mg (15 mg IntraVENous Given 1/13/23 1006)   iopamidol (ISOVUE-370) 76 % injection 100 mL (100 mLs IntraVENous Given 1/13/23 1040)     Vitals:    01/13/23 0929 01/13/23 1056   BP: (!) 160/70 (!) 178/54   Pulse: 59 50   Resp: 18 17   Temp: 97.7 °F (36.5 °C)    TempSrc: Oral    SpO2: 97% 100%   Weight: 151 lb 3.8 oz (68.6 kg)    Height: 5' 3\" (1.6 m)    History from : Patient  Limitations to history : None  Chronic Conditions:  has a past medical history of Atrial fibrillation ([address] Bleeding ulcer, Fibromyalgia, History of blood transfusion, Hyperlipidemia, Hypertension, Osteoporosis, RLS (restless legs syndrome), Shingles, and Stroke ([address] Discussion with Other Profesionals : Radiology  Social Determinants : None  Records Reviewed :  Outpatient Notes including office visits and U/s  Disposition Considerations (Tests not ordered but considered, Shared Decision Making, Pt Expectation of Test or Tx.):  Amylase considered but not ordered due to nonspecific nature of the test      PROCEDURES:  None    CRITICAL CARE:  None    CONSULTATIONS:  Isela Jones Alfreda Forrest is a 80 y.o. female who presented because of flank pain. She has tenderness to the left groin. It feels to be a inguinal hernia. CT abdomen pelvis shows a stable renal mass. That is not likely the source of her pain. I did discuss with the radiologist the CT scan findings. He informs me that fat in the inguinal canal is a very common CT finding and he does not specifically report on that because of the ubiquitous nature of this finding to avoid unnecessary referrals for people that are asymptomatic. On reviewing her imaging, she does have fat in the area of the inguinal canal which is likely causing her discomfort and it accounts for her clinical exam finding. She reports that when she sleeps on her left hip, she will notice it more in the morning and when she sleeps on the other side, it will feel better. I will refer her to general surgery. She apparently has had minimal symptoms with it over the past 6 months since she mentioned it to another one of her providers. She had an ultrasound of it at the beginning of July of last year. Based on history, physical exam, and testing my suspicion is low for bowel or biliary obstruction, cholangitis, perforated viscous, appendicitis, torsion, vascular occlusion or dissection, ACS, PE, incarcerated hernia involving bowel, AAA, amongst other emergent conditions. The renal mass has been stable and is felt to be benign. Troy Brunner was given appropriate discharge instructions. Referral to follow up provider. Discharge Medication List as of 1/13/2023 11:45 AM        START taking these medications    Details   HYDROcodone-acetaminophen (NORCO) 5-325 MG per tablet Take 1 tablet by mouth every 8 hours as needed for Pain for up to 8 doses.  Sedation precautions Max Daily Amount: 3 tablets, Disp-8 tablet, R-0Print           FOLLOW UP:    Orlando Everett [address] [place] [address]  [phone]    Schedule an appointment as soon as possible for a visit       Thalia Allen MD  [address] [facility] [id number] [phone]    Schedule an appointment as soon as possible for a visit       [facility]  [address]  [phone]  Go to   If symptoms worsen    FINAL IMPRESSION:    1. Left groin pain    2. Left flank pain        (Please note that I used voice recognition software to generate this note.   Occasionally words are mistranscribed despite my efforts to edit errors.)        Tomás Berry MD  01/13/23 [id number]

## 2023-01-13 NOTE — TELEPHONE ENCOUNTER
Called pt in regards to message below and she states she went to the ER this morning and got her CT done. Chief Complaint   Patient presents with     Recheck Medication        Schizophrenia, undifferentiated     Reviewed allergies, smoking status, and pharmacy preference  Administered abuse screening questions   Obtained weight, blood pressure and heart rate

## 2023-01-13 NOTE — TELEPHONE ENCOUNTER
Pt was in the [facility]  ER this AM and she has an appointment on 1/24/23 to follow up. Pt wants to know if she can be worked in sooner b/c she's concerned about having to go to the ER b/c she's in pain. Pl advise. Pt aware that provider is out of the office until Monday.

## 2023-01-15 ENCOUNTER — HOSPITAL ENCOUNTER (EMERGENCY)
Age: 83
Discharge: HOME | End: 2023-01-15
Attending: EMERGENCY MEDICINE
Payer: MEDICARE

## 2023-01-15 ENCOUNTER — TELEPHONE (OUTPATIENT)
Dept: FAMILY MEDICINE CLINIC | Age: 83
End: 2023-01-15

## 2023-01-15 VITALS
HEART RATE: 59 BPM | HEIGHT: 63 IN | WEIGHT: 149.69 LBS | SYSTOLIC BLOOD PRESSURE: 194 MMHG | RESPIRATION RATE: 16 BRPM | BODY MASS INDEX: 26.52 KG/M2 | TEMPERATURE: 98.1 F | DIASTOLIC BLOOD PRESSURE: 71 MMHG | OXYGEN SATURATION: 99 %

## 2023-01-15 DIAGNOSIS — N28.89 LEFT RENAL MASS: ICD-10-CM

## 2023-01-15 DIAGNOSIS — R10.9 LEFT FLANK PAIN: Primary | ICD-10-CM

## 2023-01-15 PROCEDURE — 99283 EMERGENCY DEPT VISIT LOW MDM: CPT

## 2023-01-15 RX ORDER — PREDNISONE 20 MG/1
40 TABLET ORAL DAILY
Qty: 10 TABLET | Refills: 0 | Status: SHIPPED | OUTPATIENT
Start: 2023-01-15 | End: 2023-01-20

## 2023-01-15 RX ORDER — ONDANSETRON 4 MG/1
4 TABLET, ORALLY DISINTEGRATING ORAL
Qty: 12 TABLET | Refills: 0 | Status: SHIPPED | OUTPATIENT
Start: 2023-01-15

## 2023-01-15 RX ORDER — OXYCODONE HYDROCHLORIDE AND ACETAMINOPHEN 5; 325 MG/1; MG/1
1 TABLET ORAL EVERY 6 HOURS PRN
Qty: 12 TABLET | Refills: 0 | Status: SHIPPED | OUTPATIENT
Start: 2023-01-15 | End: 2023-01-18

## 2023-01-15 ASSESSMENT — PAIN DESCRIPTION - FREQUENCY
FREQUENCY: CONTINUOUS
FREQUENCY: CONTINUOUS

## 2023-01-15 ASSESSMENT — PAIN DESCRIPTION - DESCRIPTORS: DESCRIPTORS: ACHING;SORE

## 2023-01-15 ASSESSMENT — PAIN SCALES - GENERAL
PAINLEVEL_OUTOF10: 9
PAINLEVEL_OUTOF10: 9

## 2023-01-15 ASSESSMENT — PAIN DESCRIPTION - LOCATION
LOCATION: BACK;FLANK
LOCATION: FLANK

## 2023-01-15 ASSESSMENT — PAIN DESCRIPTION - PAIN TYPE
TYPE: ACUTE PAIN
TYPE: ACUTE PAIN

## 2023-01-15 ASSESSMENT — PAIN DESCRIPTION - ORIENTATION
ORIENTATION: LEFT
ORIENTATION: POSTERIOR;LEFT

## 2023-01-15 ASSESSMENT — PAIN - FUNCTIONAL ASSESSMENT
PAIN_FUNCTIONAL_ASSESSMENT: 0-10
PAIN_FUNCTIONAL_ASSESSMENT: 0-10

## 2023-01-15 ASSESSMENT — PAIN DESCRIPTION - ONSET
ONSET: ON-GOING
ONSET: ON-GOING

## 2023-01-15 ASSESSMENT — PAIN DESCRIPTION - DIRECTION: RADIATING_TOWARDS: LEFT BUTTOCK CHEEK

## 2023-01-15 NOTE — ED TRIAGE NOTES
Patient to [facility] [address] ambulatory complains of left flank pain onset 2 days ago- states took pain pill @ 10 am with little relief- Here 2 days ago with lower abdominal pain but that has somewhat resolved- did feel some pain in car to lower abdomen when hitting a bump in the road- ate breakfast without nausea .  Denies pain or burning upon urination- pain increases to left back with certain movements

## 2023-01-15 NOTE — ED PROVIDER NOTES
[facility]  eMERGENCY dEPARTMENT eNCOUnter      Pt Name: Monica Garzon  MRN: [id number]  [place] 1940  Date of evaluation: 1/15/2023  Provider: Neo Hernandes MD    [address] [place] [address]       Chief Complaint   Patient presents with    Flank Pain     Left flank pain onset 2 days ago- denies pain or burning with urination- pain increases with movement         CRITICAL CARE TIME   Total Critical Care time was 0 minutes, excluding separately reportable procedures. There was a high probability of clinically significant/life threatening deterioration in the patient's condition which required my urgent intervention. HISTORY OF PRESENT ILLNESS  (Location/Symptom, Timing/Onset, Context/Setting, Quality, Duration, Modifying Factors, Severity.)   History From: Patient      Monica Garzon is a 80 y.o. female who presents to the emergency department complaining of pain in her left flank area. This patient was seen here 2 days ago for flank and inguinal pain. She was worked up including a CBC, chemistry profile, urinalysis, and a CT abdomen pelvis. She was noted to have what they described as a stable left renal mass as compared to a CT done on approximately 2 years ago. In the medical decision-making it was felt that this was unlikely the cause of her pain, they were more concerned about some fat in the left inguinal canal as a potential cause of a left inguinal hernia type pain. She states she did have some inguinal pain, but now her pain is in her left flank area. She has been using the Norco that was prescribed as not helping. The pain is worse. She is nauseated. It is nonradiating. Her only other pain is she has some soreness at the top of her left buttock and in her sacral area when she pushes on that area. It does not radiate down her leg. She has no falls or injuries. She states in retrospect she has had some back pain on and off for \"a while\".   She states in the past she would take Tylenol for it and it would get better. She called for an appointment with her primary care physician as scheduled on the 24th. Since her pain was worse she called to see if she can get in sooner and was told she could not get in any sooner. She has no other new symptoms. No abdominal pain. She does have nausea with the pain. No vomiting. No frequency urgency or dysuria. Nursing Notes were reviewed and I agree. SCREENINGS        [place] Coma Scale  Eye Opening: Spontaneous  Best Verbal Response: Oriented  Best Motor Response: Obeys commands  [place] Coma Scale Score: 15                CIWA Assessment  BP: (!) 194/71  Heart Rate: 59           REVIEW OF SYSTEMS    (2-9 systems for level 4, 10 or more for level 5)     HEENT: Negative. Cardiovascular: No chest pain. Pulmonary: No shortness of breath. GI: No abdominal pain. She has some nausea, no vomiting. No constipation or diarrhea. : No frequency urgency or dysuria. Left flank pain. Some soreness in her left lower back at the top of her left buttock. Worse when she pushes on that area. No radiation to the legs. Neuro: No extremity weakness numbness or tingling. Except as noted above the remainder of the review of systems was reviewed and negative.        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          SURGICAL HISTORY       Past Surgical History:   Procedure Laterality Date    BREAST SURGERY Left     lumpectomy    CARDIOVERSION      [facility]     COLONOSCOPY N/A 6/18/2019    COLONOSCOPY POLYPECTOMY COLD BIOPSY performed by Golden Estrada MD at [address] [place] [address] Place  9/07    scalp cyst excision    CYST REMOVAL      from left breast    HYSTERECTOMY, TOTAL ABDOMINAL (CERVIX REMOVED)      with bso     OVARIAN CYST REMOVAL Right     Age 24    UPPER GASTROINTESTINAL ENDOSCOPY N/A 1/16/2019    EGD ESOPHAGOGASTRODUODENOSCOPY performed by Tiff Dudley MD at [address] 6/18/2019    EGD BIOPSY performed by Tiff Dudley MD at [address]        Previous Medications    AMIODARONE (CORDARONE) 200 MG TABLET    Take 0.5 tablets by mouth daily    APIXABAN (ELIQUIS) 5 MG TABS TABLET    TAKE 1 TABLET BY MOUTH TWICE DAILY    ATORVASTATIN (LIPITOR) 20 MG TABLET    TAKE 1 TABLET BY MOUTH DAILY    CALCIUM-VITAMIN D (OSCAL) 250-125 MG-UNIT PER TABLET    Take 1 tablet by mouth 2 times daily. COENZYME Q10 (COQ10) 100 MG CAPS    Take 1 tablet by mouth daily     FUROSEMIDE (LASIX) 40 MG TABLET    TAKE 1 TABLET BY MOUTH EVERY DAY TAKE 1 EXTRA TABLET AS NEEDED IF WEIGHT GOES UP 2 POUNDS    HYDROCODONE-ACETAMINOPHEN (NORCO) 5-325 MG PER TABLET    Take 1 tablet by mouth every 8 hours as needed for Pain for up to 8 doses. Sedation precautions Max Daily Amount: 3 tablets    METOPROLOL TARTRATE (LOPRESSOR) 25 MG TABLET    TAKE 1 TABLET BY MOUTH TWICE DAILY    MULTIVITAMIN (THERAGRAN) PER TABLET    Take 1 tablet by mouth daily.       OMEGA-3 FATTY ACIDS (FISH OIL) 1000 MG CAPS    Take 3,000 mg by mouth daily    POTASSIUM CHLORIDE (KLOR-CON M) 20 MEQ EXTENDED RELEASE TABLET    Take 1 tablet by mouth daily       ALLERGIES     Lisinopril    FAMILY HISTORY       Family History   Problem Relation Age of Onset    Heart Disease Mother         CAD    Diabetes Mother     Other Mother         fall    Kidney Disease Mother     Diabetes Maternal Grandmother     High Blood Pressure Maternal Grandfather     No Known Problems Paternal Grandmother     No Known Problems Paternal Grandfather     Diabetes Maternal Aunt           SOCIAL HISTORY       Social History Socioeconomic History    Marital status:      Spouse name: None    Number of children: None    Years of education: None    Highest education level: None   Tobacco Use    Smoking status: Never    Smokeless tobacco: Never   Vaping Use    Vaping Use: Never used   Substance and Sexual Activity    Alcohol use: Not Currently    Drug use: No    Sexual activity: Not Currently     Partners: Male     Social Determinants of Health     Financial Resource Strain: Low Risk     Difficulty of Paying Living Expenses: Not hard at all   Food Insecurity: No Food Insecurity    Worried About [address] in the Last Year: Never true    [address] in the Last Year: Never true   Transportation Needs: No Transportation Needs    Lack of Transportation (Medical): No    Lack of Transportation (Non-Medical): No   Physical Activity: Unknown    Days of Exercise per Week: 0 days         PHYSICAL EXAM    (up to 7 for level 4, 8 or more for level 5)     ED Triage Vitals [01/15/23 1129]   BP Temp Temp Source Heart Rate Resp SpO2 Height Weight   (!) 194/71 98.1 °F (36.7 °C) Oral 59 16 99 % 5' 3\" (1.6 m) 149 lb 11.1 oz (67.9 kg)       General: Alert elderly female no acute distress. Head: Atraumatic and normocephalic. Eyes: No conjunctival injection. No pallor. Pupils equal round reactive. ENT: Garcia Prabhakar is clear. Oropharynx moist without erythema. Neck: Supple, nontender, no adenopathy. Heart: Regular rate and rhythm. No murmurs gallops noted. Lungs: Breath sounds equal bilaterally and clear. Abdomen: Soft, nondistended, nontender. No masses organomegaly. Bowel sounds are normal.  Musculoskeletal: No lumbar paraspinous tenderness. No midline lumbar thoracic tenderness. She has some minimal tenderness over the left SI joint and at the top of the left buttock. Range of motion is good with minimal apparent discomfort. No lower extremity edema. Intact range of motion of the hips knees and ankles. Skin: No rash.   Neuro: Negative straight leg raises bilaterally. Normal gait. 1+ symmetrical patellar and Achilles tendon reflexes. DIFFERENTIAL DIAGNOSIS   Differential includes but is not limited to muscular pain, pain secondary to her renal mass, ureterolithiasis, pyelonephritis, lumbar radiculopathy/sciatica, shingles, other. DIAGNOSTIC RESULTS     EKG: All EKG's are interpreted by Indiana Collier MD in the absence of a cardiologist.      RADIOLOGY:   Non-plain film images such as CT, Ultrasound and MRI are read by the radiologist. Plain radiographic images are visualized and preliminarily interpreted Indiana Collier MD with the below findings:      Interpretation per the Radiologist below, if available at the time of this note:    No orders to display         ED BEDSIDE ULTRASOUND:   Performed by ED Physician - none    LABS:  Labs Reviewed - No data to display    All other labs were within normal range or not returned as of this dictation. EMERGENCY DEPARTMENT COURSE and DIFFERENTIAL DIAGNOSIS/MDM:   Vitals:    Vitals:    01/15/23 1129   BP: (!) 194/71   Pulse: 59   Resp: 16   Temp: 98.1 °F (36.7 °C)   TempSrc: Oral   SpO2: 99%   Weight: 149 lb 11.1 oz (67.9 kg)   Height: 5' 3\" (1.6 m)       Patient was given the following medications:  Medications - No data to display          Is this patient to be included in the SEP-1 Core Measure due to severe sepsis or septic shock? No   Exclusion criteria - the patient is NOT to be included for SEP-1 Core Measure due to: Infection is not suspected    Chronic Conditions affecting care: Below   has a past medical history of Atrial fibrillation ([address] Bleeding ulcer (01/2019), Fibromyalgia, History of blood transfusion (01/2019), Hyperlipidemia, Hypertension, Osteoporosis, RLS (restless legs syndrome), Shingles, and Stroke ([address] (2004).     CONSULTS: (Who and What was discussed)  None        Records Reviewed (Source): ED records including visit from 1/13/2023. CC/HPI Summary, DDx, ED Course, and Reassessment: Ongoing left flank pain. Seen here 2 days ago for the same. Apparently at that time she was having some inguinal discomfort as well and there was concern that she might have some inguinal hernia. Apparently her CT did not show a definite inguinal hernia although upon review with the radiologist the ED physician said there was some patent inguinal canal.  She felt she was tender in that area and referred her to general surgery for follow-up. She not really having pain in that area now. Pain is mainly in her left flank. Its not being relieved by the pain medication. She has no other new symptoms. No  symptoms. No frequency urgency or dysuria. I reviewed the records with the patient. She states she was not aware that she ever had a mass on her left kidney. She is localizing the pain directly over her left flank. She has no radicular pain to suggest radiculopathy. She has no muscular tenderness or bony tenderness. She has no rash to suggest shingles. She has no mechanism of injury to excess suggest a bony injury. She has no neurologic symptoms to suggest radiculopathy. She has no  symptoms. I do not think any further work-up today is indicated, I think she was worked up thoroughly on her previous visit. Her pain is in the area of her left flank in the area of her left kidney. Review of her CT shows that they think it is likely a benign tumor. She was never aware that she ever had a tumor even though it was noted in February 2020. I did recommend that she see urology to have them evaluate her to see if they think whether or not this tumor could be the source of her pain. They are saying it is most likely benign based on the radiographic appearance, but I have no other explanation for her pain at this time. Review of her previous visit show she has no pyuria. No hematuria. She has no  symptoms.   I did tell her I would write for short-term pain medication for an additional 3 days along with some nausea medicine.  Because of the tenderness in her left lower back I did write for a short course of prednisone 40 mg daily for 5 days.  Urology referral was provided.  She will continue with her follow-up with her primary care physician.  She can follow-up with general surgery as previously recommended as well.    Disposition Considerations (tests considered but not done, Admit vs D/C, Shared Decision Making, Pt Expectation of Test or Tx.): She has stable vital signs.  She had a thorough work-up done 2 days ago with no significant change in her symptoms that would warrant further testing today.  She was provided symptomatic treatment as above.    Controlled Substance Monitoring:    Acute and Chronic Pain Monitoring:   RX Monitoring 1/15/2023   Periodic Controlled Substance Monitoring Possible medication side effects, risk of tolerance/dependence & alternative treatments discussed.;No signs of potential drug abuse or diversion identified.             I am the Primary Clinician of Record.      PROCEDURES:  None    FINAL IMPRESSION      1. Left flank pain    2. Left renal mass          DISPOSITION/PLAN   DISPOSITION Decision To Discharge 01/15/2023 11:51:37 AM      PATIENT REFERRED TO:  Cynthia Paris MD  [address] [address]  [phone]      As scheduled on 1/24    [facility] [address]  [phone]  Schedule an appointment as soon as possible for a visit in 1 week        DISCHARGE MEDICATIONS:  New Prescriptions    ONDANSETRON (ZOFRAN-ODT) 4 MG DISINTEGRATING TABLET    Take 1 tablet by mouth every 4-6 hours as needed for Nausea or Vomiting    OXYCODONE-ACETAMINOPHEN (PERCOCET) 5-325 MG PER TABLET    Take 1 tablet by mouth every 6 hours as needed for Pain for up to 3 days. Intended supply: 3 days. Take lowest dose possible to manage pain Max Daily Amount: 4  tablets    PREDNISONE (DELTASONE) 20 MG TABLET    Take 2 tablets by mouth daily for 5 days       (Please note that portions of this note were completed with a voice recognition program.  Efforts were made to edit the dictations but occasionally words are mis-transcribed.)    Eber Webb MD  Attending Emergency Physician        Tara Gamble MD  01/15/23 2040

## 2023-01-15 NOTE — DISCHARGE INSTRUCTIONS
Use pain and nausea medicine as needed for symptomatic relief. Take prednisone daily as prescribed. Call urology referral to evaluate your left renal mass to see if it may be a contributing factor to your flank pain. Keep your scheduled follow-up with your primary care provider on the 24th.

## 2023-01-16 NOTE — TELEPHONE ENCOUNTER
If she needs to get in earlier to schedule but probably better to get into urology sooner for the kidney and questionable fat ty groin lesion

## 2023-01-17 ENCOUNTER — CARE COORDINATION (OUTPATIENT)
Dept: CARE COORDINATION | Age: 83
End: 2023-01-17

## 2023-01-17 NOTE — CARE COORDINATION
cc ed outreach 1. Attempted to reach patient by phone. No answer. Left brief message with name, contact number and asked for return call back. Waiting for patient response at this time. Will update notes when callback is received. Will follow up at another date and time.

## 2023-01-17 NOTE — CARE COORDINATION
cc return call from ed fu. Patient declined care coordination at this time. Completed ed fu note. Has a follow up with pcp. [facility] ED Follow up Call    Reason for ED visit:  Flank pain  Status:     improved    Did you call your PCP prior to going to the ED?  no    Did you receive a discharge instructions from the Emergency Room? yes  Review of Instructions:     Understands what to report/when to return?:  yes   Understands discharge instructions?:  yes   Following discharge instructions?: yes    Are there any new complaints of pain? no  New Pain Meds? yes   Constipation prophylaxis needed? No, patient declined    If you have a wound is the dressing clean, dry, and intact? na  Understands wound care regimen? na    Are there any other complaints/concerns that you wish to tell your provider? no    FU appts/Provider:    Future Appointments   Date Time Provider Carlyle Parmar   1/24/2023  9:45 AM Flor Rees MD [facility]   4/24/2023 10:30 AM SCHEDULE, [place]  [address] [place] [facility]   4/28/2023  4:00 PM MD Elysia Medel [facility]           New Medications?:   yes     Medication Reconciliation by phone - yes  Understands Medications? yes  Taking Medications? yes  Can you swallow your pills? yes    Any further needs in the home i.e. Equipment?  no    Link to services in community?:  No, declined additional needs and declined care coordination.

## 2023-01-24 ENCOUNTER — OFFICE VISIT (OUTPATIENT)
Dept: FAMILY MEDICINE CLINIC | Age: 83
End: 2023-01-24
Payer: MEDICARE

## 2023-01-24 VITALS
TEMPERATURE: 98 F | OXYGEN SATURATION: 96 % | HEIGHT: 63 IN | DIASTOLIC BLOOD PRESSURE: 74 MMHG | HEART RATE: 55 BPM | SYSTOLIC BLOOD PRESSURE: 142 MMHG | WEIGHT: 150 LBS | BODY MASS INDEX: 26.58 KG/M2

## 2023-01-24 DIAGNOSIS — I48.0 PAF (PAROXYSMAL ATRIAL FIBRILLATION) (HCC): ICD-10-CM

## 2023-01-24 DIAGNOSIS — R41.840 CONCENTRATION DEFICIT: ICD-10-CM

## 2023-01-24 DIAGNOSIS — E86.0 DEHYDRATION: ICD-10-CM

## 2023-01-24 DIAGNOSIS — R10.9 LEFT FLANK PAIN: Primary | ICD-10-CM

## 2023-01-24 DIAGNOSIS — N28.89 LEFT RENAL MASS: ICD-10-CM

## 2023-01-24 DIAGNOSIS — I50.32 CHRONIC DIASTOLIC (CONGESTIVE) HEART FAILURE (HCC): ICD-10-CM

## 2023-01-24 PROCEDURE — G8427 DOCREV CUR MEDS BY ELIG CLIN: HCPCS | Performed by: INTERNAL MEDICINE

## 2023-01-24 PROCEDURE — 1090F PRES/ABSN URINE INCON ASSESS: CPT | Performed by: INTERNAL MEDICINE

## 2023-01-24 PROCEDURE — 3074F SYST BP LT 130 MM HG: CPT | Performed by: INTERNAL MEDICINE

## 2023-01-24 PROCEDURE — 3078F DIAST BP <80 MM HG: CPT | Performed by: INTERNAL MEDICINE

## 2023-01-24 PROCEDURE — 99214 OFFICE O/P EST MOD 30 MIN: CPT | Performed by: INTERNAL MEDICINE

## 2023-01-24 PROCEDURE — 1123F ACP DISCUSS/DSCN MKR DOCD: CPT | Performed by: INTERNAL MEDICINE

## 2023-01-24 PROCEDURE — G8417 CALC BMI ABV UP PARAM F/U: HCPCS | Performed by: INTERNAL MEDICINE

## 2023-01-24 PROCEDURE — G8484 FLU IMMUNIZE NO ADMIN: HCPCS | Performed by: INTERNAL MEDICINE

## 2023-01-24 PROCEDURE — 1036F TOBACCO NON-USER: CPT | Performed by: INTERNAL MEDICINE

## 2023-01-24 PROCEDURE — G8399 PT W/DXA RESULTS DOCUMENT: HCPCS | Performed by: INTERNAL MEDICINE

## 2023-01-24 NOTE — PROGRESS NOTES
SUBJECTIVE:  Luanne Zurita is a 80 y.o. female being evaluated for:    Chief Complaint   Patient presents with    Follow-up      Patient is here for ED followup today . HPI   Pain in left lower quadrant for  a while  and has subsided and now in the upper back   Off and on out of blue  Not related to motion urination or eating  No gi symptoms  No dysuria or blood in urine Only drinking 6  8 oz glasses a day with her heart  Given prednisone and percocet  Percocet takes takes the pain away quickly and has enough     Appointment with urologist later today    Daughter states mother wants tested for alzheimers   Can talk to daughter states will call her back and does not  Repeating herself     Allergies   Allergen Reactions    Lisinopril      cough     Current Outpatient Medications   Medication Sig Dispense Refill    ondansetron (ZOFRAN-ODT) 4 MG disintegrating tablet Take 1 tablet by mouth every 4-6 hours as needed for Nausea or Vomiting 12 tablet 0    apixaban (ELIQUIS) 5 MG TABS tablet TAKE 1 TABLET BY MOUTH TWICE DAILY 60 tablet 2    metoprolol tartrate (LOPRESSOR) 25 MG tablet TAKE 1 TABLET BY MOUTH TWICE DAILY 180 tablet 3    furosemide (LASIX) 40 MG tablet TAKE 1 TABLET BY MOUTH EVERY DAY TAKE 1 EXTRA TABLET AS NEEDED IF WEIGHT GOES UP 2 POUNDS 180 tablet 1    potassium chloride (KLOR-CON M) 20 MEQ extended release tablet Take 1 tablet by mouth daily 90 tablet 3    amiodarone (CORDARONE) 200 MG tablet Take 0.5 tablets by mouth daily 45 tablet 3    atorvastatin (LIPITOR) 20 MG tablet TAKE 1 TABLET BY MOUTH DAILY 90 tablet 0    Omega-3 Fatty Acids (FISH OIL) 1000 MG CAPS Take 3,000 mg by mouth daily      Coenzyme Q10 (COQ10) 100 MG CAPS Take 1 tablet by mouth daily       calcium-vitamin D (OSCAL) 250-125 MG-UNIT per tablet Take 1 tablet by mouth 2 times daily. multivitamin (THERAGRAN) per tablet Take 1 tablet by mouth daily. No current facility-administered medications for this visit. Social History     Socioeconomic History    Marital status:      Spouse name: Not on file    Number of children: Not on file    Years of education: Not on file    Highest education level: Not on file   Occupational History    Not on file   Tobacco Use    Smoking status: Never    Smokeless tobacco: Never   Vaping Use    Vaping Use: Never used   Substance and Sexual Activity    Alcohol use: Not Currently    Drug use: No    Sexual activity: Not Currently     Partners: Male   Other Topics Concern    Not on file   Social History Narrative    Not on file     Social Determinants of Health     Financial Resource Strain: Low Risk     Difficulty of Paying Living Expenses: Not hard at all   Food Insecurity: No Food Insecurity    Worried About Running Out of Food in the Last Year: Never true    [address] in the Last Year: Never true   Transportation Needs: No Transportation Needs    Lack of Transportation (Medical): No    Lack of Transportation (Non-Medical):  No   Physical Activity: Unknown    Days of Exercise per Week: 0 days    Minutes of Exercise per Session: Not on file   Stress: Not on file   Social Connections: Not on file   Intimate Partner Violence: Not on file   Housing Stability: Not on file      Past Medical History:   Diagnosis Date    Atrial fibrillation ([facility]     Bleeding ulcer 01/2019    Fibromyalgia     History of blood transfusion 01/2019    2 units    Hyperlipidemia     Hypertension     Osteoporosis     RLS (restless legs syndrome)     Shingles     Stroke ([facility] 2004    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      Past Surgical History:   Procedure Laterality Date    BREAST SURGERY Left     lumpectomy    CARDIOVERSION      [facility]     COLONOSCOPY N/A 6/18/2019    COLONOSCOPY POLYPECTOMY COLD BIOPSY performed by Donovan Gamez MD at [address] [place] [address] Place  9/07    scalp cyst excision    CYST REMOVAL      from left breast    HYSTERECTOMY, TOTAL ABDOMINAL (CERVIX REMOVED)      with bso     OVARIAN CYST REMOVAL Right     Age 24    UPPER GASTROINTESTINAL ENDOSCOPY N/A 1/16/2019    EGD ESOPHAGOGASTRODUODENOSCOPY performed by Humberto Cassidy MD at [address] N/A 6/18/2019    EGD BIOPSY performed by Humberto Cassidy MD at [address] [place] [address] of Systems   Constitutional:  Negative for activity change, chills and fever. HENT:  Negative for nosebleeds. Eyes:  Negative for visual disturbance. Respiratory:  Negative for cough and shortness of breath. Cardiovascular:  Negative for chest pain, palpitations and leg swelling. Gastrointestinal:  Positive for abdominal pain. Negative for blood in stool, constipation, diarrhea, nausea and vomiting. Genitourinary:  Positive for flank pain. Negative for dysuria and hematuria. Neurological:  Negative for dizziness, syncope, speech difficulty, weakness, light-headedness and numbness. Psychiatric/Behavioral:  Positive for decreased concentration. Negative for agitation and dysphoric mood. The patient is nervous/anxious. OBJECTIVE:  BP (!) 142/74 (Site: Left Upper Arm, Position: Sitting, Cuff Size: Medium Adult)   Pulse 55   Temp 98 °F (36.7 °C) (Temporal)   Ht 5' 3\" (1.6 m)   Wt 150 lb (68 kg)   LMP  (LMP Unknown)   SpO2 96%   BMI 26.57 kg/m²      Body mass index is 26.57 kg/m². Physical Exam  Constitutional:       General: She is not in acute distress. Appearance: Normal appearance. She is well-developed. HENT:      Head: Normocephalic and atraumatic. Eyes:      Conjunctiva/sclera: Conjunctivae normal.   Neck:      Thyroid: No thyromegaly. Vascular: No JVD. Cardiovascular:      Rate and Rhythm: Normal rate and regular rhythm. Heart sounds: Normal heart sounds. No murmur heard. No friction rub. No gallop. Pulmonary:      Effort: Pulmonary effort is normal.      Breath sounds: Normal breath sounds. Chest:      Chest wall: No tenderness. Abdominal:      General: There is no distension. Palpations: Abdomen is soft. There is no mass. Tenderness: There is no abdominal tenderness. There is no right CVA tenderness or left CVA tenderness. Musculoskeletal:         General: No swelling. Cervical back: Neck supple. Lymphadenopathy:      Cervical: No cervical adenopathy. Skin:     General: Skin is warm and dry. Neurological:      General: No focal deficit present. Mental Status: She is alert. Gait: Gait normal.      Comments: Normal recall and clock monitoring    Psychiatric:      Comments: Anxious        ASSESSMENT/PLAN:    Be Glez was seen today for follow-up. Diagnoses and all orders for this visit:    Left flank pain to follow  up with urology     Left renal mass    PAF (paroxysmal atrial fibrillation) (HCC) in sinus on NOAC     Chronic diastolic (congestive) heart failure (HCC) no heart failure     Dehydration  Comments:  increase water to 8 glasses a day     Concentration deficit  Comments:  discussed that memory testing is fair  Discussed anxiety and does not need / want any treatment       No orders of the defined types were placed in this encounter. Return in about 4 months (around 5/24/2023), or if symptoms worsen or fail to improve, for medicare wellness . There are no Patient Instructions on file for this visit.

## 2023-01-25 ASSESSMENT — ENCOUNTER SYMPTOMS
SHORTNESS OF BREATH: 0
NAUSEA: 0
CONSTIPATION: 0
COUGH: 0
ABDOMINAL PAIN: 1
VOMITING: 0
DIARRHEA: 0
BLOOD IN STOOL: 0

## 2023-02-02 ENCOUNTER — TELEPHONE (OUTPATIENT)
Dept: CARDIOLOGY CLINIC | Age: 83
End: 2023-02-02

## 2023-02-02 ENCOUNTER — OFFICE VISIT (OUTPATIENT)
Dept: SURGERY | Age: 83
End: 2023-02-02
Payer: MEDICARE

## 2023-02-02 VITALS
WEIGHT: 150 LBS | BODY MASS INDEX: 26.58 KG/M2 | DIASTOLIC BLOOD PRESSURE: 80 MMHG | SYSTOLIC BLOOD PRESSURE: 150 MMHG | HEIGHT: 63 IN

## 2023-02-02 DIAGNOSIS — K40.90 LEFT INGUINAL HERNIA: Primary | ICD-10-CM

## 2023-02-02 PROCEDURE — 1090F PRES/ABSN URINE INCON ASSESS: CPT | Performed by: SURGERY

## 2023-02-02 PROCEDURE — 1036F TOBACCO NON-USER: CPT | Performed by: SURGERY

## 2023-02-02 PROCEDURE — 1123F ACP DISCUSS/DSCN MKR DOCD: CPT | Performed by: SURGERY

## 2023-02-02 PROCEDURE — G8427 DOCREV CUR MEDS BY ELIG CLIN: HCPCS | Performed by: SURGERY

## 2023-02-02 PROCEDURE — 3079F DIAST BP 80-89 MM HG: CPT | Performed by: SURGERY

## 2023-02-02 PROCEDURE — G8484 FLU IMMUNIZE NO ADMIN: HCPCS | Performed by: SURGERY

## 2023-02-02 PROCEDURE — 99204 OFFICE O/P NEW MOD 45 MIN: CPT | Performed by: SURGERY

## 2023-02-02 PROCEDURE — G8417 CALC BMI ABV UP PARAM F/U: HCPCS | Performed by: SURGERY

## 2023-02-02 PROCEDURE — G8399 PT W/DXA RESULTS DOCUMENT: HCPCS | Performed by: SURGERY

## 2023-02-02 PROCEDURE — 3077F SYST BP >= 140 MM HG: CPT | Performed by: SURGERY

## 2023-02-02 ASSESSMENT — ENCOUNTER SYMPTOMS
ABDOMINAL PAIN: 1
ABDOMINAL DISTENTION: 1

## 2023-02-02 NOTE — TELEPHONE ENCOUNTER
Patient is cleared for surgery from cardiac standpoint.   Okay to hold her anticoagulation for 4 days prior to surgery as requested

## 2023-02-02 NOTE — LETTER
[address] [facility] Surgery  [address] [facility] [address]  [facility] [address]  Phone: [phone]  Fax: [phone]    Zoe Monet MD    February 2, 2023     Orlando Harmon [address] [place] [address]    Patient: Lyle Rodas   MR Number: [id number]   YOB: 1940   Date of Visit: 2/2/2023       Dear Raymundo Sol:    Thank you for referring Gavin Vang to me for evaluation/treatment. Below are the relevant portions of my assessment and plan of care. ASSESSMENT/PLAN:  1. Left inguinal hernia    Repair    Hold Eliquis preop    The risks, benefits and alternatives to the planned procedure were discussed. Patient expressed an understanding and is willing to proceed. If you have questions, please do not hesitate to call me. I look forward to following Butch Khanna along with you.     Sincerely,      Zoe Monet MD

## 2023-02-02 NOTE — PROGRESS NOTES
Elsworth Mcburney (:  1940) is a 80 y.o. female,New patient, here for evaluation of the following chief complaint(s):  New Patient (Pt is here today, referred by Dr Jefferson Gutierrez, for lower left pelvic mass. )         ASSESSMENT/PLAN:  1. Left inguinal hernia    Repair    Hold Eliquis preop    The risks, benefits and alternatives to the planned procedure were discussed. Patient expressed an understanding and is willing to proceed. Subjective   SUBJECTIVE/OBJECTIVE:  HPI  Chief Complaint: groin pain    Patient referred by Dr. Jefferson Gutierrez for evaluation of groin pain and a mass. Patient reports symptoms of bulging and pain at times. Location of symptoms is left groin. Symptoms were first noted a few weeks ago. Previous evaluation includes ER visit with CT which on review shows a fat containing left inguinal hernia. Patient has a history of hysterectomy. Will plan following treatment: hernia repair.     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        Past Surgical History:   Procedure Laterality Date    BREAST SURGERY Left     lumpectomy    CARD[facility]OVERSION      [facility]     COLONOSCOPY N/A 2019    COLONOSCOPY POLYPECTOMY COLD BIOPSY performed by Jt Shukla MD at [address] [place] [address]      scalp cyst excision    CYST REMOVAL      from left breast    HYSTERECTOMY, TOTAL ABDOMINAL (CERVIX REMOVED)      with bso     OVARIAN CYST REMOVAL Right     Age 24    UPPER GASTROINTESTINAL ENDOSCOPY N/A 2019    EGD ESOPHAGOGASTRODUODENOSCOPY performed by Jt Shukla MD at [address] [place] [address] N/A 2019    EGD BIOPSY performed by Hunter Razo MD at [facility]       Current Outpatient Medications   Medication Sig Dispense Refill    ondansetron (ZOFRAN-ODT) 4 MG disintegrating tablet Take 1 tablet by mouth every 4-6 hours as needed for Nausea or Vomiting 12 tablet 0    apixaban (ELIQUIS) 5 MG TABS tablet TAKE 1 TABLET BY MOUTH TWICE DAILY 60 tablet 2    metoprolol tartrate (LOPRESSOR) 25 MG tablet TAKE 1 TABLET BY MOUTH TWICE DAILY 180 tablet 3    furosemide (LASIX) 40 MG tablet TAKE 1 TABLET BY MOUTH EVERY DAY TAKE 1 EXTRA TABLET AS NEEDED IF WEIGHT GOES UP 2 POUNDS 180 tablet 1    potassium chloride (KLOR-CON M) 20 MEQ extended release tablet Take 1 tablet by mouth daily 90 tablet 3    amiodarone (CORDARONE) 200 MG tablet Take 0.5 tablets by mouth daily 45 tablet 3    atorvastatin (LIPITOR) 20 MG tablet TAKE 1 TABLET BY MOUTH DAILY 90 tablet 0    Omega-3 Fatty Acids (FISH OIL) 1000 MG CAPS Take 3,000 mg by mouth daily      Coenzyme Q10 (COQ10) 100 MG CAPS Take 1 tablet by mouth daily       calcium-vitamin D (OSCAL) 250-125 MG-UNIT per tablet Take 1 tablet by mouth 2 times daily. multivitamin (THERAGRAN) per tablet Take 1 tablet by mouth daily. No current facility-administered medications for this visit. Prior to Admission medications    Medication Sig Start Date End Date Taking?  Authorizing Provider   ondansetron (ZOFRAN-ODT) 4 MG disintegrating tablet Take 1 tablet by mouth every 4-6 hours as needed for Nausea or Vomiting 1/15/23  Yes Andrzej Brush MD   apixaban (ELIQUIS) 5 MG TABS tablet TAKE 1 TABLET BY MOUTH TWICE DAILY 10/28/22  Yes ZOILA Curiel NP   metoprolol tartrate (LOPRESSOR) 25 MG tablet TAKE 1 TABLET BY MOUTH TWICE DAILY 10/21/22  Yes lAicia Dutton MD   furosemide (LASIX) 40 MG tablet TAKE 1 TABLET BY MOUTH EVERY DAY TAKE 1 EXTRA TABLET AS NEEDED IF WEIGHT GOES UP 2 POUNDS 8/11/22  Yes ZOILA Curiel NP   potassium chloride (KLOR-CON M) 20 MEQ extended release tablet Take 1 tablet by mouth daily 3/18/22  Yes Chandrakant Turner MD   amiodarone (CORDARONE) 200 MG tablet Take 0.5 tablets by mouth daily 3/18/22  Yes Chandrakant Turner MD   atorvastatin (LIPITOR) 20 MG tablet TAKE 1 TABLET BY MOUTH DAILY 2/14/22  Yes Lurdes Ovalle MD   Omega-3 Fatty Acids (FISH OIL) 1000 MG CAPS Take 3,000 mg by mouth daily   Yes Historical Provider, MD   Coenzyme Q10 (COQ10) 100 MG CAPS Take 1 tablet by mouth daily    Yes Historical Provider, MD   calcium-vitamin D (OSCAL) 250-125 MG-UNIT per tablet Take 1 tablet by mouth 2 times daily. Yes Historical Provider, MD   multivitamin [facility]) per tablet Take 1 tablet by mouth daily. Yes Historical Provider, MD         Allergies   Allergen Reactions    Lisinopril      cough       Social History     Socioeconomic History    Marital status:      Spouse name: Not on file    Number of children: Not on file    Years of education: Not on file    Highest education level: Not on file   Occupational History    Not on file   Tobacco Use    Smoking status: Never    Smokeless tobacco: Never   Vaping Use    Vaping Use: Never used   Substance and Sexual Activity    Alcohol use: Not Currently    Drug use: No    Sexual activity: Not Currently     Partners: Male   Other Topics Concern    Not on file   Social History Narrative    Not on file     Social Determinants of Health     Financial Resource Strain: Low Risk     Difficulty of Paying Living Expenses: Not hard at all   Food Insecurity: No Food Insecurity    Worried About Running Out of Food in the Last Year: Never true    [address] in the Last Year: Never true   Transportation Needs: No Transportation Needs    Lack of Transportation (Medical): No    Lack of Transportation (Non-Medical):  No   Physical Activity: Unknown    Days of Exercise per Week: 0 days    Minutes of Exercise per Session: Not on file   Stress: Not on file   Social Connections: Not on file   Intimate Partner Violence: Not on file   Housing Stability: Not on file       Family History: reviewed and not contributory to current medical issues      Review of Systems   Gastrointestinal:  Positive for abdominal distention and abdominal pain. All other systems reviewed and are negative. Objective   Physical Exam  Constitutional:       Appearance: She is well-developed. HENT:      Head: Normocephalic and atraumatic. Neck:      Thyroid: No thyromegaly. Trachea: No tracheal deviation. Cardiovascular:      Rate and Rhythm: Normal rate. Heart sounds: Normal heart sounds. No murmur heard. Pulmonary:      Effort: Pulmonary effort is normal.      Breath sounds: Normal breath sounds. Abdominal:      General: There is no distension. Palpations: Abdomen is soft. Tenderness: There is no guarding. Hernia: A hernia (left groin) is present. Musculoskeletal:         General: Normal range of motion. Cervical back: Normal range of motion and neck supple. Skin:     General: Skin is warm and dry. Neurological:      Mental Status: She is alert and oriented to person, place, and time. Psychiatric:         Thought Content: Thought content normal.   Vitals    Last recorded: 02/02 1112     BP:  150/80 Abnormal            Electronically signed by Pete Cancino MD on 2/2/2023 at 11:31 AM        An electronic signature was used to authenticate this note.     --Pete Cancino MD

## 2023-02-02 NOTE — TELEPHONE ENCOUNTER
Dr. Rajeev Trujillo,     Please advise on cardiac risk assessment for patient with hx of Afib on Eliquis. Surgeon requesting she hold anti-coag for 4 days prior to left inguinal hernia repair on 2/17/23. Patient last seen in office 3/2022, thanks.

## 2023-02-02 NOTE — TELEPHONE ENCOUNTER
CARDIAC CLEARANCE     What type of procedure are you having? LEFT [address]    Which physician is performing your procedure? DR. Field James    When is your procedure scheduled for?  2/17/23    Where are you having this procedure? [facility]    Are you taking Blood Thinners? If so what? (Name/dose/frequesncy)  YES, RANDEE     Does the surgeon want you to stop your blood thinner? If so for how long?   YES, 4 DAYS    Phone Number and Contact Name for Physicians office:  [phone], Melina Bolton number to send information:    [phone]

## 2023-02-03 NOTE — TELEPHONE ENCOUNTER
Letter has been prepared  and Faxed , scanned to patient chart Lvm for patient to return call to office regarding instructions to hold eliquis ,Please be advise .

## 2023-02-07 NOTE — FLOWSHEET NOTE
opy of Covid results DOP/DOS  Preoperative Screening for Elective Surgery/Invasive Procedures While COVID-19 present in the community     Have you tested positive or have been told to self-isolate for COVID-19 like symptoms within the past 28 days? Do you currently have any of the following symptoms? Fever >100.0 F or 99.9 F in immunocompromised patients? New onset cough, shortness of breath or difficulty breathing? New onset sore throat, myalgia (muscle aches and pains), headache, loss of taste/smell or diarrhea? Have you had a potential exposure to COVID-19 within the past 14 days by:  Close contact with a confirmed case? Close contact with a healthcare worker,  or essential infrastructure worker (grocery store, [employer], gas station, public utilities or transportation)? Do you reside in a congregate setting such as; skilled nursing facility, adult home, correctional facility, homeless shelter or other institutional setting? Have you had recent travel to a known COVID-19 hotspot? No to all above     * Admitted with diarrhea? [] YES    [x]  NO     *Prior history of C-Diff. In last 3 months? [] YES    [x]  NO     *Antibiotic use in the past 6-8 weeks? [x]  NO    []  YES      If yes, which: REASON_________________     *Prior hospitalization or nursing home in the last month? []  YES    [x]  NO     SAFETY FIRST. .call before you fall    [address] [facility] time___2/17/23 0730_________        Surgery time__0900__________    Do not eat or drink anything after 12:00 midnight prior to your surgery. This includes water chewing gum, mints and ice chips- the Day of Surgery. You may brush your teeth and gargle the morning of your surgery, but do not swallow the water     Please see your family doctor/pediatrician for a history and physical and/or questions concerning medications.    Bring any test results/reports from your physicians office. If you are under the care of a heart doctor or specialist doctor, please be aware that you may be asked to them for clearance    You may be asked to stop blood thinners such as Coumadin, Plavix, Fragmin, Lovenox, etc., or any anti-inflammatories such as:  Aspirin, Ibuprofen, Advil, Naproxen prior to your surgery. We also ask that you stop any OTC medications such as fish oil, vitamin E, glucosamine, garlic, Multivitamins, COQ 10, etc.    We ask that you do not smoke 24 hours prior to surgery  We ask that you do not  drink any alcoholic beverages 24 hours prior to surgery     You must make arrangements for a responsible adult to take you home after your surgery. For your safety you will not be allowed to leave alone or drive yourself home. Your surgery will be cancelled if you do not have a ride home. Also for your safety, it is strongly suggested that someone stay with you the first 24 hours after your surgery. A parent or legal guardian must accompany a child scheduled for surgery and plan to stay at the hospital until the child is discharged. Please do not bring other children with you. For your comfort, please wear simple loose fitting clothing to the hospital.  Please do not bring valuables. Do not wear any make-up or nail polish on your fingers or toes. For your safety, please do not wear any jewelry or body piercing's on the day of surgery. All jewelry must be removed. If you have dentures, they will be removed before going to operating room. For your convenience, we will provide you with a container. If you wear contact lenses or glasses, they will be removed, please bring a case for them. If you have a living will and a durable power of  for healthcare, please bring in a copy.      As part of our patient safety program to minimize surgical site infections, we ask you to do the following:    Please notify your surgeon if you develop any illness between         now and the day of your surgery. This includes a cough, cold, fever, sore throat, nausea,         or vomiting, and diarrhea, etc.   Please notify your surgeon if you experience dizziness, shortness         of breath or blurred vision between now and the time of your surgery. Do not shave your operative site 96 hours prior to surgery. For face and neck surgery, men may use an electric razor 48 hours   prior to surgery. You may shower the night before surgery or the morning of   your surgery with an antibacterial soap. You will need to bring a photo ID and insurance card     If you use a C-pap or Bi-pap machine, please bring your machine with you to the hospital     Our goal is to provide you with excellent care, therefore, visitors will be limited to so that we may focus on providing this care for you. Please contact your surgeon office, if you have any further questions. [facility] phone number:  [phone] PAT fax number:  [phone]    Please note these are generalized instructions for all surgical cases, you may be provided with more specific instructions according to your surgery.

## 2023-02-10 ENCOUNTER — TELEPHONE (OUTPATIENT)
Dept: CARDIOLOGY CLINIC | Age: 83
End: 2023-02-10

## 2023-02-10 NOTE — TELEPHONE ENCOUNTER
Medication Refill    Medication needing refilled:  amiodarone (CORDARONE    Dosage of the medication:  200 MG tablet     How are you taking this medication (QD, BID, TID, QID, PRN):  Take 0.5 tablets by mouth daily    30 or 90 day supply called in:  45 tablet    Which Pharmacy are we sending the medication to?:    [facility]  [address] [address]   Phone:  [phone]  Fax:  [phone]

## 2023-02-13 RX ORDER — AMIODARONE HYDROCHLORIDE 200 MG/1
TABLET ORAL
Qty: 45 TABLET | Refills: 2 | Status: SHIPPED | OUTPATIENT
Start: 2023-02-13

## 2023-02-16 ENCOUNTER — ANESTHESIA EVENT (OUTPATIENT)
Dept: OPERATING ROOM | Age: 83
End: 2023-02-16
Payer: MEDICARE

## 2023-02-17 ENCOUNTER — HOSPITAL ENCOUNTER (OUTPATIENT)
Age: 83
Setting detail: OUTPATIENT SURGERY
Discharge: HOME | End: 2023-02-17
Attending: SURGERY | Admitting: SURGERY
Payer: MEDICARE

## 2023-02-17 ENCOUNTER — ANESTHESIA (OUTPATIENT)
Dept: OPERATING ROOM | Age: 83
End: 2023-02-17
Payer: MEDICARE

## 2023-02-17 VITALS
HEIGHT: 63 IN | HEART RATE: 53 BPM | BODY MASS INDEX: 26.72 KG/M2 | SYSTOLIC BLOOD PRESSURE: 176 MMHG | OXYGEN SATURATION: 98 % | DIASTOLIC BLOOD PRESSURE: 65 MMHG | WEIGHT: 150.79 LBS | TEMPERATURE: 97 F | RESPIRATION RATE: 16 BRPM

## 2023-02-17 DIAGNOSIS — K40.90 LEFT INGUINAL HERNIA: ICD-10-CM

## 2023-02-17 PROCEDURE — 3700000000 HC ANESTHESIA ATTENDED CARE: Performed by: SURGERY

## 2023-02-17 PROCEDURE — 6360000002 HC RX W HCPCS: Performed by: SURGERY

## 2023-02-17 PROCEDURE — 2580000003 HC RX 258: Performed by: SURGERY

## 2023-02-17 PROCEDURE — 3600000013 HC SURGERY LEVEL 3 ADDTL 15MIN: Performed by: SURGERY

## 2023-02-17 PROCEDURE — A4217 STERILE WATER/SALINE, 500 ML: HCPCS | Performed by: SURGERY

## 2023-02-17 PROCEDURE — 7100000010 HC PHASE II RECOVERY - FIRST 15 MIN: Performed by: SURGERY

## 2023-02-17 PROCEDURE — 6370000000 HC RX 637 (ALT 250 FOR IP): Performed by: ANESTHESIOLOGY

## 2023-02-17 PROCEDURE — C1781 MESH (IMPLANTABLE): HCPCS | Performed by: SURGERY

## 2023-02-17 PROCEDURE — 6360000002 HC RX W HCPCS: Performed by: NURSE ANESTHETIST, CERTIFIED REGISTERED

## 2023-02-17 PROCEDURE — 2500000003 HC RX 250 WO HCPCS: Performed by: NURSE ANESTHETIST, CERTIFIED REGISTERED

## 2023-02-17 PROCEDURE — 7100000000 HC PACU RECOVERY - FIRST 15 MIN: Performed by: SURGERY

## 2023-02-17 PROCEDURE — 3700000001 HC ADD 15 MINUTES (ANESTHESIA): Performed by: SURGERY

## 2023-02-17 PROCEDURE — 2580000003 HC RX 258: Performed by: ANESTHESIOLOGY

## 2023-02-17 PROCEDURE — 2709999900 HC NON-CHARGEABLE SUPPLY: Performed by: SURGERY

## 2023-02-17 PROCEDURE — 7100000001 HC PACU RECOVERY - ADDTL 15 MIN: Performed by: SURGERY

## 2023-02-17 PROCEDURE — 2500000003 HC RX 250 WO HCPCS: Performed by: SURGERY

## 2023-02-17 PROCEDURE — 7100000011 HC PHASE II RECOVERY - ADDTL 15 MIN: Performed by: SURGERY

## 2023-02-17 PROCEDURE — 3600000003 HC SURGERY LEVEL 3 BASE: Performed by: SURGERY

## 2023-02-17 DEVICE — MESH HERN W3XL6IN INGUINAL POLYPR MFIL RECTANG: Type: IMPLANTABLE DEVICE | Site: GROIN | Status: FUNCTIONAL

## 2023-02-17 RX ORDER — SODIUM CHLORIDE 0.9 % (FLUSH) 0.9 %
5-40 SYRINGE (ML) INJECTION EVERY 12 HOURS SCHEDULED
Status: DISCONTINUED | OUTPATIENT
Start: 2023-02-17 | End: 2023-02-17 | Stop reason: HOSPADM

## 2023-02-17 RX ORDER — PROPOFOL 10 MG/ML
INJECTION, EMULSION INTRAVENOUS PRN
Status: DISCONTINUED | OUTPATIENT
Start: 2023-02-17 | End: 2023-02-17 | Stop reason: SDUPTHER

## 2023-02-17 RX ORDER — OXYCODONE HYDROCHLORIDE 5 MG/1
5 TABLET ORAL EVERY 6 HOURS PRN
Qty: 28 TABLET | Refills: 0 | Status: SHIPPED | OUTPATIENT
Start: 2023-02-17 | End: 2023-02-24

## 2023-02-17 RX ORDER — FENTANYL CITRATE 50 UG/ML
INJECTION, SOLUTION INTRAMUSCULAR; INTRAVENOUS PRN
Status: DISCONTINUED | OUTPATIENT
Start: 2023-02-17 | End: 2023-02-17 | Stop reason: SDUPTHER

## 2023-02-17 RX ORDER — PROPOFOL 10 MG/ML
INJECTION, EMULSION INTRAVENOUS CONTINUOUS PRN
Status: DISCONTINUED | OUTPATIENT
Start: 2023-02-17 | End: 2023-02-17 | Stop reason: SDUPTHER

## 2023-02-17 RX ORDER — OXYCODONE HYDROCHLORIDE 5 MG/1
5 TABLET ORAL PRN
Status: COMPLETED | OUTPATIENT
Start: 2023-02-17 | End: 2023-02-17

## 2023-02-17 RX ORDER — MAGNESIUM HYDROXIDE 1200 MG/15ML
LIQUID ORAL CONTINUOUS PRN
Status: COMPLETED | OUTPATIENT
Start: 2023-02-17 | End: 2023-02-17

## 2023-02-17 RX ORDER — LIDOCAINE HYDROCHLORIDE 20 MG/ML
INJECTION, SOLUTION EPIDURAL; INFILTRATION; INTRACAUDAL; PERINEURAL PRN
Status: DISCONTINUED | OUTPATIENT
Start: 2023-02-17 | End: 2023-02-17 | Stop reason: SDUPTHER

## 2023-02-17 RX ORDER — ONDANSETRON 2 MG/ML
4 INJECTION INTRAMUSCULAR; INTRAVENOUS
Status: DISCONTINUED | OUTPATIENT
Start: 2023-02-17 | End: 2023-02-17 | Stop reason: HOSPADM

## 2023-02-17 RX ORDER — OXYCODONE HYDROCHLORIDE 10 MG/1
10 TABLET ORAL PRN
Status: COMPLETED | OUTPATIENT
Start: 2023-02-17 | End: 2023-02-17

## 2023-02-17 RX ORDER — SODIUM CHLORIDE 0.9 % (FLUSH) 0.9 %
5-40 SYRINGE (ML) INJECTION PRN
Status: DISCONTINUED | OUTPATIENT
Start: 2023-02-17 | End: 2023-02-17 | Stop reason: HOSPADM

## 2023-02-17 RX ORDER — SODIUM CHLORIDE 9 MG/ML
INJECTION, SOLUTION INTRAVENOUS PRN
Status: DISCONTINUED | OUTPATIENT
Start: 2023-02-17 | End: 2023-02-17 | Stop reason: HOSPADM

## 2023-02-17 RX ADMIN — OXYCODONE 5 MG: 5 TABLET ORAL at 12:31

## 2023-02-17 RX ADMIN — PROPOFOL 20 MG: 10 INJECTION, EMULSION INTRAVENOUS at 09:58

## 2023-02-17 RX ADMIN — SODIUM CHLORIDE: 9 INJECTION, SOLUTION INTRAVENOUS at 09:39

## 2023-02-17 RX ADMIN — FENTANYL CITRATE 25 MCG: 50 INJECTION INTRAMUSCULAR; INTRAVENOUS at 09:57

## 2023-02-17 RX ADMIN — CEFAZOLIN 2000 MG: 2 INJECTION, POWDER, FOR SOLUTION INTRAMUSCULAR; INTRAVENOUS at 09:53

## 2023-02-17 RX ADMIN — PROPOFOL 40 MG: 10 INJECTION, EMULSION INTRAVENOUS at 09:50

## 2023-02-17 RX ADMIN — SODIUM CHLORIDE: 9 INJECTION, SOLUTION INTRAVENOUS at 08:30

## 2023-02-17 RX ADMIN — FENTANYL CITRATE 25 MCG: 50 INJECTION INTRAMUSCULAR; INTRAVENOUS at 10:04

## 2023-02-17 RX ADMIN — PROPOFOL 20 MG: 10 INJECTION, EMULSION INTRAVENOUS at 09:52

## 2023-02-17 RX ADMIN — LIDOCAINE HYDROCHLORIDE 60 MG: 20 INJECTION, SOLUTION EPIDURAL; INFILTRATION; INTRACAUDAL; PERINEURAL at 09:50

## 2023-02-17 RX ADMIN — PROPOFOL 100 MCG/KG/MIN: 10 INJECTION, EMULSION INTRAVENOUS at 09:50

## 2023-02-17 ASSESSMENT — PAIN - FUNCTIONAL ASSESSMENT
PAIN_FUNCTIONAL_ASSESSMENT: 0-10
PAIN_FUNCTIONAL_ASSESSMENT: ACTIVITIES ARE NOT PREVENTED
PAIN_FUNCTIONAL_ASSESSMENT: PREVENTS OR INTERFERES SOME ACTIVE ACTIVITIES AND ADLS

## 2023-02-17 ASSESSMENT — PAIN DESCRIPTION - ONSET: ONSET: ON-GOING

## 2023-02-17 ASSESSMENT — PAIN SCALES - GENERAL
PAINLEVEL_OUTOF10: 4
PAINLEVEL_OUTOF10: 5

## 2023-02-17 ASSESSMENT — PAIN DESCRIPTION - LOCATION
LOCATION: ABDOMEN
LOCATION: ABDOMEN

## 2023-02-17 ASSESSMENT — PAIN DESCRIPTION - DESCRIPTORS
DESCRIPTORS: DISCOMFORT
DESCRIPTORS: DISCOMFORT

## 2023-02-17 ASSESSMENT — PAIN DESCRIPTION - FREQUENCY: FREQUENCY: CONTINUOUS

## 2023-02-17 ASSESSMENT — PAIN DESCRIPTION - PAIN TYPE: TYPE: SURGICAL PAIN

## 2023-02-17 ASSESSMENT — ENCOUNTER SYMPTOMS: SHORTNESS OF BREATH: 1

## 2023-02-17 ASSESSMENT — PAIN DESCRIPTION - ORIENTATION: ORIENTATION: LEFT;LOWER

## 2023-02-17 NOTE — ANESTHESIA PRE PROCEDURE
Department of Anesthesiology  Preprocedure Note       Name:  Merary Richmond   Age:  80 y.o.  :  1940                                          MRN:  [id number]         Date:  2023      Surgeon: Shmuel Green):  Kenrick Culp MD    Procedure: LEFT INGUINAL HERNIA REPAIR WITH MESH (Left: Groin)    Medications prior to admission:   Prior to Admission medications    Medication Sig Start Date End Date Taking? Authorizing Provider   amiodarone (CORDARONE) 200 MG tablet TAKE 1/2 TABLET BY MOUTH DAILY 23   Gurvinder Baires MD   apixaban (ELIQUIS) 5 MG TABS tablet TAKE 1 TABLET BY MOUTH TWICE DAILY 2/3/23   ZOILA Berrios NP   metoprolol tartrate (LOPRESSOR) 25 MG tablet TAKE 1 TABLET BY MOUTH TWICE DAILY 10/21/22   Gurvinder Baires MD   furosemide (LASIX) 40 MG tablet TAKE 1 TABLET BY MOUTH EVERY DAY TAKE 1 EXTRA TABLET AS NEEDED IF WEIGHT GOES UP 2 POUNDS 22   ZOILA Berrios NP   potassium chloride (KLOR-CON M) 20 MEQ extended release tablet Take 1 tablet by mouth daily 3/18/22   Pavan Mccabe MD   atorvastatin (LIPITOR) 20 MG tablet TAKE 1 TABLET BY MOUTH DAILY 22   Ralf Cline MD   Omega-3 Fatty Acids (FISH OIL) 1000 MG CAPS Take 3,000 mg by mouth daily    Historical Provider, MD   Coenzyme Q10 (COQ10) 100 MG CAPS Take 1 tablet by mouth daily     Historical Provider, MD   multivitamin [facility]) per tablet Take 1 tablet by mouth daily. Historical Provider, MD       Current medications:    No current facility-administered medications for this visit. No current outpatient medications on file.      Facility-Administered Medications Ordered in Other Visits   Medication Dose Route Frequency Provider Last Rate Last Admin    sodium chloride flush 0.9 % injection 5-40 mL  5-40 mL IntraVENous 2 times per day Leslie Talavera MD        sodium chloride flush 0.9 % injection 5-40 mL  5-40 mL IntraVENous PRN Leslie Talavera MD        0.9 % sodium chloride infusion IntraVENous PRN Yordy Johnson MD        ceFAZolin (ANCEF) 2,000 mg in sodium chloride 0.9 % 50 mL IVPB (mini-bag)  2,000 mg IntraVENous Once Maritza Cisneros MD           Allergies:     Allergies   Allergen Reactions    Lisinopril      cough       Problem List:    Patient Active Problem List   Diagnosis Code    Stroke ([facility] I63.9    Hyperlipidemia E78.5    Essential hypertension I10    Fibromyalgia M79.7    RLS (restless legs syndrome) G25.81    Prediabetes R73.03    Cellulitis of external ear H60.10    Osteopenia M85.80    Palpitations R00.2    Precordial pain R07.2    SOB (shortness of breath) R06.02    Closed nondisplaced fracture of distal phalanx of left thumb with routine healing S62.525D    Near syncope R55    SVT (supraventricular tachycardia) ([facility]) I47.1    Atrial fibrillation with RVR ([facility]) I48.91    Chronic atrial fibrillation ([facility]) I48.20    GI bleed K92.2    Acute blood loss anemia D62    Pneumonia J18.9    SIRS (systemic inflammatory response syndrome) ([facility]) R65.10    Acute on chronic diastolic heart failure ([facility]) I50.33    PUD (peptic ulcer disease) K27.9    PAF (paroxysmal atrial fibrillation) ([facility]) I48.0    History of CVA (cerebrovascular accident) Z80.78    Long term current use of amiodarone Z79.899       Past Medical History:        Diagnosis Date    Atrial fibrillation ([facility]     Bleeding ulcer 01/2019    Fibromyalgia     History of blood transfusion 01/2019    2 units    Hyperlipidemia     Hypertension     Inguinal hernia 02/2023    left    Osteoporosis     RLS (restless legs syndrome)     Shingles     Stroke ([facility] 2004    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        Past Surgical History:        Procedure Laterality Date    BREAST SURGERY Left     lumpectomy    [facility]     COLONOSCOPY N/A 06/18/2019    COLONOSCOPY POLYPECTOMY COLD BIOPSY performed by Link Govea MD at [facility] 09/2007    scalp cyst excision    CYST REMOVAL      from left breast    HYSTERECTOMY, TOTAL ABDOMINAL (CERVIX REMOVED)      with bso     OVARIAN CYST REMOVAL Right     Age 24    UPPER GASTROINTESTINAL ENDOSCOPY N/A 01/16/2019    EGD ESOPHAGOGASTRODUODENOSCOPY performed by Anabela Fatima MD [address] N/A 06/18/2019    EGD BIOPSY performed by Anabela Fatima MD at [facility] History:    Social History     Tobacco Use    Smoking status: Never    Smokeless tobacco: Never   Substance Use Topics    Alcohol use: Not Currently                                Counseling given: Not Answered      Vital Signs (Current): There were no vitals filed for this visit.                                            BP Readings from Last 3 Encounters:   02/02/23 (!) 150/80   01/24/23 (!) 142/74   01/15/23 (!) 194/71       NPO Status:                                                                                 BMI:   Wt Readings from Last 3 Encounters:   02/07/23 150 lb (68 kg)   02/02/23 150 lb (68 kg)   01/24/23 150 lb (68 kg)     There is no height or weight on file to calculate BMI.    CBC:   Lab Results   Component Value Date/Time    WBC 6.7 01/13/2023 10:00 AM    RBC 4.18 01/13/2023 10:00 AM    HGB 12.4 01/13/2023 10:00 AM    HCT 39.1 01/13/2023 10:00 AM    MCV 93.5 01/13/2023 10:00 AM    RDW 13.8 01/13/2023 10:00 AM     01/13/2023 10:00 AM       CMP:   Lab Results   Component Value Date/Time     01/13/2023 10:00 AM    K 4.1 01/13/2023 10:00 AM     01/13/2023 10:00 AM    CO2 29 01/13/2023 10:00 AM    BUN 56 01/13/2023 10:00 AM    CREATININE 1.1 01/13/2023 10:00 AM    GFRAA >60 03/26/2022 09:11 AM    GFRAA >60 04/13/2013 09:43 AM    AGRATIO 1.6 01/13/2023 10:00 AM    LABGLOM 50 01/13/2023 10:00 AM    GLUCOSE 105 01/13/2023 10:00 AM    PROT 7.0 01/13/2023 10:00 AM    PROT 6.6 10/13/2012 06:59 AM    CALCIUM 9.5 01/13/2023 10:00 AM    BILITOT 0.3 01/13/2023 10:00 AM    ALKPHOS 68 01/13/2023 10:00 AM    AST 17 01/13/2023 10:00 AM    ALT 18 01/13/2023 10:00 AM       POC Tests: No results for input(s): POCGLU, POCNA, POCK, POCCL, POCBUN, POCHEMO, POCHCT in the last 72 hours. Coags:   Lab Results   Component Value Date/Time    PROTIME 19.3 01/15/2019 07:10 AM    INR 1.69 01/15/2019 07:10 AM    APTT 29.0 01/15/2019 07:10 AM       HCG (If Applicable): No results found for: PREGTESTUR, PREGSERUM, HCG, HCGQUANT     ABGs: No results found for: PHART, PO2ART, AGN0LME, UIE0XMK, BEART, Z3AHYGPN     Type & Screen (If Applicable):  No results found for: LABABO, [address]    Anesthesia Evaluation  Patient summary reviewed no history of anesthetic complications:   Airway: Mallampati: II  TM distance: >3 FB   Neck ROM: full  Mouth opening: > = 3 FB   Dental:          Pulmonary:   (+) shortness of breath:      (-) COPD and asthma                           Cardiovascular:  Exercise tolerance: good (>4 METS),   (+) hypertension:, dysrhythmias: atrial fibrillation, hyperlipidemia    (-) past MI and CAD                Neuro/Psych:   (+) CVA: residual symptoms, neuromuscular disease:,    (-) seizures and TIA           GI/Hepatic/Renal:        (-) GERD, hepatitis, liver disease and no renal disease       Endo/Other:        (-) hypothyroidism               Abdominal:             Vascular:     - DVT and PE. Other Findings:             Anesthesia Plan      MAC     ASA 3     (I discussed intravenous sedation to the patient's satisfaction including risks and alternatives. The patient agreed with the plan and has no further questions. Sarah Bautista MD )  Induction: intravenous. Anesthetic plan and risks discussed with patient and child/children. Plan discussed with CRNA. DOS STAFF ADDENDUM:    Pt seen and examined, chart reviewed (including anesthesia, drug and allergy history). No interval changes to history and physical examination.   Anesthetic plan, risks, benefits, alternatives, and personnel involved discussed with patient. Patient verbalized an understanding and agrees to proceed.       Chata Renee MD  February 17, 2023  8:16 Pauline Berry MD   2/17/2023

## 2023-02-17 NOTE — ANESTHESIA POSTPROCEDURE EVALUATION
Department of Anesthesiology  Postprocedure Note    Patient: Merary Richmond  MRN: [id number]  YOB: 1940  Date of evaluation: 2/17/2023      Procedure Summary     Date: 02/17/23 Room / Location: [address]    Anesthesia Start: 1776 Anesthesia Stop: 9418    Procedure: LEFT INGUINAL HERNIA REPAIR WITH MESH (Left: Groin) Diagnosis:       Left inguinal hernia      (LEFT INGUINAL HERNIA)    Surgeons: Kenrick Culp MD Responsible Provider: Scot King MD    Anesthesia Type: MAC ASA Status: 3          Anesthesia Type: No value filed.     Jessa Phase I: Jessa Score: 10    Jessa Phase II: Jessa Score: 10      Anesthesia Post Evaluation    Patient location during evaluation: PACU  Level of consciousness: awake and alert  Airway patency: patent  Nausea & Vomiting: no nausea and no vomiting  Complications: no  Cardiovascular status: blood pressure returned to baseline  Respiratory status: acceptable  Hydration status: euvolemic  Comments: Postoperative Anesthesia Note    Name:    Merary Richmond  MRN:      [id number]    Patient Vitals in the past 12 hrs:  02/17/23 1155, BP:(!) 170/58, Temp:97 °F (36.1 °C), Temp src:Temporal, Pulse:51, Resp:16, SpO2:97 %  02/17/23 1140, BP:(!) 168/79, Pulse:(!) 47, Resp:13, SpO2:100 %  02/17/23 1121, BP:(!) 179/45, Temp:97.1 °F (36.2 °C), Pulse:(!) 46, Resp:14  02/17/23 1120, Pulse:(!) 46, Resp:18  02/17/23 1115, BP:(!) 179/45, Temp:97 °F (36.1 °C), Pulse:50, Resp:19, SpO2:99 %  02/17/23 1100, BP:(!) 155/63, Pulse:(!) 46, Resp:18, SpO2:100 %  02/17/23 1055, BP:(!) 170/45, Pulse:(!) 46, Resp:12, SpO2:100 %  02/17/23 1050, BP:(!) 154/49, Pulse:(!) 49, Resp:22, SpO2:100 %  02/17/23 1039, BP:(!) 112/42, Temp:97.1 °F (36.2 °C), Temp src:Temporal, Pulse:(!) 49, Resp:14, SpO2:100 %  02/17/23 0816, BP:(!) 154/64, Temp:97.2 °F (36.2 °C), Temp src:Temporal, Pulse:56, Resp:16, SpO2:98 %, Height:5' 3\" (1.6 m), Weight:150 lb 12.7 oz (68.4 kg)     LABS:    CBC  Lab Results       Component                Value               Date/Time                  WBC                      6.7                 01/13/2023 10:00 AM        HGB                      12.4                01/13/2023 10:00 AM        HCT                      39.1                01/13/2023 10:00 AM        PLT                      184                 01/13/2023 10:00 AM   RENAL  Lab Results       Component                Value               Date/Time                  NA                       141                 01/13/2023 10:00 AM        K                        4.1                 01/13/2023 10:00 AM        CL                       101                 01/13/2023 10:00 AM        CO2                      29                  01/13/2023 10:00 AM        BUN                      56 (H)              01/13/2023 10:00 AM        CREATININE               1.1                 01/13/2023 10:00 AM        GLUCOSE                  105 (H)             01/13/2023 10:00 AM   COAGS  Lab Results       Component                Value               Date/Time                  PROTIME                  19.3 (H)            01/15/2019 07:10 AM        INR                      1.69 (H)            01/15/2019 07:10 AM        APTT                     29.0                01/15/2019 07:10 AM     Intake & Output:  @67IDMY@    Nausea & Vomiting:  No    Level of Consciousness:  Awake    Pain Assessment:  Adequate analgesia    Anesthesia Complications:  No apparent anesthetic complications    SUMMARY      Vital signs stable  OK to discharge from Stage I post anesthesia care.   Care transferred from Anesthesiology department on discharge from perioperative area

## 2023-02-17 NOTE — DISCHARGE INSTRUCTIONS
Follow up in 2-3 weeks  Call [phone] for an appointment  Remove dressings in 3-4 days  Ok to shower in AM  No Driving for 4 days. OK to drive at that time if you are not taking any pain medication.

## 2023-02-17 NOTE — PROGRESS NOTES
Pt voided. Back to room and sitting up in chair. VSS. Pain decreased to 3/10 in abdomen. Discharge instructions reviewed with pt and daughter. Both express an understanding of instructions. Pt dressed with daughter's assistance. Wheeled to daughter's car for discharge.

## 2023-02-17 NOTE — H&P
Preoperative History and Physical Update    H&P from 2/2/2023 was reviewed    No changes noted today    PE  Alert and oriented  Left inguinal hernia    A/P  Left inguinal hernia  For repair today    The risks, benefits and alternatives to the planned procedure were discussed. Patient expressed an understanding and is willing to proceed.     Electronically signed by Nader Wooten MD on 2/17/2023 at 9:47 AM

## 2023-02-17 NOTE — PROGRESS NOTES
Clarified with Dr. Sangita Jaffe that pt can resume Eliquis on Sunday and Colace is recommended stool softener.

## 2023-02-17 NOTE — PROGRESS NOTES
Report received from [facility]. Pt tolerates PO. Up to chair. VSS. Pain 4/10 in abdomen. Oxycodone 1 tablet PO given for pain. Daughter present.

## 2023-02-17 NOTE — PROGRESS NOTES
Pt to phase 2 in stable condition. No c/o surgical pain or nausea. Pt states ready to go to next phase of care. Pt also has a little left scapula pain which she relates to muscle pain - warm blanket applied.

## 2023-02-17 NOTE — PROGRESS NOTES
From PACU. Alert and oriented. C/o 4/10 surgical abd discomfort. Denies shoulder pain at this time. Dressing is clean dry intact. Abd soft.  Vss.

## 2023-02-18 NOTE — OP NOTE
[address] Jihan Mcfarlane 16                                OPERATIVE REPORT    PATIENT NAME: Adryan Zhao                  :        1940  MED REC NO:   [id number]                          ROOM:  ACCOUNT NO:   [de-identified]                           ADMIT DATE: 2023  PROVIDER:     Kael Gamez MD    DATE OF PROCEDURE:  2023    PREOPERATIVE DIAGNOSIS:  Left inguinal hernia. POSTOPERATIVE DIAGNOSIS:  Left inguinal hernia. OPERATION PERFORMED:  Repair of left inguinal hernia. SURGEON:  Kael Gamez MD    SPECIMEN:  None. ESTIMATED BLOOD LOSS:  Less than 50 mL. COMPLICATIONS:  None. DISPOSITION:  To Recovery in stable condition. INDICATION:  The patient is an 66-year-old female with an enlarging and  now painful left inguinal hernia. The risks, benefits, alternatives of  repair were reviewed and she agreed to proceed. OPERATIVE PROCEDURE:  The patient was brought to the operating room,  placed supine, anesthesia delivered, and the left groin prepped and  draped in a sterile fashion. Local anesthetic was infused and an  oblique incision made over the inguinal canal.  Dissection was carried  through the subcutaneous tissue to the external oblique which was opened  through the external ring. Dissection was carried down to the pubic  tubercle and the round ligament identified. This was dissected free  with electrocautery and divided. We now mobilized back toward the  internal ring where an obvious hernia was encountered. The hernia  contents were dissected free from the surrounding inguinal canal and  reduced back through the internal ring which was then imbricated with a  running 2-0 Vicryl suture. A Marlex mesh was placed on the floor of the  inguinal canal and anchored at the pubic tubercle with a 2-0 PDS.   That  suture was run from medial to lateral along the shelving edge of the  inguinal ligament until lateral to the internal ring.  The medial edge  of the mesh was anchored with interrupted 2-0 PDS suture.  The oblique  was now closed with a 2-0 Vicryl and the skin closed with 3-0 and 4-0  Vicryl.  Dressings were applied and the patient transferred to Recovery  in good condition.        BRENT YIP MD    D: 02/17/2023 13:32:22       T: 02/17/2023 13:35:54     [email or web address]  Job#: [id number]     Doc#: [id number]    CC:

## 2023-02-26 DIAGNOSIS — I63.30 CEREBROVASCULAR ACCIDENT (CVA) DUE TO THROMBOSIS OF CEREBRAL ARTERY (HCC): ICD-10-CM

## 2023-02-26 DIAGNOSIS — E78.2 MIXED HYPERLIPIDEMIA: ICD-10-CM

## 2023-03-01 RX ORDER — ATORVASTATIN CALCIUM 20 MG/1
20 TABLET, FILM COATED ORAL DAILY
Qty: 90 TABLET | Refills: 0 | Status: SHIPPED | OUTPATIENT
Start: 2023-03-01

## 2023-03-01 NOTE — TELEPHONE ENCOUNTER
Last OV: 9/23/22  Next OV:  5/5/23  Last refill: 2/14/22  #90  Most recent Labs:  1/13/23  Last EKG (if needed): 9/23/22

## 2023-03-02 ENCOUNTER — OFFICE VISIT (OUTPATIENT)
Dept: SURGERY | Age: 83
End: 2023-03-02

## 2023-03-02 DIAGNOSIS — K40.90 LEFT INGUINAL HERNIA: Primary | ICD-10-CM

## 2023-03-02 PROCEDURE — 99024 POSTOP FOLLOW-UP VISIT: CPT | Performed by: SURGERY

## 2023-03-02 NOTE — PROGRESS NOTES
Ricardo Real (:  1940) is a 80 y.o. female,Established patient, here for evaluation of the following chief complaint(s):  Post-Op Check (Pt is here today for a postop left inguinal hernia repair. )         ASSESSMENT/PLAN:  1. Left inguinal hernia      Follow up with me as needed           Subjective   SUBJECTIVE/OBJECTIVE:  HPI  Patient presents s/p repair of left inguinal hernia. Patient is two weeks post op. Pain level is minor and improving. Incision appearance: well healed. Post op complications: none. Follow up with me as needed. Review of Systems       Objective   Physical Exam       Electronically signed by Vazquez Zamudio MD on 3/2/2023 at 10:54 AM      An electronic signature was used to authenticate this note.     --Vazquez Zamudio MD

## 2023-03-02 NOTE — LETTER
March 2, 2023       Jin Niño YOB: 1940   [address] [place] [id number] Date of Visit:  3/2/2023       To Whom It May Concern: It is my medical opinion that Zoran Doty may return to work on 03/06/23 with the following restrictions: lifting/carrying not to exceed 15 lbs. Restrictions lifted 03/31/23. If you have any questions or concerns, please don't hesitate to call.     Sincerely,        Kori Kauffman MD

## 2023-03-13 RX ORDER — POTASSIUM CHLORIDE 20 MEQ/1
20 TABLET, EXTENDED RELEASE ORAL DAILY
Qty: 90 TABLET | Refills: 3 | Status: SHIPPED | OUTPATIENT
Start: 2023-03-13

## 2023-03-13 NOTE — TELEPHONE ENCOUNTER
Last OV:  9/23/22  Next OV: 5/5/23  Last refill:  3/8/22  #90  3 R/F  Most recent Labs: 1/13/23  Last EKG (if needed): 9/23/22    PFT scheduled 4/27/23

## 2023-04-21 ENCOUNTER — HOSPITAL ENCOUNTER (EMERGENCY)
Age: 83
Discharge: HOME | End: 2023-04-21
Attending: EMERGENCY MEDICINE
Payer: MEDICARE

## 2023-04-21 VITALS
BODY MASS INDEX: 27.93 KG/M2 | SYSTOLIC BLOOD PRESSURE: 160 MMHG | TEMPERATURE: 98.4 F | HEART RATE: 84 BPM | HEIGHT: 63 IN | OXYGEN SATURATION: 97 % | DIASTOLIC BLOOD PRESSURE: 80 MMHG | RESPIRATION RATE: 18 BRPM | WEIGHT: 157.63 LBS

## 2023-04-21 DIAGNOSIS — J06.9 UPPER RESPIRATORY TRACT INFECTION, UNSPECIFIED TYPE: Primary | ICD-10-CM

## 2023-04-21 LAB — S PYO AG THROAT QL: NEGATIVE

## 2023-04-21 PROCEDURE — 99283 EMERGENCY DEPT VISIT LOW MDM: CPT

## 2023-04-21 PROCEDURE — 87880 STREP A ASSAY W/OPTIC: CPT

## 2023-04-21 PROCEDURE — 87081 CULTURE SCREEN ONLY: CPT

## 2023-04-21 RX ORDER — PSEUDOEPHEDRINE HCL 120 MG/1
120 TABLET, FILM COATED, EXTENDED RELEASE ORAL EVERY 12 HOURS
Qty: 20 TABLET | Refills: 0 | Status: SHIPPED | OUTPATIENT
Start: 2023-04-21 | End: 2023-04-24

## 2023-04-21 RX ORDER — GUAIFENESIN AND DEXTROMETHORPHAN HYDROBROMIDE 600; 30 MG/1; MG/1
1 TABLET, EXTENDED RELEASE ORAL 2 TIMES DAILY
Qty: 14 TABLET | Refills: 0 | Status: SHIPPED | OUTPATIENT
Start: 2023-04-21 | End: 2023-04-24

## 2023-04-21 ASSESSMENT — PAIN DESCRIPTION - LOCATION: LOCATION: THROAT

## 2023-04-21 ASSESSMENT — PAIN DESCRIPTION - DESCRIPTORS: DESCRIPTORS: DISCOMFORT

## 2023-04-21 ASSESSMENT — LIFESTYLE VARIABLES
HOW MANY STANDARD DRINKS CONTAINING ALCOHOL DO YOU HAVE ON A TYPICAL DAY: PATIENT DOES NOT DRINK
HOW OFTEN DO YOU HAVE A DRINK CONTAINING ALCOHOL: NEVER

## 2023-04-21 ASSESSMENT — PAIN SCALES - GENERAL: PAINLEVEL_OUTOF10: 7

## 2023-04-21 ASSESSMENT — PAIN - FUNCTIONAL ASSESSMENT: PAIN_FUNCTIONAL_ASSESSMENT: 0-10

## 2023-04-22 NOTE — ED PROVIDER NOTES
temperature 98.4 °F (36.9 °C), temperature source Tympanic, resp. rate 18, height 5' 3\" (1.6 m), weight 157 lb 10.1 oz (71.5 kg), SpO2 97 %, not currently breastfeeding.       Follow-up with:  Orlando Bronson [address] [place] [address]  [phone]    In 1 week  If symptoms worsen          Hafsa Coronado MD  04/21/23 2010

## 2023-04-23 LAB — S PYO THROAT QL CULT: NORMAL

## 2023-04-24 ENCOUNTER — OFFICE VISIT (OUTPATIENT)
Dept: FAMILY MEDICINE CLINIC | Age: 83
End: 2023-04-24

## 2023-04-24 VITALS
DIASTOLIC BLOOD PRESSURE: 80 MMHG | HEIGHT: 63 IN | OXYGEN SATURATION: 93 % | WEIGHT: 157 LBS | HEART RATE: 72 BPM | TEMPERATURE: 97.9 F | SYSTOLIC BLOOD PRESSURE: 147 MMHG | BODY MASS INDEX: 27.82 KG/M2

## 2023-04-24 DIAGNOSIS — I10 ESSENTIAL HYPERTENSION: ICD-10-CM

## 2023-04-24 DIAGNOSIS — H66.001 ACUTE SUPPURATIVE OTITIS MEDIA OF RIGHT EAR WITHOUT SPONTANEOUS RUPTURE OF TYMPANIC MEMBRANE, RECURRENCE NOT SPECIFIED: ICD-10-CM

## 2023-04-24 DIAGNOSIS — J20.9 ACUTE BRONCHITIS, UNSPECIFIED ORGANISM: Primary | ICD-10-CM

## 2023-04-24 RX ORDER — DOXYCYCLINE HYCLATE 100 MG
100 TABLET ORAL 2 TIMES DAILY
Qty: 20 TABLET | Refills: 0 | Status: SHIPPED | OUTPATIENT
Start: 2023-04-24 | End: 2023-05-04

## 2023-04-24 RX ORDER — PREDNISONE 20 MG/1
TABLET ORAL
Qty: 11 TABLET | Refills: 0 | Status: SHIPPED | OUTPATIENT
Start: 2023-04-24

## 2023-04-24 RX ORDER — GUAIFENESIN AND CODEINE PHOSPHATE 100; 10 MG/5ML; MG/5ML
5 SOLUTION ORAL 4 TIMES DAILY PRN
Qty: 118 ML | Refills: 0 | Status: SHIPPED | OUTPATIENT
Start: 2023-04-24 | End: 2023-05-01

## 2023-04-24 SDOH — ECONOMIC STABILITY: HOUSING INSECURITY
IN THE LAST 12 MONTHS, WAS THERE A TIME WHEN YOU DID NOT HAVE A STEADY PLACE TO SLEEP OR SLEPT IN A SHELTER (INCLUDING NOW)?: NO

## 2023-04-24 SDOH — ECONOMIC STABILITY: FOOD INSECURITY: WITHIN THE PAST 12 MONTHS, THE FOOD YOU BOUGHT JUST DIDN'T LAST AND YOU DIDN'T HAVE MONEY TO GET MORE.: NEVER TRUE

## 2023-04-24 SDOH — ECONOMIC STABILITY: INCOME INSECURITY: HOW HARD IS IT FOR YOU TO PAY FOR THE VERY BASICS LIKE FOOD, HOUSING, MEDICAL CARE, AND HEATING?: NOT HARD AT ALL

## 2023-04-24 SDOH — ECONOMIC STABILITY: FOOD INSECURITY: WITHIN THE PAST 12 MONTHS, YOU WORRIED THAT YOUR FOOD WOULD RUN OUT BEFORE YOU GOT MONEY TO BUY MORE.: NEVER TRUE

## 2023-04-24 NOTE — PROGRESS NOTES
SUBJECTIVE:  Lyle Rodas is a 80 y.o. female being evaluated for:    Chief Complaint   Patient presents with    Cough      Patient is having cough and sore thoart and congestion on chest , was seen at ER ON 04/21/20223 . HPI   Symptoms started last week    Sore throat,  right ear pain, sinus congestion,  clear drainage. NO eye symptoms   Coughing  yellow sputum   Trouble getting it up  now white and foamy  Wheezing and sob at nigiht  No chest pain or heart racing  Chest hurts form coughing   NO fevers or chills      Went to the ed on Friday and concerned her bp was up   Told to take otc meds mucinex   Worsening  coughing all night  increasing sob and wheezing,     Allergies   Allergen Reactions    Lisinopril      cough     Current Outpatient Medications   Medication Sig Dispense Refill    doxycycline hyclate (VIBRA-TABS) 100 MG tablet Take 1 tablet by mouth 2 times daily for 10 days 20 tablet 0    predniSONE (DELTASONE) 20 MG tablet 2 pills daily for 3 days, 1 pill daily for 3 days and 1/2 daily for 3 days 11 tablet 0    guaiFENesin-codeine (TUSSI-ORGANIDIN NR) 100-10 MG/5ML syrup Take 5 mLs by mouth 4 times daily as needed for Cough for up to 7 days.  Max Daily Amount: 20 mLs 118 mL 0    potassium chloride (KLOR-CON M) 20 MEQ extended release tablet TAKE 1 TABLET BY MOUTH DAILY 90 tablet 3    atorvastatin (LIPITOR) 20 MG tablet TAKE 1 TABLET BY MOUTH DAILY 90 tablet 0    amiodarone (CORDARONE) 200 MG tablet TAKE 1/2 TABLET BY MOUTH DAILY 45 tablet 2    apixaban (ELIQUIS) 5 MG TABS tablet TAKE 1 TABLET BY MOUTH TWICE DAILY 60 tablet 2    metoprolol tartrate (LOPRESSOR) 25 MG tablet TAKE 1 TABLET BY MOUTH TWICE DAILY 180 tablet 3    furosemide (LASIX) 40 MG tablet TAKE 1 TABLET BY MOUTH EVERY DAY TAKE 1 EXTRA TABLET AS NEEDED IF WEIGHT GOES UP 2 POUNDS 180 tablet 1    Omega-3 Fatty Acids (FISH OIL) 1000 MG CAPS Take 3 capsules by mouth daily      Coenzyme Q10 (COQ10) 100 MG CAPS Take 1 tablet by mouth

## 2023-04-25 ENCOUNTER — CARE COORDINATION (OUTPATIENT)
Dept: CARE COORDINATION | Age: 83
End: 2023-04-25

## 2023-04-25 SDOH — ECONOMIC STABILITY: HOUSING INSECURITY: IN THE LAST 12 MONTHS, HOW MANY PLACES HAVE YOU LIVED?: 1

## 2023-04-25 SDOH — ECONOMIC STABILITY: INCOME INSECURITY: IN THE LAST 12 MONTHS, WAS THERE A TIME WHEN YOU WERE NOT ABLE TO PAY THE MORTGAGE OR RENT ON TIME?: NO

## 2023-04-25 SDOH — HEALTH STABILITY: PHYSICAL HEALTH: ON AVERAGE, HOW MANY DAYS PER WEEK DO YOU ENGAGE IN MODERATE TO STRENUOUS EXERCISE (LIKE A BRISK WALK)?: 4 DAYS

## 2023-04-25 SDOH — HEALTH STABILITY: PHYSICAL HEALTH: ON AVERAGE, HOW MANY MINUTES DO YOU ENGAGE IN EXERCISE AT THIS LEVEL?: 40 MIN

## 2023-04-25 ASSESSMENT — SOCIAL DETERMINANTS OF HEALTH (SDOH)
HOW HARD IS IT FOR YOU TO PAY FOR THE VERY BASICS LIKE FOOD, HOUSING, MEDICAL CARE, AND HEATING?: NOT HARD AT ALL
HOW OFTEN DO YOU ATTEND CHURCH OR RELIGIOUS SERVICES?: 1 TO 4 TIMES PER YEAR
IN A TYPICAL WEEK, HOW MANY TIMES DO YOU TALK ON THE PHONE WITH FAMILY, FRIENDS, OR NEIGHBORS?: MORE THAN THREE TIMES A WEEK
DO YOU BELONG TO ANY CLUBS OR ORGANIZATIONS SUCH AS CHURCH GROUPS UNIONS, FRATERNAL OR ATHLETIC GROUPS, OR SCHOOL GROUPS?: NO
HOW OFTEN DO YOU ATTENT MEETINGS OF THE CLUB OR ORGANIZATION YOU BELONG TO?: NEVER
HOW OFTEN DO YOU GET TOGETHER WITH FRIENDS OR RELATIVES?: MORE THAN THREE TIMES A WEEK

## 2023-04-25 NOTE — CARE COORDINATION
patient's well-being? (include current or anticipated unemployment, work, caregiving, access to transportation or other): Some general dissatisfaction but no concern   How would you rate their social network (family, work, friends)?: Good participation with social networks   How would you rate their financial resources (including ability to afford all required medical care)?: Financially secure, resources adequate, no identified problems   How wells does the patient now understand their health and well-being (symptoms, signs or risk factors) and what they need to do to manage their health?: Little understanding which impacts on their ability to undertake better management   How well do you think your patient can engage in healthcare discussions? (Barriers include language, deafness, aphasia, alcohol or drug problems, learning difficulties, concentration): Clear and open communication, no identified barriers   Do other services need to be involved to help this patient?: Other care/services not in place and required   Are current services involved with this patient well-coordinated? (Include coordination with other services you are now recommendation): Required care/services missing and/or fragmented   Suggested Interventions and Community Resources   Other Services or Interventions: 4/25/23 NOT ELIGIBLE SW,RD,OR RPM [place]   Zone Management Tools:  In Process         Set up/Review Goals, Set up/Review an Education Plan              Future Appointments   Date Time Provider Carlyle Parmar   5/24/2023  3:45 PM Brendan Chaves MD [facility] [place] - DYD   6/1/2023 10:30 AM SCHEDULE, SEBASTIAN PFT  1 [facility] PFT [facility]   6/9/2023  3:30 PM MD Alexandru Matamoros [facility]     ,   Congestive Heart Failure Assessment    Are you currently restricting fluids?: 2000cc (Comment: no added salt)  Do you understand a low sodium diet?: Yes (Comment: given instruction today.  )  Do you understand how to read food labels?:

## 2023-04-26 ASSESSMENT — ENCOUNTER SYMPTOMS
SHORTNESS OF BREATH: 1
RHINORRHEA: 1
VOMITING: 0
CHEST TIGHTNESS: 1
ABDOMINAL PAIN: 0
NAUSEA: 0
DIARRHEA: 0
COUGH: 1
WHEEZING: 1

## 2023-05-16 ENCOUNTER — CARE COORDINATION (OUTPATIENT)
Dept: CASE MANAGEMENT | Age: 83
End: 2023-05-16

## 2023-05-16 NOTE — CARE COORDINATION
LPN CC attempted to reach patient for care coordination outreach. Unable to reach. Unable to leave . Message states Jomar Pastrana is temporarily unavailable. \"   Atiya Motta  [facility]  Care Transitions  [phone]

## 2023-05-23 ENCOUNTER — CARE COORDINATION (OUTPATIENT)
Dept: CARE COORDINATION | Age: 83
End: 2023-05-23

## 2023-05-23 DIAGNOSIS — I63.30 CEREBROVASCULAR ACCIDENT (CVA) DUE TO THROMBOSIS OF CEREBRAL ARTERY (HCC): ICD-10-CM

## 2023-05-23 DIAGNOSIS — E78.2 MIXED HYPERLIPIDEMIA: ICD-10-CM

## 2023-05-23 RX ORDER — ATORVASTATIN CALCIUM 20 MG/1
20 TABLET, FILM COATED ORAL DAILY
Qty: 90 TABLET | Refills: 0 | Status: SHIPPED | OUTPATIENT
Start: 2023-05-23

## 2023-05-23 NOTE — CARE COORDINATION
Attempted to reach patient by phone. No answer. Left brief message with name, contact number and asked for return call back. Waiting for patient response at this time. Will update notes when callback is received. Will follow up at another date and time. [facility] colby in  sent.

## 2023-05-23 NOTE — TELEPHONE ENCOUNTER
Last OV: 9/23/22  Next OV: 6/9/23  Last refill: 3/1/23 #90  Most recent Labs: 1/13/23  Last EKG (if needed): 9/23/22

## 2023-05-23 NOTE — TELEPHONE ENCOUNTER
Please call patient to make sure she completes her fasting labs as ordered per Dr Shana Walden 9/23/22 and then again with Dr. Tomasz Winston on 1/11/2023 prior to her upcoming f/u. Thanks.

## 2023-05-31 NOTE — PATIENT INSTRUCTIONS
Left inguinal hernia repair scheduled for 2/17/23 at 9 arrive at 7:30. Nothing to eat or drink after midnight. You will need someone to bring you home. Pt followed by Dr Sánchez

## 2023-06-01 ENCOUNTER — HOSPITAL ENCOUNTER (OUTPATIENT)
Dept: PULMONOLOGY | Age: 83
Discharge: HOME | End: 2023-06-01
Payer: MEDICARE

## 2023-06-01 DIAGNOSIS — Z79.899 LONG TERM CURRENT USE OF AMIODARONE: ICD-10-CM

## 2023-06-01 DIAGNOSIS — I48.0 PAF (PAROXYSMAL ATRIAL FIBRILLATION) (HCC): ICD-10-CM

## 2023-06-01 LAB
DLCO %PRED: 121 %
DLCO PRED: NORMAL
DLCO/VA %PRED: NORMAL
DLCO/VA PRED: NORMAL
DLCO/VA: NORMAL
DLCO: NORMAL
EXPIRATORY TIME-POST: NORMAL
EXPIRATORY TIME: NORMAL
FEF 25-75% %CHNG: NORMAL
FEF 25-75% %PRED-POST: NORMAL
FEF 25-75% %PRED-PRE: NORMAL
FEF 25-75% PRED: NORMAL
FEF 25-75%-POST: NORMAL
FEF 25-75%-PRE: NORMAL
FEV1 %PRED-POST: NORMAL
FEV1 %PRED-PRE: 96 %
FEV1 PRED: NORMAL
FEV1-POST: NORMAL
FEV1-PRE: NORMAL
FEV1/FVC %PRED-POST: NORMAL
FEV1/FVC %PRED-PRE: NORMAL
FEV1/FVC PRED: NORMAL
FEV1/FVC-POST: NORMAL
FEV1/FVC-PRE: NORMAL
FVC %PRED-POST: NORMAL
FVC %PRED-PRE: 95 %
FVC PRED: NORMAL
FVC-POST: NORMAL
FVC-PRE: NORMAL
GAW %PRED: NORMAL
GAW PRED: NORMAL
GAW: NORMAL
IC %PRED: NORMAL
IC PRED: NORMAL
IC: NORMAL
MEP: NORMAL
MIP: NORMAL
MVV %PRED-PRE: NORMAL
MVV PRED: NORMAL
MVV-PRE: NORMAL
PEF %PRED-POST: NORMAL
PEF %PRED-PRE: NORMAL
PEF PRED: NORMAL
PEF%CHNG: NORMAL
PEF-POST: NORMAL
PEF-PRE: NORMAL
RAW %PRED: NORMAL
RAW PRED: NORMAL
RAW: NORMAL
RV %PRED: NORMAL
RV PRED: NORMAL
RV: NORMAL
SVC %PRED: NORMAL
SVC PRED: NORMAL
SVC: NORMAL
TLC %PRED: 88 %
TLC PRED: NORMAL
TLC: NORMAL
VA %PRED: NORMAL
VA PRED: NORMAL
VA: NORMAL
VTG %PRED: NORMAL
VTG PRED: NORMAL
VTG: NORMAL

## 2023-06-01 PROCEDURE — 94010 BREATHING CAPACITY TEST: CPT

## 2023-06-01 PROCEDURE — 94726 PLETHYSMOGRAPHY LUNG VOLUMES: CPT

## 2023-06-01 PROCEDURE — 94729 DIFFUSING CAPACITY: CPT

## 2023-06-01 PROCEDURE — 94726 PLETHYSMOGRAPHY LUNG VOLUMES: CPT | Performed by: INTERNAL MEDICINE

## 2023-06-01 PROCEDURE — 94729 DIFFUSING CAPACITY: CPT | Performed by: INTERNAL MEDICINE

## 2023-06-01 PROCEDURE — 94010 BREATHING CAPACITY TEST: CPT | Performed by: INTERNAL MEDICINE

## 2023-06-01 ASSESSMENT — PULMONARY FUNCTION TESTS
FVC_PERCENT_PREDICTED_PRE: 95
FEV1_PERCENT_PREDICTED_PRE: 96

## 2023-06-01 NOTE — PROCEDURES
Reason for PFT:  P. Afib. Long term use of amiodarone    Spirometry  Spirometry is normal.    Lung Volumes  Lung volumes are normal.    Flow-Volume Loop  The flow-volume loop is normal.    Diffusing Capacity  Diffusing capacity is normal.    Overall Interpretation  No obstruction is present    No restriction is present    Diffusion capacity is normal    FEV1 is 1.65 L at 96% predicted. FVC is 2.12 L at 95% predicted    FEV1/FVC ratio is 78    Normal study. No evidence of an ILD related to amiodarone administration         Obstruction severity and Restriction Severity using FEV1 %  Gold Stage Severity per ERS/ATS FEV1 % per ERS/ATS   GOLD I:  FEV1>80% Mild COPD >70   GOLD II:  FEV1 50-79% Moderate 50-70   GOLD III:  FEV1 30-49% Severe 35-50   GOLD IV:  FEV1 <30 Very Severe  <35       DCLO:  Normal:  %  Mild:  65-70%  Moderate:  41-60%  Severe:  <40%    PFT data will be scanned into the media tab in epic.     Deana Andrews [facility] Pulmonology

## 2023-06-02 DIAGNOSIS — R10.32 LLQ PAIN: ICD-10-CM

## 2023-06-02 DIAGNOSIS — R73.9 HYPERGLYCEMIA: ICD-10-CM

## 2023-06-02 DIAGNOSIS — Z79.899 LONG TERM CURRENT USE OF AMIODARONE: ICD-10-CM

## 2023-06-02 DIAGNOSIS — E78.00 HYPERCHOLESTEREMIA: ICD-10-CM

## 2023-06-02 DIAGNOSIS — I48.0 PAF (PAROXYSMAL ATRIAL FIBRILLATION) (HCC): ICD-10-CM

## 2023-06-02 LAB
ALBUMIN SERPL-MCNC: 4.2 G/DL (ref 3.4–5)
ALBUMIN/GLOB SERPL: 2.1 {RATIO} (ref 1.1–2.2)
ALP SERPL-CCNC: 56 U/L (ref 40–129)
ALT SERPL-CCNC: 12 U/L (ref 10–40)
ANION GAP SERPL CALCULATED.3IONS-SCNC: 10 MMOL/L (ref 3–16)
AST SERPL-CCNC: 15 U/L (ref 15–37)
BASOPHILS # BLD: 0.1 K/UL (ref 0–0.2)
BASOPHILS NFR BLD: 1 %
BILIRUB SERPL-MCNC: 0.3 MG/DL (ref 0–1)
BUN SERPL-MCNC: 37 MG/DL (ref 7–20)
CALCIUM SERPL-MCNC: 9 MG/DL (ref 8.3–10.6)
CHLORIDE SERPL-SCNC: 103 MMOL/L (ref 99–110)
CHOLEST SERPL-MCNC: 201 MG/DL (ref 0–199)
CO2 SERPL-SCNC: 29 MMOL/L (ref 21–32)
CREAT SERPL-MCNC: 0.9 MG/DL (ref 0.6–1.2)
DEPRECATED RDW RBC AUTO: 14.6 % (ref 12.4–15.4)
EOSINOPHIL # BLD: 0.3 K/UL (ref 0–0.6)
EOSINOPHIL NFR BLD: 5.1 %
GFR SERPLBLD CREATININE-BSD FMLA CKD-EPI: >60 ML/MIN/{1.73_M2}
GLUCOSE SERPL-MCNC: 93 MG/DL (ref 70–99)
HCT VFR BLD AUTO: 36.1 % (ref 36–48)
HDLC SERPL-MCNC: 64 MG/DL (ref 40–60)
HGB BLD-MCNC: 12 G/DL (ref 12–16)
LDLC SERPL CALC-MCNC: 119 MG/DL
LYMPHOCYTES # BLD: 2 K/UL (ref 1–5.1)
LYMPHOCYTES NFR BLD: 32.3 %
MCH RBC QN AUTO: 30.1 PG (ref 26–34)
MCHC RBC AUTO-ENTMCNC: 33.3 G/DL (ref 31–36)
MCV RBC AUTO: 90.5 FL (ref 80–100)
MONOCYTES # BLD: 0.4 K/UL (ref 0–1.3)
MONOCYTES NFR BLD: 5.9 %
NEUTROPHILS # BLD: 3.4 K/UL (ref 1.7–7.7)
NEUTROPHILS NFR BLD: 55.7 %
PLATELET # BLD AUTO: 174 K/UL (ref 135–450)
PMV BLD AUTO: 11 FL (ref 5–10.5)
POTASSIUM SERPL-SCNC: 4.5 MMOL/L (ref 3.5–5.1)
PROT SERPL-MCNC: 6.2 G/DL (ref 6.4–8.2)
RBC # BLD AUTO: 3.98 M/UL (ref 4–5.2)
SODIUM SERPL-SCNC: 142 MMOL/L (ref 136–145)
T4 FREE SERPL-MCNC: 0.8 NG/DL (ref 0.9–1.8)
TRIGL SERPL-MCNC: 90 MG/DL (ref 0–150)
TSH SERPL DL<=0.005 MIU/L-ACNC: 9 UIU/ML (ref 0.27–4.2)
VLDLC SERPL CALC-MCNC: 18 MG/DL
WBC # BLD AUTO: 6.1 K/UL (ref 4–11)

## 2023-06-03 LAB
EST. AVERAGE GLUCOSE BLD GHB EST-MCNC: 119.8 MG/DL
HBA1C MFR BLD: 5.8 %

## 2023-06-08 NOTE — PROGRESS NOTES
[facility] [place]        Cardiology Progress Note     Jessica Shen  1940 June 9, 2023    CC: \"I have no heart symptoms. \"     HPI:  The patient is 80 y.o. female with a past medical history significant for AFIB, SR with LBBB, HLD, HTN, borderline diabetic, and a past stroke. Today, she reports she is doing well and feeling well. She continues to work 50 hours per week. Patient denies exertional chest pain/pressure, dyspnea at rest, SANTANA, PND, orthopnea, palpitations, lightheadedness, weight changes, changes in LE edema, and syncope. She admits to medical therapy compliance and tolerating. She denies abnormal bruising or bleeding. She completed routine labs and is upset about her abnormal lipid profile as she admits to dietary indiscretions and since finding out the results is working on diet. She is now going to the gym most days of the week.      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      Past Surgical History:   Procedure Laterality Date    BREAST SURGERY Left     lumpectomy    CARD[facility]ERSION  2019    [facility]     COLONOSCOPY N/A 06/18/2019    COLONOSCOPY POLYPECTOMY COLD BIOPSY performed by Brittney Tee MD at [address] [place] [address]  09/2007    scalp cyst excision    CYST REMOVAL      from left breast    HERNIA REPAIR Left 2/17/2023    LEFT INGUINAL HERNIA REPAIR WITH MESH performed by Alvaro Saxena MD at [facility], [address] (CERVIX REMOVED)      with bso     OVARIAN CYST REMOVAL Right     Age 24    UPPER GASTROINTESTINAL ENDOSCOPY N/A 01/16/2019    EGD ESOPHAGOGASTRODUODENOSCOPY performed by Brittney Tee MD

## 2023-06-09 ENCOUNTER — CARE COORDINATION (OUTPATIENT)
Dept: CARE COORDINATION | Age: 83
End: 2023-06-09

## 2023-06-09 ENCOUNTER — OFFICE VISIT (OUTPATIENT)
Dept: CARDIOLOGY CLINIC | Age: 83
End: 2023-06-09

## 2023-06-09 VITALS
SYSTOLIC BLOOD PRESSURE: 126 MMHG | BODY MASS INDEX: 26.58 KG/M2 | HEIGHT: 63 IN | HEART RATE: 62 BPM | DIASTOLIC BLOOD PRESSURE: 64 MMHG | OXYGEN SATURATION: 97 % | WEIGHT: 150 LBS

## 2023-06-09 DIAGNOSIS — M79.89 LEG SWELLING: ICD-10-CM

## 2023-06-09 DIAGNOSIS — R94.6 ABNORMAL THYROID EXAM: ICD-10-CM

## 2023-06-09 DIAGNOSIS — R00.1 BRADYCARDIA: ICD-10-CM

## 2023-06-09 DIAGNOSIS — I48.0 PAF (PAROXYSMAL ATRIAL FIBRILLATION) (HCC): Primary | ICD-10-CM

## 2023-06-09 DIAGNOSIS — I10 ESSENTIAL HYPERTENSION: ICD-10-CM

## 2023-06-09 DIAGNOSIS — E78.5 HYPERLIPIDEMIA, UNSPECIFIED HYPERLIPIDEMIA TYPE: ICD-10-CM

## 2023-06-09 NOTE — PATIENT INSTRUCTIONS
1) Repeat thyroid labs in 2 months-orders given  2) Repeat fasting liver/lipid panel to check cholesterol and liver function in 3 months. 3) Keep working on a heart healthy diet, low fat, cholesterol, sodium, carbohydrate  4) Keep active and exercise most days of the week. 5) We will call with all blood work results and any instructions. 6) Plan routine f/u between 7-8 months.

## 2023-06-09 NOTE — CARE COORDINATION
cc fu  utr letter sent. Resolved episode. Will remain available if future needs arise or desire to participate in care coordination.

## 2023-07-05 ENCOUNTER — OFFICE VISIT (OUTPATIENT)
Dept: FAMILY MEDICINE CLINIC | Age: 83
End: 2023-07-05

## 2023-07-05 VITALS
DIASTOLIC BLOOD PRESSURE: 50 MMHG | WEIGHT: 148 LBS | HEIGHT: 63 IN | BODY MASS INDEX: 26.22 KG/M2 | OXYGEN SATURATION: 98 % | SYSTOLIC BLOOD PRESSURE: 122 MMHG | RESPIRATION RATE: 16 BRPM | HEART RATE: 64 BPM

## 2023-07-05 DIAGNOSIS — K63.5 SESSILE COLONIC POLYP: ICD-10-CM

## 2023-07-05 DIAGNOSIS — R00.1 BRADYCARDIA: ICD-10-CM

## 2023-07-05 DIAGNOSIS — I48.0 PAF (PAROXYSMAL ATRIAL FIBRILLATION) (HCC): ICD-10-CM

## 2023-07-05 DIAGNOSIS — I50.32 CHRONIC DIASTOLIC (CONGESTIVE) HEART FAILURE (HCC): ICD-10-CM

## 2023-07-05 DIAGNOSIS — Z00.00 MEDICARE ANNUAL WELLNESS VISIT, SUBSEQUENT: Primary | ICD-10-CM

## 2023-07-05 DIAGNOSIS — R94.6 ABNORMAL THYROID EXAM: ICD-10-CM

## 2023-07-05 DIAGNOSIS — I10 ESSENTIAL HYPERTENSION: ICD-10-CM

## 2023-07-05 LAB
T4 FREE SERPL-MCNC: 0.9 NG/DL (ref 0.9–1.8)
TSH SERPL DL<=0.005 MIU/L-ACNC: 6.35 UIU/ML (ref 0.27–4.2)

## 2023-07-05 RX ORDER — FUROSEMIDE 40 MG/1
TABLET ORAL
Qty: 180 TABLET | Refills: 1 | Status: SHIPPED | OUTPATIENT
Start: 2023-07-05

## 2023-07-05 ASSESSMENT — ENCOUNTER SYMPTOMS
ABDOMINAL PAIN: 0
SHORTNESS OF BREATH: 0
COUGH: 0
DIARRHEA: 0
CONSTIPATION: 0
ROS SKIN COMMENTS: NO CONCERNING SKIN LESIONS
TROUBLE SWALLOWING: 0
BLOOD IN STOOL: 0
VOMITING: 0
BACK PAIN: 0
NAUSEA: 0

## 2023-07-05 ASSESSMENT — PATIENT HEALTH QUESTIONNAIRE - PHQ9
SUM OF ALL RESPONSES TO PHQ QUESTIONS 1-9: 0
1. LITTLE INTEREST OR PLEASURE IN DOING THINGS: 0
SUM OF ALL RESPONSES TO PHQ9 QUESTIONS 1 & 2: 0
2. FEELING DOWN, DEPRESSED OR HOPELESS: 0

## 2023-07-05 NOTE — PROGRESS NOTES
SUBJECTIVE:  Kev Valdez is a 80 y.o. female being evaluated for:    Chief Complaint   Patient presents with    Medicare AWV       HPI   Wellness exam and feeling well exercising at [employer] and is doing well  NO problems   Patient considering colonoscopy  It was an awful experience     Allergies   Allergen Reactions    Lisinopril      cough     Current Outpatient Medications   Medication Sig Dispense Refill    furosemide (LASIX) 40 MG tablet TAKE 1 TABLET BY MOUTH EVERY DAY TAKE 1 EXTRA TABLET AS NEEDED IF WEIGHT GOES UP 2 POUNDS 180 tablet 1    atorvastatin (LIPITOR) 20 MG tablet TAKE 1 TABLET BY MOUTH DAILY 90 tablet 0    apixaban (ELIQUIS) 5 MG TABS tablet TAKE 1 TABLET BY MOUTH TWICE DAILY 60 tablet 2    potassium chloride (KLOR-CON M) 20 MEQ extended release tablet TAKE 1 TABLET BY MOUTH DAILY 90 tablet 3    amiodarone (CORDARONE) 200 MG tablet TAKE 1/2 TABLET BY MOUTH DAILY 45 tablet 2    metoprolol tartrate (LOPRESSOR) 25 MG tablet TAKE 1 TABLET BY MOUTH TWICE DAILY 180 tablet 3    Omega-3 Fatty Acids (FISH OIL) 1000 MG CAPS Take 3 capsules by mouth daily      Coenzyme Q10 (COQ10) 100 MG CAPS Take 1 tablet by mouth daily       multivitamin (THERAGRAN) per tablet Take 1 tablet by mouth daily       No current facility-administered medications for this visit. Social History     Socioeconomic History    Marital status:       Spouse name: Not on file    Number of children: Not on file    Years of education: Not on file    Highest education level: Not on file   Occupational History    Not on file   Tobacco Use    Smoking status: Never    Smokeless tobacco: Never   Vaping Use    Vaping Use: Never used   Substance and Sexual Activity    Alcohol use: Not Currently    Drug use: No    Sexual activity: Not Currently     Partners: Male   Other Topics Concern    Not on file   Social History Narrative    Not on file     Social Determinants of Health     Financial Resource Strain: Low Risk

## 2023-07-10 DIAGNOSIS — E03.2 HYPOTHYROIDISM DUE TO MEDICATION: Primary | ICD-10-CM

## 2023-07-14 NOTE — TELEPHONE ENCOUNTER
Last OV: 6/9/23  Next OV: X due Jaleel or Feb/2024  Last refill: 10/21/22  #180  3 R/F  Most recent Labs: 6/2/23  Last EKG (if needed): 6/9/23 yes...

## 2023-09-09 DIAGNOSIS — E78.5 HYPERLIPIDEMIA, UNSPECIFIED HYPERLIPIDEMIA TYPE: ICD-10-CM

## 2023-09-09 LAB
ALBUMIN SERPL-MCNC: 4.3 G/DL (ref 3.4–5)
ALP SERPL-CCNC: 58 U/L (ref 40–129)
ALT SERPL-CCNC: 15 U/L (ref 10–40)
AST SERPL-CCNC: 16 U/L (ref 15–37)
BILIRUB DIRECT SERPL-MCNC: <0.2 MG/DL (ref 0–0.3)
BILIRUB INDIRECT SERPL-MCNC: NORMAL MG/DL (ref 0–1)
BILIRUB SERPL-MCNC: 0.3 MG/DL (ref 0–1)
CHOLEST SERPL-MCNC: 172 MG/DL (ref 0–199)
HDLC SERPL-MCNC: 58 MG/DL (ref 40–60)
LDL CHOLESTEROL CALCULATED: 99 MG/DL
PROT SERPL-MCNC: 6.4 G/DL (ref 6.4–8.2)
TRIGL SERPL-MCNC: 76 MG/DL (ref 0–150)
VLDLC SERPL CALC-MCNC: 15 MG/DL

## 2023-10-06 ENCOUNTER — TELEPHONE (OUTPATIENT)
Dept: CARDIOLOGY CLINIC | Age: 83
End: 2023-10-06

## 2023-10-06 ENCOUNTER — OFFICE VISIT (OUTPATIENT)
Dept: CARDIOLOGY CLINIC | Age: 83
End: 2023-10-06

## 2023-10-06 VITALS
BODY MASS INDEX: 25.69 KG/M2 | DIASTOLIC BLOOD PRESSURE: 62 MMHG | HEART RATE: 60 BPM | WEIGHT: 145 LBS | HEIGHT: 63 IN | SYSTOLIC BLOOD PRESSURE: 138 MMHG

## 2023-10-06 DIAGNOSIS — Z79.899 ON AMIODARONE THERAPY: ICD-10-CM

## 2023-10-06 DIAGNOSIS — I48.0 PAF (PAROXYSMAL ATRIAL FIBRILLATION) (HCC): Primary | ICD-10-CM

## 2023-10-06 DIAGNOSIS — Z79.01 CURRENT USE OF ANTICOAGULANT THERAPY: ICD-10-CM

## 2023-10-06 DIAGNOSIS — I10 ESSENTIAL HYPERTENSION: ICD-10-CM

## 2023-10-06 DIAGNOSIS — E78.2 MIXED HYPERLIPIDEMIA: ICD-10-CM

## 2023-10-06 PROCEDURE — 93246 EXT ECG>7D<15D RECORDING: CPT | Performed by: INTERNAL MEDICINE

## 2023-10-06 NOTE — TELEPHONE ENCOUNTER
Monitor placed by [employer]  Length of monitor 14 days  Monitor ordered by Dr. Logan Hubbard  Serial number [id number]  Kit ID  N/A  Activation successful prior to pt leaving office?  Yes

## 2023-10-06 NOTE — PROGRESS NOTES
[facility]        Cardiology Progress Note     Veverly Ours  1940 October 6, 2023    Chief Complaint   Patient presents with    Check-Up     My blood pressure has been low. HPI:  The patient is 80 y.o. female with a past medical history significant for AFIB, SR with LBBB, HLD, HTN, borderline diabetic, and a past stroke. Last visit 6/9/23: she reports she is doing well and feeling well. She continues to work 50 hours per week. She completed routine labs and is upset about her abnormal lipid profile as she admits to dietary indiscretions and since finding out the results is working on diet. She is now going to the gym most days of the week. Today, she presents to office for follow up she reported that her fit bit told her she had episode of atrial fibrillation and noted palpitations at the time of the reading. EKG today shows sinus bradycardia frequent PAC s, # PACs = 2. LBBB. Patient denies exertional chest pain/pressure, dyspnea at rest, SANTANA, PND, orthopnea, palpitations, lightheadedness, weight changes, changes in LE edema, and syncope. She admits to medical therapy compliance and tolerating. She denies abnormal bruising or bleeding.     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
admits to dietary indiscretions and has already worked on [employer] and added exercise most days of the week.   -she does not want to go up on her medical therapy and we will recheck her liver/lipid. BLE edema  Working 50 hours per week  Has now added exercise in her weekly schedule most days of the week. Continue Lasix and compression stockings. Low salt diet discussed and advised to elevate legs as much as possible. Follow up in ***    Complexity of medical decision making-high    Thank you very much for allowing me to participate in the care of your patient. Please do not hesitate to contact me if you have any questions. Sincerely,  Pilar Woodruff MD      [facility] [place], operated by [facility]  [address], [address] [place], [address] [facility]  Ph: [phone]  Fax: [phone]        I confirm that the note above accurately reflects all work, treatment, procedures, and medical decision making performed by me.

## 2023-11-03 ENCOUNTER — TELEPHONE (OUTPATIENT)
Dept: CARDIOLOGY CLINIC | Age: 83
End: 2023-11-03

## 2023-11-03 PROCEDURE — 93248 EXT ECG>7D<15D REV&INTERPJ: CPT | Performed by: INTERNAL MEDICINE

## 2023-11-03 RX ORDER — AMIODARONE HYDROCHLORIDE 200 MG/1
200 TABLET ORAL DAILY
Qty: 90 TABLET | Refills: 3 | Status: SHIPPED | OUTPATIENT
Start: 2023-11-03

## 2023-11-03 NOTE — TELEPHONE ENCOUNTER
14 day CAM monitor final report 10/6-10/20/2023 per Dr. Trung Rodriguez revealed NSR with few episodes of PAT/PAF, which is known. Instructions to increase amiodarone to 200 mg daily and increase metoprolol to 50 mg AM and 25 mg PM then plan follow up between 2-3 months with Dr. Trung Rodriguez at our [employer] office. Called patient at [phone] and left detailed vm with the above info. E-scribed both prescriptions to pharmacy and asked her to call back to confirm receipt of message and make a f/u appt. [facility] given report to take back to [facility] and place in Billing folder.

## 2023-11-06 RX ORDER — AMIODARONE HYDROCHLORIDE 200 MG/1
100 TABLET ORAL DAILY
Qty: 45 TABLET | OUTPATIENT
Start: 2023-11-06

## 2023-11-07 DIAGNOSIS — Z79.899 ON AMIODARONE THERAPY: ICD-10-CM

## 2023-11-07 DIAGNOSIS — E78.2 MIXED HYPERLIPIDEMIA: ICD-10-CM

## 2023-11-07 DIAGNOSIS — I48.0 PAF (PAROXYSMAL ATRIAL FIBRILLATION) (HCC): ICD-10-CM

## 2023-11-07 DIAGNOSIS — I10 ESSENTIAL HYPERTENSION: ICD-10-CM

## 2023-11-07 DIAGNOSIS — I48.0 PAROXYSMAL ATRIAL FIBRILLATION (HCC): Primary | ICD-10-CM

## 2023-11-12 DIAGNOSIS — I63.30 CEREBROVASCULAR ACCIDENT (CVA) DUE TO THROMBOSIS OF CEREBRAL ARTERY (HCC): ICD-10-CM

## 2023-11-12 DIAGNOSIS — E78.2 MIXED HYPERLIPIDEMIA: ICD-10-CM

## 2023-11-13 RX ORDER — ATORVASTATIN CALCIUM 20 MG/1
20 TABLET, FILM COATED ORAL DAILY
Qty: 90 TABLET | Refills: 3 | Status: SHIPPED | OUTPATIENT
Start: 2023-11-13

## 2023-11-13 NOTE — TELEPHONE ENCOUNTER
Left 3rd detailed vm and I can see now she is scheduled for 1/5/2024 at our [place] office. Asked to contact office with any needs.

## 2023-11-28 ENCOUNTER — OFFICE VISIT (OUTPATIENT)
Dept: FAMILY MEDICINE CLINIC | Age: 83
End: 2023-11-28
Payer: MEDICARE

## 2023-11-28 VITALS
HEART RATE: 70 BPM | BODY MASS INDEX: 25.83 KG/M2 | HEIGHT: 63 IN | DIASTOLIC BLOOD PRESSURE: 60 MMHG | WEIGHT: 145.8 LBS | TEMPERATURE: 98.2 F | OXYGEN SATURATION: 96 % | SYSTOLIC BLOOD PRESSURE: 128 MMHG

## 2023-11-28 DIAGNOSIS — Z20.822 COUGH WITH EXPOSURE TO COVID-19 VIRUS: Primary | ICD-10-CM

## 2023-11-28 DIAGNOSIS — U07.1 COVID-19: ICD-10-CM

## 2023-11-28 DIAGNOSIS — R05.8 COUGH WITH EXPOSURE TO COVID-19 VIRUS: Primary | ICD-10-CM

## 2023-11-28 LAB
INFLUENZA A ANTIGEN, POC: NEGATIVE
INFLUENZA B ANTIGEN, POC: NEGATIVE
LOT EXPIRE DATE: ABNORMAL
LOT KIT NUMBER: ABNORMAL
SARS-COV-2, POC: DETECTED
VALID INTERNAL CONTROL: ABNORMAL
VENDOR AND KIT NAME POC: ABNORMAL

## 2023-11-28 PROCEDURE — 3078F DIAST BP <80 MM HG: CPT | Performed by: NURSE PRACTITIONER

## 2023-11-28 PROCEDURE — G8427 DOCREV CUR MEDS BY ELIG CLIN: HCPCS | Performed by: NURSE PRACTITIONER

## 2023-11-28 PROCEDURE — 3074F SYST BP LT 130 MM HG: CPT | Performed by: NURSE PRACTITIONER

## 2023-11-28 PROCEDURE — 1036F TOBACCO NON-USER: CPT | Performed by: NURSE PRACTITIONER

## 2023-11-28 PROCEDURE — G8399 PT W/DXA RESULTS DOCUMENT: HCPCS | Performed by: NURSE PRACTITIONER

## 2023-11-28 PROCEDURE — G8484 FLU IMMUNIZE NO ADMIN: HCPCS | Performed by: NURSE PRACTITIONER

## 2023-11-28 PROCEDURE — 1090F PRES/ABSN URINE INCON ASSESS: CPT | Performed by: NURSE PRACTITIONER

## 2023-11-28 PROCEDURE — 1124F ACP DISCUSS-NO DSCNMKR DOCD: CPT | Performed by: NURSE PRACTITIONER

## 2023-11-28 PROCEDURE — 87428 SARSCOV & INF VIR A&B AG IA: CPT | Performed by: NURSE PRACTITIONER

## 2023-11-28 PROCEDURE — G8417 CALC BMI ABV UP PARAM F/U: HCPCS | Performed by: NURSE PRACTITIONER

## 2023-11-28 PROCEDURE — 99214 OFFICE O/P EST MOD 30 MIN: CPT | Performed by: NURSE PRACTITIONER

## 2023-11-28 RX ORDER — GUAIFENESIN AND DEXTROMETHORPHAN HYDROBROMIDE 1200; 60 MG/1; MG/1
1 TABLET, EXTENDED RELEASE ORAL EVERY 12 HOURS
Qty: 14 TABLET | Refills: 0 | Status: SHIPPED | OUTPATIENT
Start: 2023-11-28

## 2023-11-28 ASSESSMENT — ENCOUNTER SYMPTOMS
WHEEZING: 0
SHORTNESS OF BREATH: 0
COUGH: 1
NAUSEA: 0
VOMITING: 0
ABDOMINAL PAIN: 0

## 2023-11-28 NOTE — PATIENT INSTRUCTIONS
OTC treatments consist of Tylenol for fever and body aches. OTC cough medicine along with Emergen-C Immune Plus, this has high doses of Vit C, Vit D and zinc to help recover from symptoms faster. Stay well hydrated, drink at least 64 oz of water and keep nutrition up with high protein snacks/drinks to help boost immune system. If pt develops dizziness, syncope, SOB at rest, O2 drops below 92% or other concerning symptoms, they should be evaluated at the ED.

## 2023-11-28 NOTE — PROGRESS NOTES
Ginna Shoemaker (:  1940) is a 80 y.o. female,Established patient, here for evaluation of the following chief complaint(s):  Cough (Sick for the past 3 days with nasal congestion and runny nose. Had to leave work at [employer]. Pts daughter and grandson has Covid. Had body aches last night. Does not own a thermometer. Has Sinus pressure and headache, throat is sore, ear pressure. Pt has been taking Tylenol every 6 hours.  )         ASSESSMENT/PLAN:  1. Cough with exposure to COVID-19 virus  - POCT COVID-19 & Influenza A/B    2. COVID-19  New. Pt not a candidate for antiviral due to maintenance cardiac medications. Follow pt education included in AVS.        Return if symptoms worsen or fail to improve. Subjective   SUBJECTIVE/OBJECTIVE:  HPI  Chief Complaint   Patient presents with    Cough     Sick for the past 3 days with nasal congestion and runny nose. Had to leave work at [employer]. Pts daughter and grandson has Covid. Had body aches last night. Does not own a thermometer. Has Sinus pressure and headache, throat is sore, ear pressure. Pt has been taking Tylenol every 6 hours. Pt has not tried OTC treatments. PO intake is good, denies sob, wheezing, abd pain, n/v/d. Current Outpatient Medications   Medication Sig Dispense Refill    Dextromethorphan-guaiFENesin  MG TB12 Take 1 tablet by mouth in the morning and 1 tablet in the evening.  14 tablet 0    apixaban (ELIQUIS) 5 MG TABS tablet TAKE 1 TABLET BY MOUTH TWICE DAILY 60 tablet 2    atorvastatin (LIPITOR) 20 MG tablet TAKE 1 TABLET BY MOUTH DAILY 90 tablet 3    metoprolol tartrate (LOPRESSOR) 25 MG tablet 50 mg (2 tablet) by mouth each morning and 25 mg (1 tablet) by mouth each evening 270 tablet 3    amiodarone (CORDARONE) 200 MG tablet Take 1 tablet by mouth daily 90 tablet 3    furosemide (LASIX) 40 MG tablet TAKE 1 TABLET BY MOUTH EVERY DAY TAKE 1 EXTRA TABLET AS NEEDED IF WEIGHT GOES UP 2 POUNDS 180 tablet 1    potassium chloride

## 2023-12-08 ENCOUNTER — TELEPHONE (OUTPATIENT)
Dept: FAMILY MEDICINE CLINIC | Age: 83
End: 2023-12-08

## 2023-12-08 NOTE — TELEPHONE ENCOUNTER
Recommend urgent care eval, she may need cxr and urine checked. Wen Dominguez has urgent care in [place], [address] [place] [address].  [facility] [id number]

## 2023-12-08 NOTE — TELEPHONE ENCOUNTER
Patient diagnosed with covid 11/28/23, she is calling today with productive cough,- sounds bad. green mucus, pressure in nose, has bad pain on left side towards back. She not sure if its her kidney.       She is asking if antibiotics can be sent to [facility]      Please call, [phone]

## 2023-12-13 ENCOUNTER — HOSPITAL ENCOUNTER (EMERGENCY)
Age: 83
Discharge: HOME | End: 2023-12-13
Attending: EMERGENCY MEDICINE
Payer: MEDICARE

## 2023-12-13 VITALS
OXYGEN SATURATION: 99 % | HEIGHT: 63 IN | SYSTOLIC BLOOD PRESSURE: 158 MMHG | WEIGHT: 152.12 LBS | BODY MASS INDEX: 26.95 KG/M2 | RESPIRATION RATE: 17 BRPM | TEMPERATURE: 97.1 F | DIASTOLIC BLOOD PRESSURE: 71 MMHG | HEART RATE: 58 BPM

## 2023-12-13 DIAGNOSIS — S39.012A BACK STRAIN, INITIAL ENCOUNTER: Primary | ICD-10-CM

## 2023-12-13 PROCEDURE — 99283 EMERGENCY DEPT VISIT LOW MDM: CPT

## 2023-12-13 RX ORDER — PREDNISONE 20 MG/1
TABLET ORAL
Qty: 10 TABLET | Refills: 0 | Status: SHIPPED | OUTPATIENT
Start: 2023-12-13 | End: 2023-12-23

## 2023-12-13 ASSESSMENT — PAIN DESCRIPTION - LOCATION: LOCATION: BACK

## 2023-12-13 ASSESSMENT — PAIN SCALES - GENERAL
PAINLEVEL_OUTOF10: 9
PAINLEVEL_OUTOF10: 9

## 2023-12-13 ASSESSMENT — PAIN DESCRIPTION - ORIENTATION: ORIENTATION: LOWER

## 2023-12-13 ASSESSMENT — PAIN DESCRIPTION - DESCRIPTORS: DESCRIPTORS: ACHING

## 2023-12-13 NOTE — DISCHARGE INSTRUCTIONS
Use ice packs or cold compresses to the painful areas in your back every 2-3 hours for the next 2 days. Rest for the next 2 days, no excessive bending, no lifting. Continue to take Tylenol every 6 hours as needed for pain. Follow-up in 5 to 7 days with your primary care physician for continued pain. Return at anytime for worsening of symptoms or new symptoms of concern.

## 2023-12-13 NOTE — ED PROVIDER NOTES
[address] [facility]  eMERGENCY dEPARTMENT eNCOUnter      Pt Name: Dontrell Guerrero  MRN: [id number]  [address] [facility] 1940  Date of evaluation: 12/13/2023  Provider: Meet Ames MD    CHIEF COMPLAINT       Chief Complaint   Patient presents with    Back Pain     Lower back pain. Reports overused at work on Thursday and pain has worsened. Has taken tylenol with no relief. CRITICAL CARE TIME   Total Critical Care time was 0 minutes, excluding separately reportable procedures. There was a high probability of clinically significant/life threatening deterioration in the patient's condition which required my urgent intervention. HISTORY OF PRESENT ILLNESS  (Location/Symptom, Timing/Onset, Context/Setting, Quality, Duration, Modifying Factors, Severity.)   History From: Patient  Limitations to history : None    Dontrell Guerrero is a 80 y.o. female who presents to the emergency department complaining of mid low back pain. She states she thinks she overdid it while working. She states her normal job as she stands up and does some repetitive work with her hands but on Thursday they put her in a job where she had to work in a bent over position most of the day. She states it was after that her back started bothering her. There are some pain in her mid and low back. It is worse with movement. It is nonradiating. No numbness tingling or paresthesias. No chest pain or abdominal pain. No frequency urgency or dysuria. No extremity weakness numbness or paresthesias. She states she had some back problems back in the 60s but really has not had any problems since then. Nursing Notes were reviewed and I agree.       SCREENINGS        Chandana Coma Scale  Eye Opening: Spontaneous  Best Verbal Response: Oriented  Best Motor Response: Obeys commands  Lamont Coma Scale Score: 15                WA Assessment  BP: (!) 158/71  Pulse: 58           REVIEW OF SYSTEMS    (2-9 systems

## 2023-12-13 NOTE — ED TRIAGE NOTES
Pt presents to ED ambulatory via PV with c/o lower back pain. Reports overused at work Thursday from bending and now having worsening pain. Worsening with movement. Reports had to leave work to come to ED due to the pain. Resp even and unlabored. A/ox4. No acute distress noted. Denies any need at this time. Call light within reach. Bed in lowest position.

## 2023-12-13 NOTE — ED NOTES
Pt discharged from ED to home. Pt verbalizes understanding to discharge instructions, teach back successful. Pt denies questions at this time. No acute distress noted. Resp even and unlabored. A/ox4. Pt instructed to follow-up as noted - return to ED if symptoms worsen. Pt verbalizes understanding. Discharged per ED MD with discharge instructions. Pt refuses ambulatory assistance to lobby and walks with steady gait.         Doug Khanna RN  12/13/23 [id number]

## 2023-12-26 RX ORDER — POTASSIUM CHLORIDE 20 MEQ/1
20 TABLET, EXTENDED RELEASE ORAL DAILY
Qty: 90 TABLET | Refills: 3 | Status: SHIPPED | OUTPATIENT
Start: 2023-12-26

## 2023-12-26 RX ORDER — FUROSEMIDE 40 MG/1
TABLET ORAL
Qty: 180 TABLET | Refills: 1 | Status: SHIPPED | OUTPATIENT
Start: 2023-12-26

## 2023-12-26 NOTE — TELEPHONE ENCOUNTER
Last OV: 10/6/23  Next OV: 1/5/24  Last refill: 3/13/23  #90 3 R/F  Most recent Labs: 9/9/23  Last EKG (if needed): 10/6/23

## 2023-12-30 DIAGNOSIS — E03.2 HYPOTHYROIDISM DUE TO MEDICATION: ICD-10-CM

## 2023-12-30 LAB
T4 FREE SERPL-MCNC: 0.9 NG/DL (ref 0.9–1.8)
TSH SERPL DL<=0.005 MIU/L-ACNC: 6.05 UIU/ML (ref 0.27–4.2)

## 2024-01-04 ENCOUNTER — OFFICE VISIT (OUTPATIENT)
Dept: FAMILY MEDICINE CLINIC | Age: 84
End: 2024-01-04
Payer: MEDICARE

## 2024-01-04 VITALS
BODY MASS INDEX: 25.87 KG/M2 | WEIGHT: 146 LBS | SYSTOLIC BLOOD PRESSURE: 170 MMHG | DIASTOLIC BLOOD PRESSURE: 62 MMHG | HEIGHT: 63 IN | HEART RATE: 79 BPM | TEMPERATURE: 98.2 F | OXYGEN SATURATION: 99 %

## 2024-01-04 DIAGNOSIS — I50.32 CHRONIC DIASTOLIC (CONGESTIVE) HEART FAILURE (HCC): ICD-10-CM

## 2024-01-04 DIAGNOSIS — E03.2 HYPOTHYROIDISM DUE TO MEDICATION: ICD-10-CM

## 2024-01-04 DIAGNOSIS — I48.0 AF (PAROXYSMAL ATRIAL FIBRILLATION) (HCC): ICD-10-CM

## 2024-01-04 DIAGNOSIS — R41.0 CONFUSION: ICD-10-CM

## 2024-01-04 DIAGNOSIS — I10 ESSENTIAL HYPERTENSION: Primary | ICD-10-CM

## 2024-01-04 DIAGNOSIS — I48.0 PAF (PAROXYSMAL ATRIAL FIBRILLATION) (HCC): ICD-10-CM

## 2024-01-04 PROBLEM — I50.33 ACUTE ON CHRONIC DIASTOLIC HEART FAILURE (HCC): Status: RESOLVED | Noted: 2019-03-17 | Resolved: 2024-01-04

## 2024-01-04 PROCEDURE — G8484 FLU IMMUNIZE NO ADMIN: HCPCS | Performed by: INTERNAL MEDICINE

## 2024-01-04 PROCEDURE — 1124F ACP DISCUSS-NO DSCNMKR DOCD: CPT | Performed by: INTERNAL MEDICINE

## 2024-01-04 PROCEDURE — 3078F DIAST BP <80 MM HG: CPT | Performed by: INTERNAL MEDICINE

## 2024-01-04 PROCEDURE — G8399 PT W/DXA RESULTS DOCUMENT: HCPCS | Performed by: INTERNAL MEDICINE

## 2024-01-04 PROCEDURE — 1036F TOBACCO NON-USER: CPT | Performed by: INTERNAL MEDICINE

## 2024-01-04 PROCEDURE — 1090F PRES/ABSN URINE INCON ASSESS: CPT | Performed by: INTERNAL MEDICINE

## 2024-01-04 PROCEDURE — G8417 CALC BMI ABV UP PARAM F/U: HCPCS | Performed by: INTERNAL MEDICINE

## 2024-01-04 PROCEDURE — 3077F SYST BP >= 140 MM HG: CPT | Performed by: INTERNAL MEDICINE

## 2024-01-04 PROCEDURE — 99214 OFFICE O/P EST MOD 30 MIN: CPT | Performed by: INTERNAL MEDICINE

## 2024-01-04 PROCEDURE — G8427 DOCREV CUR MEDS BY ELIG CLIN: HCPCS | Performed by: INTERNAL MEDICINE

## 2024-01-04 ASSESSMENT — ENCOUNTER SYMPTOMS
SHORTNESS OF BREATH: 0
NAUSEA: 0
COUGH: 0
CONSTIPATION: 0
DIARRHEA: 0
BLOOD IN STOOL: 0
ABDOMINAL PAIN: 0
VOMITING: 0

## 2024-01-04 ASSESSMENT — PATIENT HEALTH QUESTIONNAIRE - PHQ9
SUM OF ALL RESPONSES TO PHQ QUESTIONS 1-9: 0
SUM OF ALL RESPONSES TO PHQ9 QUESTIONS 1 & 2: 0
1. LITTLE INTEREST OR PLEASURE IN DOING THINGS: 0
2. FEELING DOWN, DEPRESSED OR HOPELESS: 0

## 2024-01-04 NOTE — PROGRESS NOTES
No    Sexual activity: Not Currently     Partners: Male   Other Topics Concern    Not on file   Social History Narrative    Not on file     Social Determinants of Health     Financial Resource Strain: Low Risk  (4/25/2023)    Overall Financial Resource Strain (CARDIA)     Difficulty of Paying Living Expenses: Not hard at all   Food Insecurity: Not on file (4/24/2023)   Transportation Needs: No Transportation Needs (4/25/2023)    PRAPARE - Transportation     Lack of Transportation (Medical): No     Lack of Transportation (Non-Medical): No   Physical Activity: Insufficiently Active (7/5/2023)    Exercise Vital Sign     Days of Exercise per Week: 3 days     Minutes of Exercise per Session: 30 min   Stress: No Stress Concern Present (4/25/2023)    [employer] - Occupational Stress Questionnaire     Feeling of Stress : Not at all   Social Connections: Moderately Isolated (4/25/2023)    Social Connection and Isolation Panel [NHANES]     Frequency of Communication with Friends and Family: More than three times a week     Frequency of Social Gatherings with Friends and Family: More than three times a week     Attends Denominational Services: 1 to 4 times per year     Active Member of Clubs or Organizations: No     Attends Club or Organization Meetings: Never     Marital Status:    Intimate Partner Violence: Not on file   Housing Stability: Low Risk  (4/25/2023)    Housing Stability Vital Sign     Unable to Pay for Housing in the Last Year: No     Number of Places Lived in the Last Year: 1     Unstable Housing in the Last Year: No      Past Medical History:   Diagnosis Date    Atrial fibrillation (HCC)     Bleeding ulcer 01/2019    Fibromyalgia     History of blood transfusion 01/2019    2 units    Hyperlipidemia     Hypertension     Inguinal hernia 02/2023    left    Osteoporosis     RLS (restless legs syndrome)     Shingles     Stroke (HCC) 2004    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

## 2024-01-05 ENCOUNTER — TELEPHONE (OUTPATIENT)
Dept: CARDIOLOGY CLINIC | Age: 84
End: 2024-01-05

## 2024-01-05 ENCOUNTER — OFFICE VISIT (OUTPATIENT)
Dept: CARDIOLOGY CLINIC | Age: 84
End: 2024-01-05

## 2024-01-05 VITALS
HEIGHT: 60 IN | HEART RATE: 60 BPM | WEIGHT: 151 LBS | OXYGEN SATURATION: 99 % | BODY MASS INDEX: 29.64 KG/M2 | DIASTOLIC BLOOD PRESSURE: 60 MMHG | SYSTOLIC BLOOD PRESSURE: 156 MMHG

## 2024-01-05 DIAGNOSIS — Z79.899 LONG TERM CURRENT USE OF AMIODARONE: ICD-10-CM

## 2024-01-05 DIAGNOSIS — I48.91 ATRIAL FIBRILLATION WITH RVR (HCC): ICD-10-CM

## 2024-01-05 DIAGNOSIS — E78.2 MIXED HYPERLIPIDEMIA: Primary | ICD-10-CM

## 2024-01-05 DIAGNOSIS — R60.0 BILATERAL LOWER EXTREMITY EDEMA: ICD-10-CM

## 2024-01-05 DIAGNOSIS — R79.89 ABNORMAL TSH: ICD-10-CM

## 2024-01-05 DIAGNOSIS — I10 ESSENTIAL HYPERTENSION: ICD-10-CM

## 2024-01-05 RX ORDER — AMLODIPINE BESYLATE 2.5 MG/1
2.5 TABLET ORAL
Qty: 90 TABLET | Refills: 3 | Status: SHIPPED | OUTPATIENT
Start: 2024-01-05

## 2024-01-05 NOTE — TELEPHONE ENCOUNTER
Malika's PCP office called in stating Malika was seen in their office yesterday and Malika seemed confused and Dr. Paris wants to speak with Misael on what was discussed at the OV yesterday.    Dr. Paris can be reached at [phone] (cell)

## 2024-01-05 NOTE — TELEPHONE ENCOUNTER
Talk to Dr. Paris about Malika.  We both agreed that she should be on additional medicine for her high blood pressure and Dr. Paris will be sending prescription to the pharmacy

## 2024-01-05 NOTE — PROGRESS NOTES
abnormal thyroid abnormal labs. She would like to remain on amiodarone for now.   -we have discussed possible need for small dose of thyroid medication per Dr. Paris if her repeat thyroid profile again shows evidence of hypothyroidism. She is due for repeat  TSH with reflex in January 2024  14 day CAM patch monitor to assess atrial fibrillation burden.  Since her smart watch raised the possibility of recurrent atrial fibrillation      Hypertension, essential   -Blood pressure is stable today for an octogenarian.   -BMP stable 6/2/2023    Hyperlipidemia  -LDL goal < 100 with no hx of CAD.   -10/2020 LDL 85    -3/26/22 LDLc 97  -9/9/23 LDLc99  -Continue statin therapy, Atorvastatin 20 mg daily,  tolerating well no reported myalgias.      BLE edema  - Resolved.  -Wears compression stocking daily.  - Working 50 hours per week  -Has now added exercise in her weekly schedule most days of the week.   - Continue Lasix and compression stockings.   - Low salt diet discussed and advised to elevate legs as much as possible.       Follow up in      Sincerely,  Gurvinder Douglas MD      [facility] [place]  [address]   [place] [address]  Ph: [phone]  Fax: [phone]            
schedule most days of the week.   - Continue Lasix and compression stockings.   - Low salt diet discussed and advised to elevate legs as much as possible.   -will monitor with addition of amlodipine     Follow up in 7-8 months   Complexity of medical decision making-high    Sincerely,  Gurvinder Douglas MD      [facility] [place]  [address]   [place] [address]  Ph: [phone]  Fax: [phone]      This note was scribed in the presence of Dr. DEJA Douglas MD by Ann White RN.  Physician Attestation:  The scribes documentation has been prepared under my direction and personally reviewed by me in its entirety.     I confirm that the note above accurately reflects all work, treatment, procedures, and medical decision making performed by me.

## 2024-01-05 NOTE — PATIENT INSTRUCTIONS
1) Start amlodipine 2.5 mg daily around lunchtime  2) Work on low sodium diet   3) PFT's (lung function test) few day to 1 week prior to next follow up in 7-8 months and we will discuss at that visit.

## 2024-03-27 RX ORDER — FUROSEMIDE 40 MG/1
TABLET ORAL
Qty: 270 TABLET | Refills: 0 | Status: SHIPPED | OUTPATIENT
Start: 2024-03-27

## 2024-05-27 NOTE — TELEPHONE ENCOUNTER
[address] [facility]  Heart Failure Service  [phone]        Follow up phone call:    I called and left another message for patient to call to schedule an appointment. I also called and left a message for patient's daughter listed as emergency contact.     PLAN:   I will try to reach patient again next week.     Appropriate staff has been notified with regards to any concerns noted above   [address] [facility]  Heart Failure Service  [phone]   
Warm

## 2024-08-05 DIAGNOSIS — Z79.899 ON STATIN THERAPY: ICD-10-CM

## 2024-08-05 DIAGNOSIS — Z79.899 ON AMIODARONE THERAPY: Primary | ICD-10-CM

## 2024-08-05 RX ORDER — AMIODARONE HYDROCHLORIDE 200 MG/1
200 TABLET ORAL DAILY
Qty: 90 TABLET | Refills: 4 | Status: SHIPPED | OUTPATIENT
Start: 2024-08-05

## 2024-08-05 NOTE — TELEPHONE ENCOUNTER
Last OV: 1/5/24  Next OV: not scheduled.  Last refill: 11/3/23  Most recent Labs: T4, TSH 12/30/23; CMP 6/2/23  Last EKG (if needed): EKG 1/5/24

## 2024-08-17 ENCOUNTER — APPOINTMENT (OUTPATIENT)
Dept: CT IMAGING | Age: 84
End: 2024-08-17
Payer: MEDICARE

## 2024-08-17 ENCOUNTER — HOSPITAL ENCOUNTER (EMERGENCY)
Age: 84
Discharge: HOME | End: 2024-08-17
Attending: EMERGENCY MEDICINE
Payer: MEDICARE

## 2024-08-17 VITALS
DIASTOLIC BLOOD PRESSURE: 75 MMHG | BODY MASS INDEX: 27.46 KG/M2 | HEIGHT: 63 IN | OXYGEN SATURATION: 97 % | HEART RATE: 56 BPM | WEIGHT: 154.98 LBS | TEMPERATURE: 98 F | SYSTOLIC BLOOD PRESSURE: 163 MMHG | RESPIRATION RATE: 21 BRPM

## 2024-08-17 DIAGNOSIS — M26.622 ARTHRALGIA OF LEFT TEMPOROMANDIBULAR JOINT: Primary | ICD-10-CM

## 2024-08-17 DIAGNOSIS — I10 ESSENTIAL HYPERTENSION: ICD-10-CM

## 2024-08-17 DIAGNOSIS — Z79.01 ANTICOAGULATED: ICD-10-CM

## 2024-08-17 LAB
ANION GAP SERPL CALCULATED.3IONS-SCNC: 9 MMOL/L (ref 3–16)
BASOPHILS # BLD: 0 K/UL (ref 0–0.2)
BASOPHILS NFR BLD: 0.5 %
BUN SERPL-MCNC: 36 MG/DL (ref 7–20)
CALCIUM SERPL-MCNC: 9 MG/DL (ref 8.3–10.6)
CHLORIDE SERPL-SCNC: 105 MMOL/L (ref 99–110)
CO2 SERPL-SCNC: 28 MMOL/L (ref 21–32)
CREAT SERPL-MCNC: 1.1 MG/DL (ref 0.6–1.2)
DEPRECATED RDW RBC AUTO: 14 % (ref 12.4–15.4)
EOSINOPHIL # BLD: 0.2 K/UL (ref 0–0.6)
EOSINOPHIL NFR BLD: 3.5 %
GFR SERPLBLD CREATININE-BSD FMLA CKD-EPI: 50 ML/MIN/{1.73_M2}
GLUCOSE SERPL-MCNC: 88 MG/DL (ref 70–99)
HCT VFR BLD AUTO: 36.3 % (ref 36–48)
HGB BLD-MCNC: 11.4 G/DL (ref 12–16)
IMM GRANULOCYTES # BLD: 0 K/UL (ref 0–0.2)
IMM GRANULOCYTES NFR BLD: 0.2 %
LYMPHOCYTES # BLD: 2.3 K/UL (ref 1–5.1)
LYMPHOCYTES NFR BLD: 34.5 %
MCH RBC QN AUTO: 30 PG (ref 26–34)
MCHC RBC AUTO-ENTMCNC: 31.4 G/DL (ref 32–36.4)
MCV RBC AUTO: 95.5 FL (ref 80–100)
MONOCYTES # BLD: 0.6 K/UL (ref 0–1.3)
MONOCYTES NFR BLD: 8.7 %
NEUTROPHILS # BLD: 3.5 K/UL (ref 1.7–7.7)
NEUTROPHILS NFR BLD: 52.6 %
PLATELET # BLD AUTO: 149 K/UL (ref 135–450)
PMV BLD AUTO: 12.2 FL (ref 5–10.5)
POTASSIUM SERPL-SCNC: 4.5 MMOL/L (ref 3.5–5.1)
RBC # BLD AUTO: 3.8 M/UL (ref 4–5.2)
SODIUM SERPL-SCNC: 142 MMOL/L (ref 136–145)
WBC # BLD AUTO: 6.6 K/UL (ref 4–11)

## 2024-08-17 PROCEDURE — 36415 COLL VENOUS BLD VENIPUNCTURE: CPT

## 2024-08-17 PROCEDURE — 99285 EMERGENCY DEPT VISIT HI MDM: CPT

## 2024-08-17 PROCEDURE — 80048 BASIC METABOLIC PNL TOTAL CA: CPT

## 2024-08-17 PROCEDURE — 85025 COMPLETE CBC W/AUTO DIFF WBC: CPT

## 2024-08-17 PROCEDURE — 6360000004 HC RX CONTRAST MEDICATION: Performed by: EMERGENCY MEDICINE

## 2024-08-17 PROCEDURE — 6370000000 HC RX 637 (ALT 250 FOR IP): Performed by: EMERGENCY MEDICINE

## 2024-08-17 PROCEDURE — 70491 CT SOFT TISSUE NECK W/DYE: CPT

## 2024-08-17 RX ORDER — ACETAMINOPHEN 500 MG
500 TABLET ORAL ONCE
Status: COMPLETED | OUTPATIENT
Start: 2024-08-17 | End: 2024-08-17

## 2024-08-17 RX ORDER — CLONIDINE HYDROCHLORIDE 0.1 MG/1
0.1 TABLET ORAL ONCE
Status: COMPLETED | OUTPATIENT
Start: 2024-08-17 | End: 2024-08-17

## 2024-08-17 RX ADMIN — IOMEPROL INJECTION 100 ML: 714 INJECTION, SOLUTION INTRAVASCULAR at 17:18

## 2024-08-17 RX ADMIN — ACETAMINOPHEN 500 MG: 500 TABLET ORAL at 18:14

## 2024-08-17 RX ADMIN — CLONIDINE HYDROCHLORIDE 0.1 MG: 0.1 TABLET ORAL at 18:14

## 2024-08-17 ASSESSMENT — PAIN DESCRIPTION - LOCATION
LOCATION: EAR
LOCATION: EAR

## 2024-08-17 ASSESSMENT — PAIN - FUNCTIONAL ASSESSMENT: PAIN_FUNCTIONAL_ASSESSMENT: 0-10

## 2024-08-17 ASSESSMENT — PAIN SCALES - GENERAL
PAINLEVEL_OUTOF10: 5
PAINLEVEL_OUTOF10: 8

## 2024-08-17 ASSESSMENT — PAIN DESCRIPTION - ORIENTATION: ORIENTATION: LEFT

## 2024-08-17 ASSESSMENT — PAIN DESCRIPTION - PAIN TYPE: TYPE: ACUTE PAIN

## 2024-08-17 ASSESSMENT — PAIN DESCRIPTION - DESCRIPTORS: DESCRIPTORS: ACHING

## 2024-08-17 NOTE — ED NOTES
Patient placed on cardiac telemetry due to trending hypertension. Patient continues to deny chest pain or other related symptoms. Patient confirms hx of hypertension, states she did take her medications today.     Slow atrial fibrillation noted on monitor with wide QRS. Lead II appears similar to that on the most recent EKG in historical chart.    DO aware.   Awake

## 2024-08-17 NOTE — ED TRIAGE NOTES
Patient reports sore throat and bilateral ear pain x3 days. States pain is worse in left ear. States a coworker was coughing at work on Monday. Reports dry cough, denies other symptoms.    Patient resting on bed, respirations even and easy at this time. No obvious distress.

## 2024-08-18 NOTE — ED PROVIDER NOTES
EMERGENCY DEPARTMENT PROVIDER NOTE    Patient Identification  Pt Name: Malika Nation  MRN: [id number]  Birthdate 1940  Date of evaluation: 8/17/2024  Provider: Lennox Blanco DO  PCP: Cynthia Paris MD    Chief Complaint  Pharyngitis (Patient reports sore throat and bilateral ear pain x3 days. States pain is worse in left ear. States a coworker was coughing at work on Monday. Reports dry cough, denies other symptoms.) and Otalgia      HPI  (History provided by patient)  This is a 83 y.o. female with pertinent past medical history of chronic atrial fibrillation, hypertension, chronic anticoagulation who was brought in by self for left facial and left ear pain.  Patient reports the symptoms ongoing for the past several months, describes the pain as sharp and stabbing, radiates from the front of her left ear down to her neck and throat.  Nothing clearly seems to bring on the symptoms, worsened with touch and jaw movement.  Patient reports a sensation of throat swelling with the symptoms as well as worsening pain ongoing for the past 2 days.  She denies any fevers, chills, dizziness, vision changes or extremity weakness/numbness.  Has been taking Tylenol at home with temporary relief..       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

## 2024-08-31 DIAGNOSIS — Z79.899 ON AMIODARONE THERAPY: ICD-10-CM

## 2024-08-31 DIAGNOSIS — Z79.899 ON STATIN THERAPY: ICD-10-CM

## 2024-08-31 LAB
ALBUMIN SERPL-MCNC: 4.3 G/DL (ref 3.4–5)
ALBUMIN/GLOB SERPL: 2.2 {RATIO} (ref 1.1–2.2)
ALP SERPL-CCNC: 66 U/L (ref 40–129)
ALT SERPL-CCNC: 17 U/L (ref 10–40)
ANION GAP SERPL CALCULATED.3IONS-SCNC: 10 MMOL/L (ref 3–16)
AST SERPL-CCNC: 17 U/L (ref 15–37)
BILIRUB SERPL-MCNC: <0.2 MG/DL (ref 0–1)
BUN SERPL-MCNC: 37 MG/DL (ref 7–20)
CALCIUM SERPL-MCNC: 9.3 MG/DL (ref 8.3–10.6)
CHLORIDE SERPL-SCNC: 103 MMOL/L (ref 99–110)
CHOLEST SERPL-MCNC: 182 MG/DL (ref 0–199)
CO2 SERPL-SCNC: 28 MMOL/L (ref 21–32)
CREAT SERPL-MCNC: 1.1 MG/DL (ref 0.6–1.2)
GFR SERPLBLD CREATININE-BSD FMLA CKD-EPI: 50 ML/MIN/{1.73_M2}
GLUCOSE P FAST SERPL-MCNC: 86 MG/DL (ref 70–99)
HDLC SERPL-MCNC: 61 MG/DL (ref 40–60)
LDL CHOLESTEROL: 107 MG/DL
POTASSIUM SERPL-SCNC: 4.5 MMOL/L (ref 3.5–5.1)
PROT SERPL-MCNC: 6.3 G/DL (ref 6.4–8.2)
SODIUM SERPL-SCNC: 141 MMOL/L (ref 136–145)
T4 FREE SERPL-MCNC: 1 NG/DL (ref 0.9–1.8)
TRIGL SERPL-MCNC: 68 MG/DL (ref 0–150)
TSH SERPL DL<=0.005 MIU/L-ACNC: 5.09 UIU/ML (ref 0.27–4.2)
VLDLC SERPL CALC-MCNC: 14 MG/DL

## 2024-09-09 ENCOUNTER — OFFICE VISIT (OUTPATIENT)
Dept: CARDIOLOGY CLINIC | Age: 84
End: 2024-09-09
Payer: MEDICARE

## 2024-09-09 VITALS
DIASTOLIC BLOOD PRESSURE: 60 MMHG | HEART RATE: 66 BPM | HEIGHT: 63 IN | WEIGHT: 152 LBS | OXYGEN SATURATION: 96 % | SYSTOLIC BLOOD PRESSURE: 144 MMHG | BODY MASS INDEX: 26.93 KG/M2

## 2024-09-09 DIAGNOSIS — I10 ESSENTIAL HYPERTENSION: ICD-10-CM

## 2024-09-09 DIAGNOSIS — I47.19 PAT (PAROXYSMAL ATRIAL TACHYCARDIA) (HCC): ICD-10-CM

## 2024-09-09 DIAGNOSIS — R60.0 BILATERAL LOWER EXTREMITY EDEMA: ICD-10-CM

## 2024-09-09 DIAGNOSIS — E78.5 HYPERLIPIDEMIA, UNSPECIFIED HYPERLIPIDEMIA TYPE: ICD-10-CM

## 2024-09-09 DIAGNOSIS — I48.0 PAF (PAROXYSMAL ATRIAL FIBRILLATION) (HCC): Primary | ICD-10-CM

## 2024-09-09 PROCEDURE — G8417 CALC BMI ABV UP PARAM F/U: HCPCS | Performed by: INTERNAL MEDICINE

## 2024-09-09 PROCEDURE — 1036F TOBACCO NON-USER: CPT | Performed by: INTERNAL MEDICINE

## 2024-09-09 PROCEDURE — 3077F SYST BP >= 140 MM HG: CPT | Performed by: INTERNAL MEDICINE

## 2024-09-09 PROCEDURE — 93000 ELECTROCARDIOGRAM COMPLETE: CPT | Performed by: INTERNAL MEDICINE

## 2024-09-09 PROCEDURE — 3078F DIAST BP <80 MM HG: CPT | Performed by: INTERNAL MEDICINE

## 2024-09-09 PROCEDURE — 99214 OFFICE O/P EST MOD 30 MIN: CPT | Performed by: INTERNAL MEDICINE

## 2024-09-09 PROCEDURE — 1124F ACP DISCUSS-NO DSCNMKR DOCD: CPT | Performed by: INTERNAL MEDICINE

## 2024-09-09 PROCEDURE — G8427 DOCREV CUR MEDS BY ELIG CLIN: HCPCS | Performed by: INTERNAL MEDICINE

## 2024-09-09 PROCEDURE — G8399 PT W/DXA RESULTS DOCUMENT: HCPCS | Performed by: INTERNAL MEDICINE

## 2024-09-09 PROCEDURE — 1090F PRES/ABSN URINE INCON ASSESS: CPT | Performed by: INTERNAL MEDICINE

## 2024-09-09 RX ORDER — AMIODARONE HYDROCHLORIDE 200 MG/1
TABLET ORAL
Qty: 68 TABLET | Refills: 3 | Status: SHIPPED | OUTPATIENT
Start: 2024-09-09

## 2024-09-16 RX ORDER — METOPROLOL TARTRATE 25 MG/1
TABLET, FILM COATED ORAL
Qty: 270 TABLET | Refills: 3 | Status: SHIPPED | OUTPATIENT
Start: 2024-09-16

## 2024-10-10 ENCOUNTER — HOSPITAL ENCOUNTER (OUTPATIENT)
Dept: PULMONOLOGY | Age: 84
Discharge: HOME | End: 2024-10-10
Attending: INTERNAL MEDICINE
Payer: MEDICARE

## 2024-10-10 DIAGNOSIS — Z79.899 LONG TERM CURRENT USE OF AMIODARONE: ICD-10-CM

## 2024-10-10 DIAGNOSIS — R79.89 ABNORMAL TSH: ICD-10-CM

## 2024-10-10 LAB
DLCO %PRED: 127 %
DLCO PRED: NORMAL
DLCO/VA %PRED: NORMAL
DLCO/VA PRED: NORMAL
DLCO/VA: NORMAL
DLCO: NORMAL
EXPIRATORY TIME-POST: NORMAL
EXPIRATORY TIME: NORMAL
FEF 25-75 %CHNG: NORMAL
FEF 25-75 POST %PRED: NORMAL
FEF 25-75% %PRED-PRE: NORMAL
FEF 25-75% PRED: NORMAL
FEF 25-75-POST: NORMAL
FEF 25-75-PRE: NORMAL
FEV1 %PRED-POST: NORMAL
FEV1 %PRED-PRE: 94 %
FEV1 PRED: NORMAL
FEV1-POST: NORMAL
FEV1-PRE: NORMAL
FEV1/FVC %PRED-POST: NORMAL
FEV1/FVC %PRED-PRE: NORMAL
FEV1/FVC PRED: NORMAL
FEV1/FVC-POST: NORMAL
FEV1/FVC-PRE: 78 %
FVC %PRED-POST: NORMAL
FVC %PRED-PRE: 92 %
FVC PRED: NORMAL
FVC-POST: NORMAL
FVC-PRE: 2.03 L
GAW %PRED: NORMAL
GAW PRED: NORMAL
GAW: NORMAL
IC PRE %PRED: NORMAL
IC PRED: NORMAL
IC: NORMAL
MEP: NORMAL
MIP: NORMAL
MVV %PRED-PRE: NORMAL
MVV PRED: NORMAL
MVV-PRE: NORMAL
PEF %PRED-POST: NORMAL
PEF %PRED-PRE: NORMAL
PEF PRED: NORMAL
PEF%CHNG: NORMAL
PEF-POST: NORMAL
PEF-PRE: NORMAL
RAW %PRED: NORMAL
RAW PRED: NORMAL
RAW: NORMAL
RV PRE %PRED: NORMAL
RV PRED: NORMAL
RV: NORMAL
SVC %PRED: NORMAL
SVC PRED: NORMAL
SVC: NORMAL
TLC PRE %PRED: 90 %
TLC PRED: NORMAL
TLC: NORMAL
VA %PRED: NORMAL
VA PRED: NORMAL
VA: NORMAL
VTG %PRED: NORMAL
VTG PRED: NORMAL
VTG: NORMAL

## 2024-10-10 PROCEDURE — 94729 DIFFUSING CAPACITY: CPT

## 2024-10-10 PROCEDURE — 94010 BREATHING CAPACITY TEST: CPT

## 2024-10-10 PROCEDURE — 94726 PLETHYSMOGRAPHY LUNG VOLUMES: CPT

## 2024-10-10 ASSESSMENT — PULMONARY FUNCTION TESTS
FEV1/FVC_PRE: 78
FEV1_PERCENT_PREDICTED_PRE: 94
FVC_PERCENT_PREDICTED_PRE: 92
FVC_PRE: 2.03

## 2024-10-12 NOTE — PROCEDURES
Spirometry was acceptable and reproducible by ATS standards    Spirometry  Spirometry is normal.    Bronchodilator  There is no response to bronchodilator demonstrated.   [Increase in FEV1 and/or FVC => 12% of control and => 200 ml]    Lung Volumes  Lung volumes are normal.    Diffusing Capacity  Diffusing capacity is normal.      Overall Interpretation  Normal pulmonary function test with an FEV1 of 94%.  Unchanged compared to prior pulmonary function test June 2023.    Pulmonary Function Testing Results:    FEV1 %Pred-Pre   Date Value Ref Range Status   10/10/2024 94 % Final     FEV1 %Pred-Post   Date Value Ref Range Status   06/01/2023   Final     Comment:     pt refused bronchodilator     FEV1/FVC-Pre   Date Value Ref Range Status   10/10/2024 78 % Final     FEV1/FVC-Post   Date Value Ref Range Status   05/16/2022 see comment % Final     TLC Pre %Pred   Date Value Ref Range Status   10/10/2024 90 % Final     DLCO %Pred   Date Value Ref Range Status   10/10/2024 127 % Final     DLCO/VA %Pred   Date Value Ref Range Status   05/16/2022 116 % Final              Obstruction severity and Restriction Severity using FEV1 %  Gold Stage Severity per ERS/ATS FEV1 % per ERS/ATS   GOLD I:  FEV1>80% Mild COPD >70   GOLD II:  FEV1 50-79% Moderate 50-70   GOLD III:  FEV1 30-49% Severe 35-50   GOLD IV:  FEV1 <30 Very Severe  <35       DCLO:  Normal:  %  Mild:  65-70%  Moderate:  41-60%  Severe:  <40%      PFT data will be scanned into the procedure tab in epic under this encounter    Shane López MD  [facility]

## 2024-10-22 ENCOUNTER — HOSPITAL ENCOUNTER (EMERGENCY)
Age: 84
Discharge: HOME | End: 2024-10-22
Attending: EMERGENCY MEDICINE
Payer: MEDICARE

## 2024-10-22 ENCOUNTER — APPOINTMENT (OUTPATIENT)
Dept: CT IMAGING | Age: 84
End: 2024-10-22
Payer: MEDICARE

## 2024-10-22 VITALS
HEART RATE: 59 BPM | SYSTOLIC BLOOD PRESSURE: 172 MMHG | HEIGHT: 62 IN | DIASTOLIC BLOOD PRESSURE: 51 MMHG | RESPIRATION RATE: 16 BRPM | BODY MASS INDEX: 28.93 KG/M2 | TEMPERATURE: 97.3 F | WEIGHT: 157.19 LBS | OXYGEN SATURATION: 98 %

## 2024-10-22 DIAGNOSIS — R10.9 ACUTE LEFT FLANK PAIN: Primary | ICD-10-CM

## 2024-10-22 DIAGNOSIS — K59.00 CONSTIPATION, UNSPECIFIED CONSTIPATION TYPE: ICD-10-CM

## 2024-10-22 LAB
ALBUMIN SERPL-MCNC: 4.2 G/DL (ref 3.4–5)
ALBUMIN/GLOB SERPL: 1.8 {RATIO} (ref 1.1–2.2)
ALP SERPL-CCNC: 77 U/L (ref 40–129)
ALT SERPL-CCNC: 11 U/L (ref 10–40)
ANION GAP SERPL CALCULATED.3IONS-SCNC: 11 MMOL/L (ref 3–16)
AST SERPL-CCNC: 14 U/L (ref 15–37)
BACTERIA URNS QL MICRO: ABNORMAL /HPF
BASOPHILS # BLD: 0 K/UL (ref 0–0.2)
BASOPHILS NFR BLD: 0.3 %
BILIRUB SERPL-MCNC: <0.2 MG/DL (ref 0–1)
BILIRUB UR QL STRIP.AUTO: NEGATIVE
BUN SERPL-MCNC: 42 MG/DL (ref 7–20)
CALCIUM SERPL-MCNC: 9.4 MG/DL (ref 8.3–10.6)
CHLORIDE SERPL-SCNC: 101 MMOL/L (ref 99–110)
CLARITY UR: CLEAR
CO2 SERPL-SCNC: 27 MMOL/L (ref 21–32)
COLOR UR: YELLOW
CREAT SERPL-MCNC: 1.1 MG/DL (ref 0.6–1.2)
DEPRECATED RDW RBC AUTO: 13.7 % (ref 12.4–15.4)
EOSINOPHIL # BLD: 0.2 K/UL (ref 0–0.6)
EOSINOPHIL NFR BLD: 3.4 %
EPI CELLS #/AREA URNS HPF: ABNORMAL /HPF (ref 0–5)
GFR SERPLBLD CREATININE-BSD FMLA CKD-EPI: 50 ML/MIN/{1.73_M2}
GLUCOSE SERPL-MCNC: 107 MG/DL (ref 70–99)
GLUCOSE UR STRIP.AUTO-MCNC: NEGATIVE MG/DL
HCT VFR BLD AUTO: 37.4 % (ref 36–48)
HGB BLD-MCNC: 12 G/DL (ref 12–16)
HGB UR QL STRIP.AUTO: NEGATIVE
IMM GRANULOCYTES # BLD: 0 K/UL (ref 0–0.2)
IMM GRANULOCYTES NFR BLD: 0.1 %
KETONES UR STRIP.AUTO-MCNC: NEGATIVE MG/DL
LEUKOCYTE ESTERASE UR QL STRIP.AUTO: ABNORMAL
LYMPHOCYTES # BLD: 2 K/UL (ref 1–5.1)
LYMPHOCYTES NFR BLD: 27.4 %
MCH RBC QN AUTO: 30.3 PG (ref 26–34)
MCHC RBC AUTO-ENTMCNC: 32.1 G/DL (ref 32–36.4)
MCV RBC AUTO: 94.4 FL (ref 80–100)
MONOCYTES # BLD: 0.5 K/UL (ref 0–1.3)
MONOCYTES NFR BLD: 7.3 %
NEUTROPHILS # BLD: 4.4 K/UL (ref 1.7–7.7)
NEUTROPHILS NFR BLD: 61.5 %
NITRITE UR QL STRIP.AUTO: NEGATIVE
PH UR STRIP.AUTO: 6.5 [PH] (ref 5–8)
PLATELET # BLD AUTO: 184 K/UL (ref 135–450)
PMV BLD AUTO: 12.1 FL (ref 5–10.5)
POTASSIUM SERPL-SCNC: 4.7 MMOL/L (ref 3.5–5.1)
PROT SERPL-MCNC: 6.6 G/DL (ref 6.4–8.2)
PROT UR STRIP.AUTO-MCNC: NEGATIVE MG/DL
RBC # BLD AUTO: 3.96 M/UL (ref 4–5.2)
RBC #/AREA URNS HPF: ABNORMAL /HPF (ref 0–4)
SODIUM SERPL-SCNC: 139 MMOL/L (ref 136–145)
SP GR UR STRIP.AUTO: 1.01 (ref 1–1.03)
UA COMPLETE W REFLEX CULTURE PNL UR: ABNORMAL
UA DIPSTICK W REFLEX MICRO PNL UR: YES
URN SPEC COLLECT METH UR: ABNORMAL
UROBILINOGEN UR STRIP-ACNC: 0.2 E.U./DL
WBC # BLD AUTO: 7.1 K/UL (ref 4–11)
WBC #/AREA URNS HPF: ABNORMAL /HPF (ref 0–5)

## 2024-10-22 PROCEDURE — 81001 URINALYSIS AUTO W/SCOPE: CPT

## 2024-10-22 PROCEDURE — 80053 COMPREHEN METABOLIC PANEL: CPT

## 2024-10-22 PROCEDURE — 99285 EMERGENCY DEPT VISIT HI MDM: CPT

## 2024-10-22 PROCEDURE — 74177 CT ABD & PELVIS W/CONTRAST: CPT

## 2024-10-22 PROCEDURE — 85025 COMPLETE CBC W/AUTO DIFF WBC: CPT

## 2024-10-22 PROCEDURE — 6360000004 HC RX CONTRAST MEDICATION: Performed by: EMERGENCY MEDICINE

## 2024-10-22 PROCEDURE — 36415 COLL VENOUS BLD VENIPUNCTURE: CPT

## 2024-10-22 RX ORDER — AMLODIPINE BESYLATE 2.5 MG/1
2.5 TABLET ORAL
Qty: 90 TABLET | Refills: 3 | Status: SHIPPED | OUTPATIENT
Start: 2024-10-22

## 2024-10-22 RX ADMIN — IOMEPROL INJECTION 100 ML: 714 INJECTION, SOLUTION INTRAVASCULAR at 10:26

## 2024-10-22 ASSESSMENT — PAIN DESCRIPTION - ORIENTATION
ORIENTATION: LEFT

## 2024-10-22 ASSESSMENT — PAIN DESCRIPTION - LOCATION
LOCATION: BACK

## 2024-10-22 ASSESSMENT — PAIN - FUNCTIONAL ASSESSMENT
PAIN_FUNCTIONAL_ASSESSMENT: 0-10
PAIN_FUNCTIONAL_ASSESSMENT: 0-10

## 2024-10-22 ASSESSMENT — PAIN DESCRIPTION - FREQUENCY
FREQUENCY: CONTINUOUS
FREQUENCY: CONTINUOUS

## 2024-10-22 ASSESSMENT — PAIN DESCRIPTION - PAIN TYPE
TYPE: ACUTE PAIN

## 2024-10-22 ASSESSMENT — PAIN DESCRIPTION - DESCRIPTORS
DESCRIPTORS: ACHING

## 2024-10-22 ASSESSMENT — PAIN SCALES - GENERAL
PAINLEVEL_OUTOF10: 7
PAINLEVEL_OUTOF10: 5
PAINLEVEL_OUTOF10: 3

## 2024-10-22 NOTE — ED PROVIDER NOTES
benign.    Patient discharged in good condition      I am the Primary Clinician of Record.      PROCEDURES:  None    FINAL IMPRESSION      1. Acute left flank pain    2. Constipation, unspecified constipation type          DISPOSITION/PLAN   DISPOSITION Decision To Discharge 10/22/2024 11:19:01 AM  Condition at Disposition: Data Unavailable      PATIENT REFERRED TO:  Cynthia Paris MD  [address] [facility] [address] [phone]    Schedule an appointment as soon as possible for a visit in 1 week        DISCHARGE MEDICATIONS:  New Prescriptions    No medications on file       (Please note that portions of this note were completed with a voice recognition program.  Efforts were made to edit the dictations but occasionally words are mis-transcribed.)    JAY GARCIA MD  Attending Emergency Physician        Jay Garcia MD  10/22/24 112

## 2024-10-22 NOTE — ED NOTES
Discharge and education instructions reviewed with patient. Teach-back successful.  Pt verbalized understanding. Pt denied questions at this time. No acute distress noted. Patient instructed to follow-up as noted - return to emergency department if symptoms worsen. Patient verbalized understanding. Discharged per EDMD with discharge instructions. Pt discharged to private vehicle. Patient stable upon departure. Thanked patient for choosing [facility].

## 2024-10-22 NOTE — TELEPHONE ENCOUNTER
Last OV: 9/9/24  Next OV: 5/12/25  Last refill: 1/5/24 #90 3 R/F  Most recent Labs: 8/31/24  Last EKG (if needed):9/9/24    Patient currently admitted

## 2024-10-22 NOTE — DISCHARGE INSTRUCTIONS
You should take your Colace 2 times daily until you have a regular daily bowel movement.  You can then go to 1 time daily.  The Colace dose is 100 mg.    You can take Tylenol every 6 hours as needed for pain.    You should follow-up with your primary care physician in 5 to 7 days if not improved.  You can return at any time for worsening of symptoms or new symptoms of concern.

## 2024-11-03 DIAGNOSIS — I63.30 CEREBROVASCULAR ACCIDENT (CVA) DUE TO THROMBOSIS OF CEREBRAL ARTERY (HCC): ICD-10-CM

## 2024-11-03 DIAGNOSIS — E78.2 MIXED HYPERLIPIDEMIA: ICD-10-CM

## 2024-11-04 RX ORDER — ATORVASTATIN CALCIUM 20 MG/1
20 TABLET, FILM COATED ORAL DAILY
Qty: 90 TABLET | Refills: 3 | Status: SHIPPED | OUTPATIENT
Start: 2024-11-04

## 2024-11-04 NOTE — TELEPHONE ENCOUNTER
Last OV: 9/9/24  Next OV: 5/12/25  Last refill: 11/13/23 #90 3 R/F  Most recent Labs: 10/22/24  Last EKG (if needed): 9/9/24

## 2024-11-11 RX ORDER — FUROSEMIDE 40 MG/1
TABLET ORAL
Qty: 90 TABLET | Refills: 0 | Status: SHIPPED | OUTPATIENT
Start: 2024-11-11 | End: 2024-11-11

## 2024-11-11 RX ORDER — FUROSEMIDE 40 MG/1
TABLET ORAL
Qty: 180 TABLET | Refills: 0 | Status: SHIPPED | OUTPATIENT
Start: 2024-11-11

## 2024-11-11 NOTE — TELEPHONE ENCOUNTER
Patient due for a follow up appointment. Patient sent a message via [employer] to schedule an appointment.

## 2024-12-12 RX ORDER — FUROSEMIDE 40 MG/1
TABLET ORAL
Qty: 180 TABLET | Refills: 0 | Status: SHIPPED | OUTPATIENT
Start: 2024-12-12

## 2024-12-13 RX ORDER — METOPROLOL TARTRATE 25 MG/1
TABLET, FILM COATED ORAL
Qty: 180 TABLET | OUTPATIENT
Start: 2024-12-13

## 2024-12-13 NOTE — TELEPHONE ENCOUNTER
Pt advised and verbalized understanding.    Pt states she will check her work calendar and call back.    Informed pt that Dr. Paris is booking into late January/early February for Medicare AWV.

## 2024-12-16 RX ORDER — POTASSIUM CHLORIDE 1500 MG/1
20 TABLET, EXTENDED RELEASE ORAL DAILY
Qty: 90 TABLET | Refills: 3 | Status: SHIPPED | OUTPATIENT
Start: 2024-12-16

## 2024-12-16 NOTE — TELEPHONE ENCOUNTER
Last OV: 24 - Douglas  Next OV: 25 - Douglas (Otoniel)  Last Labs: 10/22/24  Last EK/24   Last Filled:    Disp Refills Start End    potassium chloride (KLOR-CON M) 20 MEQ extended release tablet 90 tablet 3 2023 --    Sig - Route: TAKE 1 TABLET BY MOUTH EVERY DAY - Oral    Sent to pharmacy as: Potassium Chloride Madisyn ER 20 MEQ Oral Tablet Extended Release (KLOR-CON M)    Notes to Pharmacy: ZERO refills remain on this prescription. Your patient is requesting advance approval of refills for this medication to PREVENT ANY MISSED DOSES    E-Prescribing Status: Receipt confirmed by pharmacy (2023 10:22 AM EST)

## 2024-12-17 ENCOUNTER — TELEPHONE (OUTPATIENT)
Dept: FAMILY MEDICINE CLINIC | Age: 84
End: 2024-12-17

## 2024-12-17 NOTE — TELEPHONE ENCOUNTER
How many days have you had your symptoms: 5 days ago    Do you have a fever: no  If yes what is your most recent temperature:    Do you have shortness of breath: no  Do you have a cough: yes  Is you cough wet or dry: dry  If it is a productive cough what color is your phlegm:no    Do you have a runny nose: yes  If so what color is your discharge: no  Is your mucous thick or thin:no  Are you sneezing:no    Do you have headache/facial pain:yes  Are you having any body aches or chills:no  Are you experiencing nausea: no  Are you vomiting or having diarrhea:no    Have you tested yourself for Covid or the Flu recently: no  If yes, did your results come back positive for either:    What over the counter/medications have you tried: nothing  What Pharmacy do you want any medications sent to: [facility]  (please make sure the Pharmacy is checked for the correct one before routing)

## 2024-12-18 ENCOUNTER — HOSPITAL ENCOUNTER (EMERGENCY)
Age: 84
Discharge: HOME | End: 2024-12-18
Attending: EMERGENCY MEDICINE
Payer: MEDICARE

## 2024-12-18 ENCOUNTER — APPOINTMENT (OUTPATIENT)
Dept: GENERAL RADIOLOGY | Age: 84
End: 2024-12-18
Payer: MEDICARE

## 2024-12-18 VITALS
OXYGEN SATURATION: 94 % | HEART RATE: 60 BPM | DIASTOLIC BLOOD PRESSURE: 79 MMHG | BODY MASS INDEX: 28.6 KG/M2 | TEMPERATURE: 98.6 F | RESPIRATION RATE: 18 BRPM | SYSTOLIC BLOOD PRESSURE: 157 MMHG | HEIGHT: 62 IN | WEIGHT: 155.42 LBS

## 2024-12-18 DIAGNOSIS — J40 BRONCHITIS: Primary | ICD-10-CM

## 2024-12-18 LAB
FLUAV RNA UPPER RESP QL NAA+PROBE: NEGATIVE
FLUBV AG NPH QL: NEGATIVE
SARS-COV-2 RDRP RESP QL NAA+PROBE: NOT DETECTED

## 2024-12-18 PROCEDURE — 71046 X-RAY EXAM CHEST 2 VIEWS: CPT

## 2024-12-18 PROCEDURE — 6370000000 HC RX 637 (ALT 250 FOR IP): Performed by: EMERGENCY MEDICINE

## 2024-12-18 PROCEDURE — 87635 SARS-COV-2 COVID-19 AMP PRB: CPT

## 2024-12-18 PROCEDURE — 87804 INFLUENZA ASSAY W/OPTIC: CPT

## 2024-12-18 PROCEDURE — 99284 EMERGENCY DEPT VISIT MOD MDM: CPT

## 2024-12-18 RX ORDER — PREDNISONE 20 MG/1
20 TABLET ORAL DAILY
Qty: 7 TABLET | Refills: 0 | Status: SHIPPED | OUTPATIENT
Start: 2024-12-18 | End: 2024-12-25

## 2024-12-18 RX ORDER — BENZONATATE 200 MG/1
200 CAPSULE ORAL 3 TIMES DAILY PRN
Qty: 21 CAPSULE | Refills: 0 | Status: SHIPPED | OUTPATIENT
Start: 2024-12-18 | End: 2024-12-25

## 2024-12-18 RX ORDER — IPRATROPIUM BROMIDE AND ALBUTEROL SULFATE 2.5; .5 MG/3ML; MG/3ML
1 SOLUTION RESPIRATORY (INHALATION) ONCE
Status: COMPLETED | OUTPATIENT
Start: 2024-12-18 | End: 2024-12-18

## 2024-12-18 RX ORDER — GUAIFENESIN AND DEXTROMETHORPHAN HYDROBROMIDE 600; 30 MG/1; MG/1
1 TABLET, EXTENDED RELEASE ORAL 2 TIMES DAILY
Qty: 14 TABLET | Refills: 0 | Status: SHIPPED | OUTPATIENT
Start: 2024-12-18

## 2024-12-18 RX ADMIN — IPRATROPIUM BROMIDE AND ALBUTEROL SULFATE 1 DOSE: 2.5; .5 SOLUTION RESPIRATORY (INHALATION) at 06:23

## 2024-12-18 ASSESSMENT — PAIN - FUNCTIONAL ASSESSMENT
PAIN_FUNCTIONAL_ASSESSMENT: 0-10
PAIN_FUNCTIONAL_ASSESSMENT: ACTIVITIES ARE NOT PREVENTED

## 2024-12-18 ASSESSMENT — PAIN SCALES - GENERAL: PAINLEVEL_OUTOF10: 5

## 2024-12-18 ASSESSMENT — PAIN DESCRIPTION - PAIN TYPE: TYPE: ACUTE PAIN

## 2024-12-18 ASSESSMENT — LIFESTYLE VARIABLES
HOW OFTEN DO YOU HAVE A DRINK CONTAINING ALCOHOL: NEVER
HOW MANY STANDARD DRINKS CONTAINING ALCOHOL DO YOU HAVE ON A TYPICAL DAY: PATIENT DOES NOT DRINK

## 2024-12-18 ASSESSMENT — PAIN DESCRIPTION - LOCATION: LOCATION: THROAT

## 2024-12-18 NOTE — ED PROVIDER NOTES
ED COURSE/MDM  Acute upper respiratory infection/bronchitis without hypoxia or increased work of breathing with no evidence of pneumonia on x-ray.  COVID and influenza swabs are negative.  Mucinex DM, Tessalon Perles and low-dose short course prednisone for wheezing.  Patient was given a DuoNeb in the emergency department.  Ibuprofen and Tylenol as needed.  Push fluids.  Follow with PCP if symptoms or not improving.    Patient was given scripts for the following medications. I counseled patient how to take these medications.   New Prescriptions    BENZONATATE (TESSALON) 200 MG CAPSULE    Take 1 capsule by mouth 3 times daily as needed for Cough    DEXTROMETHORPHAN-GUAIFENESIN (MUCINEX DM)  MG PER EXTENDED RELEASE TABLET    Take 1 tablet by mouth 2 times daily    PREDNISONE (DELTASONE) 20 MG TABLET    Take 1 tablet by mouth daily for 7 days         CLINICAL IMPRESSION  1. Bronchitis        Blood pressure (!) 157/79, pulse 60, temperature 98.6 °F (37 °C), temperature source Oral, resp. rate 18, height 1.575 m (5' 2\"), weight 70.5 kg (155 lb 6.8 oz), SpO2 94%, not currently breastfeeding.      Follow-up with:  Cynthia Paris MD  [address] [facility] [address] [phone]    In 1 week  As needed, If symptoms worsen         Sy Mancia MD  12/18/24 9750

## 2024-12-29 ENCOUNTER — APPOINTMENT (OUTPATIENT)
Dept: CT IMAGING | Age: 84
DRG: 310 | End: 2024-12-29
Payer: MEDICARE

## 2024-12-29 ENCOUNTER — HOSPITAL ENCOUNTER (INPATIENT)
Age: 84
LOS: 1 days | Discharge: ANOTHER HOSPITAL | DRG: 310 | End: 2024-12-30
Attending: EMERGENCY MEDICINE | Admitting: STUDENT IN AN ORGANIZED HEALTH CARE EDUCATION/TRAINING PROGRAM
Payer: MEDICARE

## 2024-12-29 ENCOUNTER — APPOINTMENT (OUTPATIENT)
Dept: GENERAL RADIOLOGY | Age: 84
DRG: 310 | End: 2024-12-29
Payer: MEDICARE

## 2024-12-29 DIAGNOSIS — R42 LIGHTHEADEDNESS: Primary | ICD-10-CM

## 2024-12-29 DIAGNOSIS — R00.1 SYMPTOMATIC BRADYCARDIA: ICD-10-CM

## 2024-12-29 DIAGNOSIS — R79.89 ELEVATED TROPONIN: ICD-10-CM

## 2024-12-29 LAB
ALBUMIN SERPL-MCNC: 4 G/DL (ref 3.4–5)
ALBUMIN/GLOB SERPL: 1.6 {RATIO} (ref 1.1–2.2)
ALP SERPL-CCNC: 64 U/L (ref 40–129)
ALT SERPL-CCNC: 19 U/L (ref 10–40)
ANION GAP SERPL CALCULATED.3IONS-SCNC: 10 MMOL/L (ref 3–16)
AST SERPL-CCNC: 15 U/L (ref 15–37)
BASOPHILS # BLD: 0 K/UL (ref 0–0.2)
BASOPHILS NFR BLD: 0.2 %
BILIRUB SERPL-MCNC: <0.2 MG/DL (ref 0–1)
BILIRUB UR QL STRIP.AUTO: NEGATIVE
BUN SERPL-MCNC: 39 MG/DL (ref 7–20)
CALCIUM SERPL-MCNC: 9.2 MG/DL (ref 8.3–10.6)
CHLORIDE SERPL-SCNC: 102 MMOL/L (ref 99–110)
CLARITY UR: CLEAR
CO2 SERPL-SCNC: 28 MMOL/L (ref 21–32)
COLOR UR: YELLOW
CREAT SERPL-MCNC: 1.2 MG/DL (ref 0.6–1.2)
DEPRECATED RDW RBC AUTO: 14 % (ref 12.4–15.4)
EOSINOPHIL # BLD: 0.3 K/UL (ref 0–0.6)
EOSINOPHIL NFR BLD: 3 %
EPI CELLS #/AREA URNS HPF: NORMAL /HPF (ref 0–5)
FLUAV RNA UPPER RESP QL NAA+PROBE: NEGATIVE
FLUBV AG NPH QL: NEGATIVE
GFR SERPLBLD CREATININE-BSD FMLA CKD-EPI: 45 ML/MIN/{1.73_M2}
GLUCOSE SERPL-MCNC: 117 MG/DL (ref 70–99)
GLUCOSE UR STRIP.AUTO-MCNC: NEGATIVE MG/DL
HCT VFR BLD AUTO: 37.9 % (ref 36–48)
HGB BLD-MCNC: 11.8 G/DL (ref 12–16)
HGB UR QL STRIP.AUTO: NEGATIVE
IMM GRANULOCYTES # BLD: 0 K/UL (ref 0–0.2)
IMM GRANULOCYTES NFR BLD: 0.4 %
KETONES UR STRIP.AUTO-MCNC: NEGATIVE MG/DL
LEUKOCYTE ESTERASE UR QL STRIP.AUTO: ABNORMAL
LYMPHOCYTES # BLD: 2.6 K/UL (ref 1–5.1)
LYMPHOCYTES NFR BLD: 28.2 %
MCH RBC QN AUTO: 29.6 PG (ref 26–34)
MCHC RBC AUTO-ENTMCNC: 31.1 G/DL (ref 32–36.4)
MCV RBC AUTO: 95 FL (ref 80–100)
MONOCYTES # BLD: 0.8 K/UL (ref 0–1.3)
MONOCYTES NFR BLD: 8.7 %
NEUTROPHILS # BLD: 5.4 K/UL (ref 1.7–7.7)
NEUTROPHILS NFR BLD: 59.5 %
NITRITE UR QL STRIP.AUTO: NEGATIVE
PH UR STRIP.AUTO: 7 [PH] (ref 5–8)
PLATELET # BLD AUTO: 182 K/UL (ref 135–450)
PMV BLD AUTO: 12.2 FL (ref 5–10.5)
POTASSIUM SERPL-SCNC: 4.4 MMOL/L (ref 3.5–5.1)
PROT SERPL-MCNC: 6.5 G/DL (ref 6.4–8.2)
PROT UR STRIP.AUTO-MCNC: NEGATIVE MG/DL
RBC # BLD AUTO: 3.99 M/UL (ref 4–5.2)
RBC #/AREA URNS HPF: NORMAL /HPF (ref 0–4)
SARS-COV-2 RDRP RESP QL NAA+PROBE: NOT DETECTED
SODIUM SERPL-SCNC: 140 MMOL/L (ref 136–145)
SP GR UR STRIP.AUTO: 1.01 (ref 1–1.03)
TROPONIN, HIGH SENSITIVITY: 21 NG/L (ref 0–14)
TROPONIN, HIGH SENSITIVITY: 22 NG/L (ref 0–14)
UA COMPLETE W REFLEX CULTURE PNL UR: ABNORMAL
UA DIPSTICK W REFLEX MICRO PNL UR: YES
URN SPEC COLLECT METH UR: ABNORMAL
UROBILINOGEN UR STRIP-ACNC: 0.2 E.U./DL
WBC # BLD AUTO: 9.1 K/UL (ref 4–11)
WBC #/AREA URNS HPF: NORMAL /HPF (ref 0–5)

## 2024-12-29 PROCEDURE — 85025 COMPLETE CBC W/AUTO DIFF WBC: CPT

## 2024-12-29 PROCEDURE — 96360 HYDRATION IV INFUSION INIT: CPT

## 2024-12-29 PROCEDURE — 6370000000 HC RX 637 (ALT 250 FOR IP): Performed by: EMERGENCY MEDICINE

## 2024-12-29 PROCEDURE — 93005 ELECTROCARDIOGRAM TRACING: CPT | Performed by: STUDENT IN AN ORGANIZED HEALTH CARE EDUCATION/TRAINING PROGRAM

## 2024-12-29 PROCEDURE — 99285 EMERGENCY DEPT VISIT HI MDM: CPT

## 2024-12-29 PROCEDURE — 71045 X-RAY EXAM CHEST 1 VIEW: CPT

## 2024-12-29 PROCEDURE — 93005 ELECTROCARDIOGRAM TRACING: CPT | Performed by: EMERGENCY MEDICINE

## 2024-12-29 PROCEDURE — 2580000003 HC RX 258: Performed by: EMERGENCY MEDICINE

## 2024-12-29 PROCEDURE — 80053 COMPREHEN METABOLIC PANEL: CPT

## 2024-12-29 PROCEDURE — 81001 URINALYSIS AUTO W/SCOPE: CPT

## 2024-12-29 PROCEDURE — 87804 INFLUENZA ASSAY W/OPTIC: CPT

## 2024-12-29 PROCEDURE — 87635 SARS-COV-2 COVID-19 AMP PRB: CPT

## 2024-12-29 PROCEDURE — 36415 COLL VENOUS BLD VENIPUNCTURE: CPT

## 2024-12-29 PROCEDURE — 1200000000 HC SEMI PRIVATE

## 2024-12-29 PROCEDURE — 70450 CT HEAD/BRAIN W/O DYE: CPT

## 2024-12-29 PROCEDURE — 84484 ASSAY OF TROPONIN QUANT: CPT

## 2024-12-29 RX ORDER — 0.9 % SODIUM CHLORIDE 0.9 %
1000 INTRAVENOUS SOLUTION INTRAVENOUS ONCE
Status: COMPLETED | OUTPATIENT
Start: 2024-12-29 | End: 2024-12-29

## 2024-12-29 RX ORDER — ASPIRIN 81 MG/1
162 TABLET, CHEWABLE ORAL ONCE
Status: COMPLETED | OUTPATIENT
Start: 2024-12-29 | End: 2024-12-29

## 2024-12-29 RX ADMIN — SODIUM CHLORIDE 1000 ML: 0.9 INJECTION, SOLUTION INTRAVENOUS at 16:07

## 2024-12-29 RX ADMIN — ASPIRIN 162 MG: 81 TABLET, CHEWABLE ORAL at 18:02

## 2024-12-29 ASSESSMENT — PAIN - FUNCTIONAL ASSESSMENT
PAIN_FUNCTIONAL_ASSESSMENT: 0-10
PAIN_FUNCTIONAL_ASSESSMENT: 0-10

## 2024-12-29 ASSESSMENT — PAIN SCALES - GENERAL
PAINLEVEL_OUTOF10: 0

## 2024-12-29 ASSESSMENT — LIFESTYLE VARIABLES
HOW OFTEN DO YOU HAVE A DRINK CONTAINING ALCOHOL: NEVER
HOW MANY STANDARD DRINKS CONTAINING ALCOHOL DO YOU HAVE ON A TYPICAL DAY: PATIENT DOES NOT DRINK
HOW MANY STANDARD DRINKS CONTAINING ALCOHOL DO YOU HAVE ON A TYPICAL DAY: PATIENT DOES NOT DRINK
HOW OFTEN DO YOU HAVE A DRINK CONTAINING ALCOHOL: NEVER

## 2024-12-29 NOTE — ED PROVIDER NOTES
[facility]  eMERGENCY dEPARTMENT eNCOUnter      Pt Name: Malika Nation  MRN: [id number]  Birthdate 1940  Date of evaluation: 12/29/2024  Provider: Jorge Santiago MD    CHIEF COMPLAINT       Chief Complaint   Patient presents with    Dizziness     Reports she been feeling dizzy like she could pass out. Took her BP at home was 101/50 and HR was 37. During triage her HR 62 and /80. Reports this started yesterday but increased today. Report HX of stroke that affects her right eye in 2004         HISTORY OF PRESENT ILLNESS   (Location/Symptom, Timing/Onset, Context/Setting, Quality, Duration, Modifying Factors, Severity)  Note limiting factors.     History obtained from: the patient    Malika Nation is a 84 y.o. female with past medical history including previous stroke with right-sided partial vision loss, hyperlipidemia, and atrial fibrillation who reports feeling extremely lightheaded starting yesterday but worsening today to the point where she felt like she was going to pass out.  Patient reports she feels like she needs to use all of her concentration simply to stay awake and not pass out.  Denies any room spinning or vertigo.  Denies any unilateral numbness or weakness.  Patient ports standing up does seem to worsen her symptoms.  Denies any unilateral numbness or weakness.      HPI    Nursing Notes were reviewed.    REVIEW OFSYSTEMS    (2-9 systems for level 4, 10 or more for level 5)     Review of Systems   Constitutional:  Positive for fatigue. Negative for appetite change, fever and unexpected weight change.   HENT:  Negative for facial swelling, trouble swallowing and voice change.    Eyes:  Negative for visual disturbance.   Respiratory:  Negative for shortness of breath, wheezing and stridor.    Cardiovascular:  Negative for chest pain and palpitations.   Gastrointestinal:  Negative for abdominal pain, nausea and vomiting.   Genitourinary:  Negative for

## 2024-12-29 NOTE — FLOWSHEET NOTE
12/29/24 1516   Vital Signs   Pulse 62   Respirations 18   /73   MAP (Calculated) 95   MAP (mmHg) 92   Orthostatic B/P and Pulse? Yes   Blood Pressure Lying 139/73   Pulse Lying 62 PER MINUTE   Blood Pressure Sitting 143/64   Pulse Sitting 59 PER MINUTE   Blood Pressure Standing 134/50  (felt very dizzy w/ standing like she could pass out. RN stated at bedside ; informed EDMD)   Pulse Standing 64 PER MINUTE   Oxygen Therapy   SpO2 99 %

## 2024-12-29 NOTE — ED TRIAGE NOTES
Patient arrived to the ED ambulatory from home w/ complaints of dizziness.     Patient/EMS reports states Reports she been feeling dizzy like she could pass out. Took her BP at home was 101/50 and HR was 37. During triage her HR 62 and /80. Reports this started yesterday but increased today. Report HX of stroke that affects her right eye in 2004    Patient A&O x 4, VSS w/ exception of HTN ,    EDMD at bedside assessing pt

## 2024-12-30 VITALS
RESPIRATION RATE: 19 BRPM | DIASTOLIC BLOOD PRESSURE: 67 MMHG | SYSTOLIC BLOOD PRESSURE: 150 MMHG | HEIGHT: 62 IN | HEART RATE: 61 BPM | WEIGHT: 156.75 LBS | OXYGEN SATURATION: 94 % | TEMPERATURE: 97.9 F | BODY MASS INDEX: 28.84 KG/M2

## 2024-12-30 LAB
EKG ATRIAL RATE: 51 BPM
EKG ATRIAL RATE: 64 BPM
EKG DIAGNOSIS: NORMAL
EKG DIAGNOSIS: NORMAL
EKG P AXIS: 45 DEGREES
EKG P-R INTERVAL: 232 MS
EKG Q-T INTERVAL: 442 MS
EKG Q-T INTERVAL: 474 MS
EKG QRS DURATION: 150 MS
EKG QRS DURATION: 154 MS
EKG QTC CALCULATION (BAZETT): 455 MS
EKG QTC CALCULATION (BAZETT): 461 MS
EKG R AXIS: -19 DEGREES
EKG R AXIS: -23 DEGREES
EKG T AXIS: 54 DEGREES
EKG T AXIS: 64 DEGREES
EKG VENTRICULAR RATE: 57 BPM
EKG VENTRICULAR RATE: 64 BPM

## 2024-12-30 PROCEDURE — 93010 ELECTROCARDIOGRAM REPORT: CPT | Performed by: INTERNAL MEDICINE

## 2024-12-30 ASSESSMENT — PAIN SCALES - GENERAL: PAINLEVEL_OUTOF10: 0

## 2024-12-30 NOTE — ED NOTES
No beds available for admit to [facility] or [place]. Supervisor on call made aware Michelle REES @ [facility].

## 2024-12-30 NOTE — ED NOTES
Md Obrien into see pt and discuss situation w admit beds. Pt informed that she can stay at this emergency.

## 2024-12-30 NOTE — ED PROVIDER NOTES
[place] of Care Note:    I assumed care of this patient at 1900 from Dr. Santiago, please see his note for more detail. Briefly, this pt is a 84-year-old female the past medical history of paroxysmal atrial fibrillation anticoagulated on Eliquis with amiodarone for rate/rhythm control.  Presented to the emergency department with a chief complaint of lightheadedness and presyncope.  Noted to be bradycardic in the 40s/50s.  EKG concerning for first-degree block as well as a left bundle branch and what appears to be a junctional escape rhythm with a long compensatory pause.  Initial troponin was mildly elevated.  Patient asymptomatic without any chest pain.  Difficulty finding bed placement.  I eventually spoke to the [facility] hospitalist service who recommended a cardiology admission.  I then spoke to cardiology Mukesh Cobian.  Who is excepted the patient in transfer to a telemetry bed.  Currently awaiting bed placement.  Of note I have held the patient's nighttime Eliquis and amiodarone to which cardiology agrees with.    Patient was subsequently transferred to  in stable condition.      FINAL IMPRESSION      1. Lightheadedness    2. Symptomatic bradycardia    3. Elevated troponin             Gigi Obrien, DO  12/30/24 0524

## 2024-12-30 NOTE — ED NOTES
[facility] bed Unit 6  [id number] ETA for transport [facility] 0130/ pt updated.

## 2024-12-30 NOTE — ED NOTES
Handoff report given to CABRERA Spencer; all questions and concerns answered; receiving Rn acknowledged and had no further questions at this time; receiving RN to assume all care at this time.

## 2024-12-30 NOTE — ED NOTES
Pt updated on admit to [facility]/ awaiting admit bed assignment and eta for transport team.

## 2025-02-02 ENCOUNTER — HOSPITAL ENCOUNTER (EMERGENCY)
Age: 85
Discharge: HOME | End: 2025-02-02
Attending: EMERGENCY MEDICINE
Payer: MEDICARE

## 2025-02-02 ENCOUNTER — APPOINTMENT (OUTPATIENT)
Dept: GENERAL RADIOLOGY | Age: 85
End: 2025-02-02
Payer: MEDICARE

## 2025-02-02 VITALS
HEART RATE: 85 BPM | OXYGEN SATURATION: 100 % | RESPIRATION RATE: 17 BRPM | WEIGHT: 155.65 LBS | TEMPERATURE: 98.2 F | DIASTOLIC BLOOD PRESSURE: 63 MMHG | SYSTOLIC BLOOD PRESSURE: 161 MMHG | HEIGHT: 62 IN | BODY MASS INDEX: 28.64 KG/M2

## 2025-02-02 DIAGNOSIS — R05.1 ACUTE COUGH: Primary | ICD-10-CM

## 2025-02-02 PROCEDURE — 87804 INFLUENZA ASSAY W/OPTIC: CPT

## 2025-02-02 PROCEDURE — 99284 EMERGENCY DEPT VISIT MOD MDM: CPT

## 2025-02-02 PROCEDURE — 87635 SARS-COV-2 COVID-19 AMP PRB: CPT

## 2025-02-02 PROCEDURE — 71046 X-RAY EXAM CHEST 2 VIEWS: CPT

## 2025-02-02 RX ORDER — AZITHROMYCIN 250 MG/1
TABLET, FILM COATED ORAL
Qty: 6 TABLET | Refills: 0 | Status: SHIPPED | OUTPATIENT
Start: 2025-02-02 | End: 2025-02-12

## 2025-02-02 ASSESSMENT — PAIN SCALES - GENERAL
PAINLEVEL_OUTOF10: 0
PAINLEVEL_OUTOF10: 0

## 2025-02-02 ASSESSMENT — PAIN - FUNCTIONAL ASSESSMENT
PAIN_FUNCTIONAL_ASSESSMENT: 0-10
PAIN_FUNCTIONAL_ASSESSMENT: 0-10

## 2025-02-02 NOTE — ED NOTES
Fall risk screening completed.  Fall risk bracelet applied to patient.  Patient wants to keep her tennis shoes on. She refused the nonskid socks.  The fall risk is indicated using  fall sign .  Based on score, a bed alarm was not indicated.  The call light is within the patient's reach, and instructions for use were provided.  The bed is in the lowest position with wheels locked.  The patient has been advised to notify staff, using the call light, if there is a need to get up or use restroom.  The patient verbalized understanding of safety precautions and how to contact staff for assistance.

## 2025-02-02 NOTE — DISCHARGE INSTRUCTIONS
Your COVID and flu test were negative today.  Your chest x-ray did not show any pneumonia.  However given how long your symptoms have been going on and your past medical history we have started you on an antibiotic.  Please take this as directed.

## 2025-02-02 NOTE — ED PROVIDER NOTES
[facility] EMERGENCY DEPARTMENT  EMERGENCY DEPARTMENT ENCOUNTER        Pt Name: Malika Nation  MRN: [id number]  Birthdate 1940  Date of evaluation: 2/2/2025  Provider: Renetta Vazquez MD  PCP: Cynthia Paris MD  Note Started: 12:52 PM EST 2/2/25    CHIEF COMPLAINT       Chief Complaint   Patient presents with    Cough    Ear Pain    Nasal Congestion     C/o cough, runny nose, and ear pain x 1 week        HISTORY OF PRESENT ILLNESS: 1 or more Elements     History from : Patient    Limitations to history : None    Malika Nation is a 84 y.o. female who presents to the emergency department with cough, fatigue, ear pain and nasal congestion.  This has been going on for 1 week.  Patient does not believe that she has had a fever or chills.  No nausea or vomiting.  She has a pacemaker and states she supposed to get it checked this week.    Nursing Notes were all reviewed and agreed with or any disagreements were addressed in the HPI.    REVIEW OF SYSTEMS :      Review of Systems    5 systems reviewed and negative except as in HPI/MDM    SURGICAL HISTORY     Past Surgical History:   Procedure Laterality Date    BREAST SURGERY Left     lumpectomy    CARDIOVERSION  2019    [facility]     COLONOSCOPY N/A 06/18/2019    COLONOSCOPY POLYPECTOMY COLD BIOPSY performed by Omari Garcia MD at [facility] ENDOSCOPY    CYST REMOVAL  09/2007    scalp cyst excision    CYST REMOVAL      from left breast    HERNIA REPAIR Left 02/17/2023    LEFT INGUINAL HERNIA REPAIR WITH MESH performed by Raymundo Grullon MD at [facility] OR    HYSTERECTOMY, TOTAL ABDOMINAL (CERVIX REMOVED)      with bso     OVARIAN CYST REMOVAL Right     Age 21    PACEMAKER PLACEMENT Left     PULMONARY FUNCTION TEST  10/12/2024    UPPER GASTROINTESTINAL ENDOSCOPY N/A 01/16/2019    EGD ESOPHAGOGASTRODUODENOSCOPY performed by Omari Garcia MD at [facility] ENDOSCOPY    UPPER GASTROINTESTINAL ENDOSCOPY N/A 06/18/2019    EGD BIOPSY performed by Omari Garcia MD

## 2025-02-02 NOTE — ED NOTES
Discharge and education instructions reviewed with patient. Teach-back successful.  Pt verbalized understanding. Pt denied questions at this time. No acute distress noted. Patient instructed to follow-up as noted - return to emergency department if symptoms worsen. Patient verbalized understanding. Discharged per EDMD with discharge instructions. Pt discharged to private vehicle. Patient stable upon departure. Thanked patient for choosing [facility] care.

## 2025-03-12 ENCOUNTER — APPOINTMENT (OUTPATIENT)
Dept: GENERAL RADIOLOGY | Age: 85
End: 2025-03-12
Payer: MEDICARE

## 2025-03-12 ENCOUNTER — HOSPITAL ENCOUNTER (EMERGENCY)
Age: 85
Discharge: HOME | End: 2025-03-12
Attending: EMERGENCY MEDICINE
Payer: MEDICARE

## 2025-03-12 ENCOUNTER — APPOINTMENT (OUTPATIENT)
Dept: CT IMAGING | Age: 85
End: 2025-03-12
Payer: MEDICARE

## 2025-03-12 VITALS
WEIGHT: 153.22 LBS | OXYGEN SATURATION: 98 % | SYSTOLIC BLOOD PRESSURE: 148 MMHG | HEART RATE: 94 BPM | HEIGHT: 62 IN | BODY MASS INDEX: 28.2 KG/M2 | RESPIRATION RATE: 20 BRPM | TEMPERATURE: 98.7 F | DIASTOLIC BLOOD PRESSURE: 94 MMHG

## 2025-03-12 DIAGNOSIS — R11.2 NAUSEA AND VOMITING, UNSPECIFIED VOMITING TYPE: ICD-10-CM

## 2025-03-12 DIAGNOSIS — R10.9 ACUTE ABDOMINAL PAIN: Primary | ICD-10-CM

## 2025-03-12 LAB
ALBUMIN SERPL-MCNC: 4.4 G/DL (ref 3.4–5)
ALBUMIN/GLOB SERPL: 1.7 {RATIO} (ref 1.1–2.2)
ALP SERPL-CCNC: 62 U/L (ref 40–129)
ALT SERPL-CCNC: 19 U/L (ref 10–40)
ANION GAP SERPL CALCULATED.3IONS-SCNC: 16 MMOL/L (ref 3–16)
AST SERPL-CCNC: 24 U/L (ref 15–37)
BASOPHILS # BLD: 0 K/UL (ref 0–0.2)
BASOPHILS NFR BLD: 0.3 %
BILIRUB SERPL-MCNC: 0.3 MG/DL (ref 0–1)
BILIRUB UR QL STRIP.AUTO: NEGATIVE
BUN SERPL-MCNC: 31 MG/DL (ref 7–20)
CALCIUM SERPL-MCNC: 9.4 MG/DL (ref 8.3–10.6)
CHLORIDE SERPL-SCNC: 97 MMOL/L (ref 99–110)
CLARITY UR: CLEAR
CO2 SERPL-SCNC: 26 MMOL/L (ref 21–32)
COLOR UR: YELLOW
CREAT SERPL-MCNC: 1.1 MG/DL (ref 0.6–1.2)
DEPRECATED RDW RBC AUTO: 13.6 % (ref 12.4–15.4)
EKG ATRIAL RATE: 59 BPM
EKG DIAGNOSIS: NORMAL
EKG P AXIS: 82 DEGREES
EKG P-R INTERVAL: 200 MS
EKG Q-T INTERVAL: 398 MS
EKG QRS DURATION: 138 MS
EKG QTC CALCULATION (BAZETT): 489 MS
EKG R AXIS: -48 DEGREES
EKG T AXIS: 88 DEGREES
EKG VENTRICULAR RATE: 91 BPM
EOSINOPHIL # BLD: 0.1 K/UL (ref 0–0.6)
EOSINOPHIL NFR BLD: 1 %
FLUAV RNA UPPER RESP QL NAA+PROBE: NEGATIVE
FLUBV AG NPH QL: NEGATIVE
GFR SERPLBLD CREATININE-BSD FMLA CKD-EPI: 49 ML/MIN/{1.73_M2}
GLUCOSE SERPL-MCNC: 164 MG/DL (ref 70–99)
GLUCOSE UR STRIP.AUTO-MCNC: NEGATIVE MG/DL
HCT VFR BLD AUTO: 41.2 % (ref 36–48)
HGB BLD-MCNC: 12.9 G/DL (ref 12–16)
HGB UR QL STRIP.AUTO: NEGATIVE
IMM GRANULOCYTES # BLD: 0 K/UL (ref 0–0.2)
IMM GRANULOCYTES NFR BLD: 0.1 %
KETONES UR STRIP.AUTO-MCNC: NEGATIVE MG/DL
LEUKOCYTE ESTERASE UR QL STRIP.AUTO: NEGATIVE
LIPASE SERPL-CCNC: 30 U/L (ref 13–60)
LYMPHOCYTES # BLD: 1.5 K/UL (ref 1–5.1)
LYMPHOCYTES NFR BLD: 16.4 %
MCH RBC QN AUTO: 29.4 PG (ref 26–34)
MCHC RBC AUTO-ENTMCNC: 31.3 G/DL (ref 32–36.4)
MCV RBC AUTO: 93.8 FL (ref 80–100)
MONOCYTES # BLD: 0.3 K/UL (ref 0–1.3)
MONOCYTES NFR BLD: 3.5 %
NEUTROPHILS # BLD: 7.3 K/UL (ref 1.7–7.7)
NEUTROPHILS NFR BLD: 78.7 %
NITRITE UR QL STRIP.AUTO: NEGATIVE
NT-PROBNP SERPL-MCNC: 1198 PG/ML (ref 0–449)
PH UR STRIP.AUTO: 7 [PH] (ref 5–8)
PLATELET # BLD AUTO: 187 K/UL (ref 135–450)
PMV BLD AUTO: 12 FL (ref 5–10.5)
POTASSIUM SERPL-SCNC: 3.9 MMOL/L (ref 3.5–5.1)
PROT SERPL-MCNC: 7 G/DL (ref 6.4–8.2)
PROT UR STRIP.AUTO-MCNC: NEGATIVE MG/DL
RBC # BLD AUTO: 4.39 M/UL (ref 4–5.2)
SARS-COV-2 RDRP RESP QL NAA+PROBE: NOT DETECTED
SODIUM SERPL-SCNC: 139 MMOL/L (ref 136–145)
SP GR UR STRIP.AUTO: 1.01 (ref 1–1.03)
TROPONIN, HIGH SENSITIVITY: 19 NG/L (ref 0–14)
TROPONIN, HIGH SENSITIVITY: 20 NG/L (ref 0–14)
UA COMPLETE W REFLEX CULTURE PNL UR: NORMAL
UA DIPSTICK W REFLEX MICRO PNL UR: NORMAL
URN SPEC COLLECT METH UR: NORMAL
UROBILINOGEN UR STRIP-ACNC: 0.2 E.U./DL
WBC # BLD AUTO: 9.2 K/UL (ref 4–11)

## 2025-03-12 PROCEDURE — 93005 ELECTROCARDIOGRAM TRACING: CPT | Performed by: EMERGENCY MEDICINE

## 2025-03-12 PROCEDURE — 74177 CT ABD & PELVIS W/CONTRAST: CPT

## 2025-03-12 PROCEDURE — 83880 ASSAY OF NATRIURETIC PEPTIDE: CPT

## 2025-03-12 PROCEDURE — 36415 COLL VENOUS BLD VENIPUNCTURE: CPT

## 2025-03-12 PROCEDURE — 83690 ASSAY OF LIPASE: CPT

## 2025-03-12 PROCEDURE — 6360000002 HC RX W HCPCS: Performed by: EMERGENCY MEDICINE

## 2025-03-12 PROCEDURE — 71046 X-RAY EXAM CHEST 2 VIEWS: CPT

## 2025-03-12 PROCEDURE — 80053 COMPREHEN METABOLIC PANEL: CPT

## 2025-03-12 PROCEDURE — 85025 COMPLETE CBC W/AUTO DIFF WBC: CPT

## 2025-03-12 PROCEDURE — 99285 EMERGENCY DEPT VISIT HI MDM: CPT

## 2025-03-12 PROCEDURE — 81003 URINALYSIS AUTO W/O SCOPE: CPT

## 2025-03-12 PROCEDURE — 87804 INFLUENZA ASSAY W/OPTIC: CPT

## 2025-03-12 PROCEDURE — 6360000004 HC RX CONTRAST MEDICATION: Performed by: EMERGENCY MEDICINE

## 2025-03-12 PROCEDURE — 93010 ELECTROCARDIOGRAM REPORT: CPT | Performed by: INTERNAL MEDICINE

## 2025-03-12 PROCEDURE — 87635 SARS-COV-2 COVID-19 AMP PRB: CPT

## 2025-03-12 PROCEDURE — 84484 ASSAY OF TROPONIN QUANT: CPT

## 2025-03-12 PROCEDURE — 96375 TX/PRO/DX INJ NEW DRUG ADDON: CPT

## 2025-03-12 PROCEDURE — 96374 THER/PROPH/DIAG INJ IV PUSH: CPT

## 2025-03-12 RX ORDER — IOPAMIDOL 755 MG/ML
100 INJECTION, SOLUTION INTRAVASCULAR
Status: COMPLETED | OUTPATIENT
Start: 2025-03-12 | End: 2025-03-12

## 2025-03-12 RX ORDER — ONDANSETRON 4 MG/1
4 TABLET, ORALLY DISINTEGRATING ORAL 3 TIMES DAILY PRN
Qty: 21 TABLET | Refills: 0 | Status: SHIPPED | OUTPATIENT
Start: 2025-03-12

## 2025-03-12 RX ORDER — FENTANYL CITRATE 50 UG/ML
25 INJECTION, SOLUTION INTRAMUSCULAR; INTRAVENOUS ONCE
Refills: 0 | Status: COMPLETED | OUTPATIENT
Start: 2025-03-12 | End: 2025-03-12

## 2025-03-12 RX ORDER — ONDANSETRON 2 MG/ML
4 INJECTION INTRAMUSCULAR; INTRAVENOUS ONCE
Status: COMPLETED | OUTPATIENT
Start: 2025-03-12 | End: 2025-03-12

## 2025-03-12 RX ADMIN — ONDANSETRON 4 MG: 2 INJECTION, SOLUTION INTRAMUSCULAR; INTRAVENOUS at 09:04

## 2025-03-12 RX ADMIN — FENTANYL CITRATE 25 MCG: 50 INJECTION INTRAMUSCULAR; INTRAVENOUS at 09:31

## 2025-03-12 RX ADMIN — IOPAMIDOL 100 ML: 755 INJECTION, SOLUTION INTRAVENOUS at 10:03

## 2025-03-12 ASSESSMENT — PAIN - FUNCTIONAL ASSESSMENT
PAIN_FUNCTIONAL_ASSESSMENT: 0-10
PAIN_FUNCTIONAL_ASSESSMENT: PREVENTS OR INTERFERES SOME ACTIVE ACTIVITIES AND ADLS

## 2025-03-12 ASSESSMENT — PAIN DESCRIPTION - LOCATION
LOCATION: ABDOMEN

## 2025-03-12 ASSESSMENT — PAIN DESCRIPTION - ONSET: ONSET: SUDDEN

## 2025-03-12 ASSESSMENT — PAIN SCALES - GENERAL
PAINLEVEL_OUTOF10: 10
PAINLEVEL_OUTOF10: 10
PAINLEVEL_OUTOF10: 1
PAINLEVEL_OUTOF10: 1

## 2025-03-12 ASSESSMENT — PAIN DESCRIPTION - ORIENTATION
ORIENTATION: MID

## 2025-03-12 ASSESSMENT — PAIN DESCRIPTION - DESCRIPTORS
DESCRIPTORS: DISCOMFORT
DESCRIPTORS: ACHING;STABBING;SQUEEZING
DESCRIPTORS: DULL
DESCRIPTORS: DISCOMFORT

## 2025-03-12 ASSESSMENT — PAIN DESCRIPTION - PAIN TYPE: TYPE: ACUTE PAIN

## 2025-03-12 ASSESSMENT — PAIN DESCRIPTION - FREQUENCY: FREQUENCY: CONTINUOUS

## 2025-03-12 NOTE — ED PROVIDER NOTES
intervention.      CONSULTS:  None    PROCEDURES:  Unless otherwise noted below, none     Procedures        FINAL IMPRESSION      1. Acute abdominal pain    2. Nausea and vomiting, unspecified vomiting type          DISPOSITION/PLAN   DISPOSITION Decision To Discharge 03/12/2025 11:18:35 AM      PATIENT REFERRED TO:  Cynthia Paris MD  [address] [facility] [address] [phone]    Call today        DISCHARGE MEDICATIONS:  New Prescriptions    ONDANSETRON (ZOFRAN-ODT) 4 MG DISINTEGRATING TABLET    Take 1 tablet by mouth 3 times daily as needed for Nausea or Vomiting     Controlled Substances Monitoring:     RX Monitoring Periodic Controlled Substance Monitoring   1/15/2023  11:52 AM Possible medication side effects, risk of tolerance/dependence & alternative treatments discussed.;No signs of potential drug abuse or diversion identified.       (Please note that portions of this note were completed with a voice recognition program.  Efforts were made to edit the dictations but occasionally words are mis-transcribed.)    ISAIAS ESTRADA DO (electronically signed)  Attending Emergency Physician            Isaias Estrada DO  03/12/25 9933

## 2025-03-12 NOTE — DISCHARGE INSTRUCTIONS
Drink plenty of fluids.  Follow-up with a primary care physician in 1 to 2 days for reexamination.  Follow a clear liquid diet for 24 hours.  Advance to a bland diet as tolerated.  If condition worsens or new symptoms develop, return immediately to the emergency department.

## 2025-03-12 NOTE — ED NOTES
Pt awaiting call back to see who can come get her. Pt's d/c paperwork and plan of care reviewed w/ her. Pt acknowledged d/c AVS and had no questions for this RN at this time. Pt will let RN know once ride is secured. Rn offered a Lyft home but instructed pt has to pay for lyft d/t hospital policy. Pt refused at this time.

## 2025-03-12 NOTE — ED TRIAGE NOTES
Patient arrived to the ED ambulatory from home w/ complaints of abdominal pain.     Patient/EMS reports states Mid abdominal pain w/ dry heavies but no emesis onset around 2300 but increased around 0300 when she woke up. Denies sick contact, fever, chills,     Patient A&O x 4, VSS w/ exception of elevated BP (reports took all meds prior to arrival) ,

## 2025-03-12 NOTE — ED NOTES
Pt boss just arrived to bedside and pt and boss ambulated out to his car to take pt home. Pt leaving her car on site till tomorrow 3/13/2025 when her daughter can come get it. RN acknowledged. No further questions or needs expressed

## 2025-03-12 NOTE — ED NOTES
D/C: Order noted for d/c. Pt confirmed d/c paperwork has correct name. Discharge and education instructions reviewed with patient. Teach-back successful.  Pt verbalized understanding and denied questions at this time. No acute distress noted. Patient instructed to follow-up as noted - return to emergency department if symptoms worsen. Patient verbalized understanding. Discharged per EDMD with discharge instructions. Pt discharged to private vehicle. Patient stable upon departure. Thanked patient for [facility] for care. Provider aware of patient pain at time of discharge.

## 2025-05-21 ENCOUNTER — HOSPITAL ENCOUNTER (EMERGENCY)
Age: 85
Discharge: HOME | End: 2025-05-21
Attending: STUDENT IN AN ORGANIZED HEALTH CARE EDUCATION/TRAINING PROGRAM
Payer: MEDICARE

## 2025-05-21 ENCOUNTER — APPOINTMENT (OUTPATIENT)
Dept: GENERAL RADIOLOGY | Age: 85
End: 2025-05-21
Payer: MEDICARE

## 2025-05-21 VITALS
SYSTOLIC BLOOD PRESSURE: 138 MMHG | BODY MASS INDEX: 27.59 KG/M2 | HEIGHT: 62 IN | TEMPERATURE: 97.9 F | HEART RATE: 69 BPM | RESPIRATION RATE: 18 BRPM | OXYGEN SATURATION: 100 % | WEIGHT: 149.91 LBS | DIASTOLIC BLOOD PRESSURE: 50 MMHG

## 2025-05-21 DIAGNOSIS — B34.9 VIRAL SYNDROME: Primary | ICD-10-CM

## 2025-05-21 DIAGNOSIS — R05.1 ACUTE COUGH: ICD-10-CM

## 2025-05-21 PROCEDURE — 87635 SARS-COV-2 COVID-19 AMP PRB: CPT

## 2025-05-21 PROCEDURE — 87804 INFLUENZA ASSAY W/OPTIC: CPT

## 2025-05-21 PROCEDURE — 71046 X-RAY EXAM CHEST 2 VIEWS: CPT

## 2025-05-21 PROCEDURE — 99284 EMERGENCY DEPT VISIT MOD MDM: CPT

## 2025-05-21 PROCEDURE — 6370000000 HC RX 637 (ALT 250 FOR IP): Performed by: STUDENT IN AN ORGANIZED HEALTH CARE EDUCATION/TRAINING PROGRAM

## 2025-05-21 RX ORDER — CETIRIZINE HYDROCHLORIDE 10 MG/1
5 TABLET ORAL DAILY
Qty: 5 TABLET | Refills: 0 | Status: SHIPPED | OUTPATIENT
Start: 2025-05-21 | End: 2025-05-31

## 2025-05-21 RX ORDER — ACETAMINOPHEN 325 MG/1
650 TABLET ORAL ONCE
Status: COMPLETED | OUTPATIENT
Start: 2025-05-21 | End: 2025-05-21

## 2025-05-21 RX ADMIN — ACETAMINOPHEN 650 MG: 325 TABLET ORAL at 08:35

## 2025-05-21 ASSESSMENT — PAIN SCALES - GENERAL
PAINLEVEL_OUTOF10: 0
PAINLEVEL_OUTOF10: 0

## 2025-05-21 ASSESSMENT — PAIN - FUNCTIONAL ASSESSMENT
PAIN_FUNCTIONAL_ASSESSMENT: NONE - DENIES PAIN
PAIN_FUNCTIONAL_ASSESSMENT: NONE - DENIES PAIN

## 2025-05-21 ASSESSMENT — LIFESTYLE VARIABLES: HOW OFTEN DO YOU HAVE A DRINK CONTAINING ALCOHOL: NEVER

## 2025-05-21 NOTE — ED TRIAGE NOTES
For one week she has been having cough, sinus and chest congestion, scratchy throat.   No chills or body aches.  A lot of her coworkers have similar symptoms.  Has been taking cough lozenges, Tylenol and Vitamin C.  Cough is nonproductive.  Will need a work note for today.  Talkative and pleasant disposition.

## 2025-05-21 NOTE — ED PROVIDER NOTES
[facility] EMERGENCY DEPARTMENT      EMERGENCY MEDICINE     Pt Name: Malika Nation  MRN: [id number]  Birthdate 1940  Date of evaluation: 5/21/2025  Provider: Gigi Obrien DO    CHIEF COMPLAINT       Chief Complaint   Patient presents with    Illness     For one week she has been having cough, sinus and chest congestion, scratchy throat.   No chills or body aches.  A lot of her coworkers have similar symptoms.  Has been taking cough lozenges, Tylenol and Vitamin C.  Cough is nonproductive.  Will need a work note for today.     HISTORY OF PRESENT ILLNESS   Malika Nation is a 84 y.o. female who presents to the emergency department for malaise, congestion, and a nonproductive cough.  Patient states that symptoms have been ongoing for approximately a week now.  Multiple coworkers have similar symptoms.  She has been taking vitamin C, Tylenol with last dose being yesterday as well as cough drops.  States they have been giving her minimal relief.  She has not been running fevers and denies any subjective signs of fever including chills or sweats.  She has a history of atrial fibrillation as well as a pacemaker.  She is anticoagulated with Eliquis and also takes amiodarone.  States she has been compliant with these medications.  Denies any chest pain or palpitations.  States her primary concern is a nonproductive cough as well as her general malaise.  She otherwise has no other complaints at this time.        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

## 2025-05-21 NOTE — DISCHARGE INSTRUCTIONS
You were seen today in the emergency department due to a cough and generally feeling poorly.  Your COVID and flu swabs were both negative and your chest x-ray did not show any evidence of pneumonia, however it is likely that your symptoms are being caused by a virus versus possible seasonal allergies.  Please note a prescription for cetirizine which is also known as Zyrtec has been sent to your pharmacy, please take it as prescribed.  Please call your primary doctor for a follow-up appointment in the next 2 days if your symptoms not improved.  Please return to the emergency department if you experience further concerning symptoms.

## 2025-08-15 DIAGNOSIS — I63.30 CEREBROVASCULAR ACCIDENT (CVA) DUE TO THROMBOSIS OF CEREBRAL ARTERY (HCC): ICD-10-CM

## 2025-08-15 DIAGNOSIS — E78.2 MIXED HYPERLIPIDEMIA: ICD-10-CM

## 2025-08-27 RX ORDER — ATORVASTATIN CALCIUM 20 MG/1
20 TABLET, FILM COATED ORAL DAILY
Qty: 90 TABLET | Refills: 3 | OUTPATIENT
Start: 2025-08-27

## (undated) DEVICE — 3M™ TEGADERM™ TRANSPARENT FILM DRESSING FRAME STYLE, 1626W, 4 IN X 4-3/4 IN (10 CM X 12 CM), 50/CT 4CT/CASE: Brand: 3M™ TEGADERM™

## (undated) DEVICE — SUTURE PDS + SZ 2 0 L27IN ABSRB VLT L26MM CT 2 1 2 CIR PDP333H

## (undated) DEVICE — GLOVE ORANGE PI 7   MSG9070

## (undated) DEVICE — CONTAINER SPEC 480ML CLR POLYSTYR 10% NEUT BUFF FRMLN ZN

## (undated) DEVICE — SUTURE VCRL + SZ 2-0 L27IN ABSRB CLR CT-1 1/2 CIR TAPERCUT VCP259H

## (undated) DEVICE — ENDOSCOPY KIT: Brand: MEDLINE INDUSTRIES, INC.

## (undated) DEVICE — GARMENT COMPR STD FOR 17IN CALF UNIF THER FLOTRN

## (undated) DEVICE — MINOR SET UP: Brand: MEDLINE INDUSTRIES, INC.

## (undated) DEVICE — BITE BLK 60FR GRN ENDOSCP AD W STRP SLD DISPOSABLE

## (undated) DEVICE — MOUTHPIECE ENDOSCP L CTRL OPN AND SIDE PORTS DISP

## (undated) DEVICE — 3M™ STERI-STRIP™ COMPOUND BENZOIN TINCTURE 40 BAGS/CARTON 4 CARTONS/CASE C1544: Brand: 3M™ STERI-STRIP™

## (undated) DEVICE — STRIP,CLOSURE,WOUND,MEDI-STRIP,1/2X4: Brand: MEDLINE

## (undated) DEVICE — MERCY HEALTH WEST TURNOVER: Brand: MEDLINE INDUSTRIES, INC.

## (undated) DEVICE — ENDO CARRY-ON PROCEDURE KIT: Brand: ENDO CARRY-ON PROCEDURE KIT

## (undated) DEVICE — SPONGE GZ W4XL4IN COT 12 PLY TYP VII WVN C FLD DSGN STERILE

## (undated) DEVICE — SUTURE VCRL + SZ 3-0 L27IN ABSRB UD L26MM SH 1/2 CIR VCP416H

## (undated) DEVICE — PENROSE DRAIN 18 X .5" SILICONE: Brand: MEDLINE

## (undated) DEVICE — SUTURE VCRL + SZ 4-0 L18IN ABSRB UD L19MM PS-2 3/8 CIR PRIM VCP496H

## (undated) DEVICE — SOLUTION IV IRRIG POUR BRL 0.9% SODIUM CHL 2F7124

## (undated) DEVICE — CLEANER,CAUTERY TIP,2X2",STERILE: Brand: MEDLINE

## (undated) DEVICE — SINGLE-USE BIOPSY FORCEPS: Brand: RADIAL JAW 4